# Patient Record
Sex: FEMALE | Race: WHITE | NOT HISPANIC OR LATINO
[De-identification: names, ages, dates, MRNs, and addresses within clinical notes are randomized per-mention and may not be internally consistent; named-entity substitution may affect disease eponyms.]

---

## 2018-12-01 ENCOUNTER — TRANSCRIPTION ENCOUNTER (OUTPATIENT)
Age: 63
End: 2018-12-01

## 2018-12-04 ENCOUNTER — APPOINTMENT (OUTPATIENT)
Dept: INTERNAL MEDICINE | Facility: CLINIC | Age: 63
End: 2018-12-04
Payer: COMMERCIAL

## 2018-12-04 VITALS
RESPIRATION RATE: 12 BRPM | WEIGHT: 148 LBS | DIASTOLIC BLOOD PRESSURE: 85 MMHG | OXYGEN SATURATION: 59 % | SYSTOLIC BLOOD PRESSURE: 133 MMHG | TEMPERATURE: 98.5 F | HEART RATE: 96 BPM | BODY MASS INDEX: 26.22 KG/M2 | HEIGHT: 63 IN

## 2018-12-04 DIAGNOSIS — J32.9 CHRONIC SINUSITIS, UNSPECIFIED: ICD-10-CM

## 2018-12-04 PROCEDURE — 99214 OFFICE O/P EST MOD 30 MIN: CPT

## 2019-01-02 NOTE — HISTORY OF PRESENT ILLNESS
[FreeTextEntry8] : 64 yo female, presents stating that she started having a head cold on Thanksgiving and was taking Dayquil. She stopped the Dayquil on Friday. Saturday the sinus pain was treally bad and went to urgent care. Was diagnosed with sinusitis and was prescribed augmentin 875mg po BID with food. Pt says she has been taking then for the past 2 days but she has not seen any improvement\par Says she still has sinus pressure and pain\par When she wakes up in the morning she feels fine. Later on in the day her sinus start hurting again

## 2019-01-02 NOTE — ASSESSMENT
[FreeTextEntry1] : sinusitis\par d/c augmentin\par start Levaquin 500mg po daily\par nasacort AQ\par medrol dose pack

## 2019-01-02 NOTE — PHYSICAL EXAM
[No Acute Distress] : no acute distress [Well Nourished] : well nourished [Well Developed] : well developed [Normal Sclera/Conjunctiva] : normal sclera/conjunctiva [Normal Outer Ear/Nose] : the outer ears and nose were normal in appearance [Normal Oropharynx] : the oropharynx was normal [Normal TMs] : both tympanic membranes were normal [No JVD] : no jugular venous distention [No Respiratory Distress] : no respiratory distress  [Clear to Auscultation] : lungs were clear to auscultation bilaterally [No Accessory Muscle Use] : no accessory muscle use [Normal Rate] : normal rate  [Regular Rhythm] : with a regular rhythm [Normal S1, S2] : normal S1 and S2 [No Murmur] : no murmur heard [No Edema] : there was no peripheral edema [Soft] : abdomen soft [Non Tender] : non-tender [Non-distended] : non-distended [No CVA Tenderness] : no CVA  tenderness [No Joint Swelling] : no joint swelling [No Rash] : no rash [Normal Gait] : normal gait [Speech Grossly Normal] : speech grossly normal [Normal Insight/Judgement] : insight and judgment were intact [de-identified] : + left maxillary sinus and left ethmoid/pressure pain

## 2019-08-29 ENCOUNTER — APPOINTMENT (OUTPATIENT)
Dept: INTERNAL MEDICINE | Facility: CLINIC | Age: 64
End: 2019-08-29
Payer: COMMERCIAL

## 2019-08-29 VITALS
WEIGHT: 143 LBS | OXYGEN SATURATION: 96 % | RESPIRATION RATE: 12 BRPM | TEMPERATURE: 99 F | DIASTOLIC BLOOD PRESSURE: 82 MMHG | HEIGHT: 63 IN | HEART RATE: 71 BPM | SYSTOLIC BLOOD PRESSURE: 144 MMHG | BODY MASS INDEX: 25.34 KG/M2

## 2019-08-29 DIAGNOSIS — A08.4 VIRAL INTESTINAL INFECTION, UNSPECIFIED: ICD-10-CM

## 2019-08-29 DIAGNOSIS — B34.9 VIRAL INFECTION, UNSPECIFIED: ICD-10-CM

## 2019-08-29 LAB
FLUAV SPEC QL CULT: NEGATIVE
FLUBV AG SPEC QL IA: NEGATIVE

## 2019-08-29 PROCEDURE — 87804 INFLUENZA ASSAY W/OPTIC: CPT | Mod: QW

## 2019-08-29 PROCEDURE — 99213 OFFICE O/P EST LOW 20 MIN: CPT

## 2019-08-29 RX ORDER — LEVOFLOXACIN 500 MG/1
500 TABLET, FILM COATED ORAL DAILY
Qty: 7 | Refills: 0 | Status: DISCONTINUED | COMMUNITY
Start: 2018-12-04 | End: 2019-08-29

## 2019-08-29 RX ORDER — METHYLPREDNISOLONE 4 MG/1
4 TABLET ORAL
Qty: 1 | Refills: 0 | Status: DISCONTINUED | COMMUNITY
Start: 2018-12-04 | End: 2019-08-29

## 2019-08-29 RX ORDER — TRIAMCINOLONE ACETONIDE 55 UG/1
55 SOLUTION/ DROPS OPHTHALMIC DAILY
Qty: 1 | Refills: 3 | Status: DISCONTINUED | COMMUNITY
Start: 2018-12-04 | End: 2019-08-29

## 2019-08-29 NOTE — REVIEW OF SYSTEMS
[Chills] : chills [Fatigue] : fatigue [Night Sweats] : night sweats [Sore Throat] : sore throat [Nausea] : nausea [Vomiting] : vomiting [Negative] : Heme/Lymph

## 2019-08-29 NOTE — HISTORY OF PRESENT ILLNESS
[FreeTextEntry8] : PT PRESENTS WITH 2 DAYS OF BODY ACHE S,CHILLS ,NAUSEA,=VOMITED X1 YESTERDAY NO APPETITE WEAK ,MILD SORE THROAT 2 DAYS AGO ,JUST RETURN FROM [place] 3 DAYS AGO

## 2019-08-29 NOTE — PHYSICAL EXAM
[No Acute Distress] : no acute distress [Well Nourished] : well nourished [Well Developed] : well developed [Well-Appearing] : well-appearing [Normal Sclera/Conjunctiva] : normal sclera/conjunctiva [PERRL] : pupils equal round and reactive to light [EOMI] : extraocular movements intact [Normal Outer Ear/Nose] : the outer ears and nose were normal in appearance [Normal TMs] : both tympanic membranes were normal [Normal Oropharynx] : the oropharynx was normal [Normal Nasal Mucosa] : the nasal mucosa was normal [No JVD] : no jugular venous distention [No Lymphadenopathy] : no lymphadenopathy [Supple] : supple [Thyroid Normal, No Nodules] : the thyroid was normal and there were no nodules present [No Respiratory Distress] : no respiratory distress  [No Accessory Muscle Use] : no accessory muscle use [Clear to Auscultation] : lungs were clear to auscultation bilaterally [Normal Rate] : normal rate  [Regular Rhythm] : with a regular rhythm [Normal S1, S2] : normal S1 and S2 [No Murmur] : no murmur heard [No Carotid Bruits] : no carotid bruits [No Varicosities] : no varicosities [No Abdominal Bruit] : a ~M bruit was not heard ~T in the abdomen [Pedal Pulses Present] : the pedal pulses are present [No Edema] : there was no peripheral edema [No Palpable Aorta] : no palpable aorta [No Extremity Clubbing/Cyanosis] : no extremity clubbing/cyanosis [Soft] : abdomen soft [Non-distended] : non-distended [No Masses] : no abdominal mass palpated [Normal Bowel Sounds] : normal bowel sounds [Normal] : normal [No Mass] : no masses were palpated [Epigastric] : in the epigastric area [No HSM] : no hepatosplenomegaly noted [Normal Posterior Cervical Nodes] : no posterior cervical lymphadenopathy [Normal Anterior Cervical Nodes] : no anterior cervical lymphadenopathy [No CVA Tenderness] : no CVA  tenderness [No Spinal Tenderness] : no spinal tenderness [No Joint Swelling] : no joint swelling [Grossly Normal Strength/Tone] : grossly normal strength/tone [No Rash] : no rash [Coordination Grossly Intact] : coordination grossly intact [No Focal Deficits] : no focal deficits [Normal Gait] : normal gait [Deep Tendon Reflexes (DTR)] : deep tendon reflexes were 2+ and symmetric [Normal Affect] : the affect was normal [Normal Insight/Judgement] : insight and judgment were intact

## 2019-08-29 NOTE — ASSESSMENT
[FreeTextEntry1] : 63 Y OLD FEM PRESENTS WITH VIRAL GASTROENTERITIS SX AND PROFOUND MALAISE WITH CHILLS= POCT INFLUENZA (-)= ZOFRAN 4 MG TIID PRN ,TYLENOL  MG \par INCREASE FLUID INTAKE AND PLAIN STARCHES TILL BETTER

## 2019-09-02 ENCOUNTER — EMERGENCY (EMERGENCY)
Facility: HOSPITAL | Age: 64
LOS: 1 days | Discharge: HOME | End: 2019-09-02
Attending: EMERGENCY MEDICINE
Payer: COMMERCIAL

## 2019-09-02 VITALS
HEART RATE: 50 BPM | WEIGHT: 139.99 LBS | SYSTOLIC BLOOD PRESSURE: 152 MMHG | OXYGEN SATURATION: 98 % | HEIGHT: 63 IN | TEMPERATURE: 98 F | RESPIRATION RATE: 18 BRPM | DIASTOLIC BLOOD PRESSURE: 88 MMHG

## 2019-09-02 VITALS
SYSTOLIC BLOOD PRESSURE: 114 MMHG | TEMPERATURE: 98 F | HEART RATE: 49 BPM | OXYGEN SATURATION: 100 % | RESPIRATION RATE: 16 BRPM | DIASTOLIC BLOOD PRESSURE: 72 MMHG

## 2019-09-02 LAB
ALBUMIN SERPL ELPH-MCNC: 4 G/DL — SIGNIFICANT CHANGE UP (ref 3.3–5)
ALP SERPL-CCNC: 62 U/L — SIGNIFICANT CHANGE UP (ref 40–120)
ALT FLD-CCNC: 25 U/L — SIGNIFICANT CHANGE UP (ref 10–45)
ANION GAP SERPL CALC-SCNC: 12 MMOL/L — SIGNIFICANT CHANGE UP (ref 5–17)
APPEARANCE UR: CLEAR — SIGNIFICANT CHANGE UP
AST SERPL-CCNC: 19 U/L — SIGNIFICANT CHANGE UP (ref 10–40)
BACTERIA # UR AUTO: NEGATIVE — SIGNIFICANT CHANGE UP
BASOPHILS # BLD AUTO: 0.1 K/UL — SIGNIFICANT CHANGE UP (ref 0–0.2)
BASOPHILS NFR BLD AUTO: 1 % — SIGNIFICANT CHANGE UP (ref 0–2)
BILIRUB SERPL-MCNC: 0.1 MG/DL — LOW (ref 0.2–1.2)
BILIRUB UR-MCNC: NEGATIVE — SIGNIFICANT CHANGE UP
BUN SERPL-MCNC: 14 MG/DL — SIGNIFICANT CHANGE UP (ref 7–23)
CALCIUM SERPL-MCNC: 8.9 MG/DL — SIGNIFICANT CHANGE UP (ref 8.4–10.5)
CHLORIDE SERPL-SCNC: 105 MMOL/L — SIGNIFICANT CHANGE UP (ref 96–108)
CO2 SERPL-SCNC: 25 MMOL/L — SIGNIFICANT CHANGE UP (ref 22–31)
COLOR SPEC: YELLOW — SIGNIFICANT CHANGE UP
CREAT SERPL-MCNC: 0.86 MG/DL — SIGNIFICANT CHANGE UP (ref 0.5–1.3)
DIFF PNL FLD: NEGATIVE — SIGNIFICANT CHANGE UP
EOSINOPHIL # BLD AUTO: 0.3 K/UL — SIGNIFICANT CHANGE UP (ref 0–0.5)
EOSINOPHIL NFR BLD AUTO: 2.5 % — SIGNIFICANT CHANGE UP (ref 0–6)
EPI CELLS # UR: 1 /HPF — SIGNIFICANT CHANGE UP
GLUCOSE SERPL-MCNC: 96 MG/DL — SIGNIFICANT CHANGE UP (ref 70–99)
GLUCOSE UR QL: NEGATIVE — SIGNIFICANT CHANGE UP
HCT VFR BLD CALC: 40.5 % — SIGNIFICANT CHANGE UP (ref 34.5–45)
HGB BLD-MCNC: 12.6 G/DL — SIGNIFICANT CHANGE UP (ref 11.5–15.5)
HYALINE CASTS # UR AUTO: 0 /LPF — SIGNIFICANT CHANGE UP (ref 0–2)
KETONES UR-MCNC: NEGATIVE — SIGNIFICANT CHANGE UP
LEUKOCYTE ESTERASE UR-ACNC: NEGATIVE — SIGNIFICANT CHANGE UP
LYMPHOCYTES # BLD AUTO: 2.3 K/UL — SIGNIFICANT CHANGE UP (ref 1–3.3)
LYMPHOCYTES # BLD AUTO: 22.6 % — SIGNIFICANT CHANGE UP (ref 13–44)
MCHC RBC-ENTMCNC: 29.1 PG — SIGNIFICANT CHANGE UP (ref 27–34)
MCHC RBC-ENTMCNC: 31.2 GM/DL — LOW (ref 32–36)
MCV RBC AUTO: 93.3 FL — SIGNIFICANT CHANGE UP (ref 80–100)
MONOCYTES # BLD AUTO: 1 K/UL — HIGH (ref 0–0.9)
MONOCYTES NFR BLD AUTO: 9.6 % — SIGNIFICANT CHANGE UP (ref 2–14)
NEUTROPHILS # BLD AUTO: 6.6 K/UL — SIGNIFICANT CHANGE UP (ref 1.8–7.4)
NEUTROPHILS NFR BLD AUTO: 64.3 % — SIGNIFICANT CHANGE UP (ref 43–77)
NITRITE UR-MCNC: NEGATIVE — SIGNIFICANT CHANGE UP
PH UR: 6 — SIGNIFICANT CHANGE UP (ref 5–8)
PLATELET # BLD AUTO: 546 K/UL — HIGH (ref 150–400)
POTASSIUM SERPL-MCNC: 4.4 MMOL/L — SIGNIFICANT CHANGE UP (ref 3.5–5.3)
POTASSIUM SERPL-SCNC: 4.4 MMOL/L — SIGNIFICANT CHANGE UP (ref 3.5–5.3)
PROT SERPL-MCNC: 6.5 G/DL — SIGNIFICANT CHANGE UP (ref 6–8.3)
PROT UR-MCNC: NEGATIVE — SIGNIFICANT CHANGE UP
RBC # BLD: 4.34 M/UL — SIGNIFICANT CHANGE UP (ref 3.8–5.2)
RBC # FLD: 11.7 % — SIGNIFICANT CHANGE UP (ref 10.3–14.5)
RBC CASTS # UR COMP ASSIST: 2 /HPF — SIGNIFICANT CHANGE UP (ref 0–4)
SODIUM SERPL-SCNC: 142 MMOL/L — SIGNIFICANT CHANGE UP (ref 135–145)
SP GR SPEC: 1.02 — SIGNIFICANT CHANGE UP (ref 1.01–1.02)
UROBILINOGEN FLD QL: NEGATIVE — SIGNIFICANT CHANGE UP
WBC # BLD: 10.2 K/UL — SIGNIFICANT CHANGE UP (ref 3.8–10.5)
WBC # FLD AUTO: 10.2 K/UL — SIGNIFICANT CHANGE UP (ref 3.8–10.5)
WBC UR QL: 1 /HPF — SIGNIFICANT CHANGE UP (ref 0–5)

## 2019-09-02 PROCEDURE — 81001 URINALYSIS AUTO W/SCOPE: CPT

## 2019-09-02 PROCEDURE — 80053 COMPREHEN METABOLIC PANEL: CPT

## 2019-09-02 PROCEDURE — 96374 THER/PROPH/DIAG INJ IV PUSH: CPT

## 2019-09-02 PROCEDURE — 96375 TX/PRO/DX INJ NEW DRUG ADDON: CPT

## 2019-09-02 PROCEDURE — 70450 CT HEAD/BRAIN W/O DYE: CPT

## 2019-09-02 PROCEDURE — 99284 EMERGENCY DEPT VISIT MOD MDM: CPT

## 2019-09-02 PROCEDURE — 85027 COMPLETE CBC AUTOMATED: CPT

## 2019-09-02 PROCEDURE — 70450 CT HEAD/BRAIN W/O DYE: CPT | Mod: 26

## 2019-09-02 PROCEDURE — 99284 EMERGENCY DEPT VISIT MOD MDM: CPT | Mod: 25

## 2019-09-02 RX ORDER — KETOROLAC TROMETHAMINE 30 MG/ML
15 SYRINGE (ML) INJECTION ONCE
Refills: 0 | Status: DISCONTINUED | OUTPATIENT
Start: 2019-09-02 | End: 2019-09-02

## 2019-09-02 RX ORDER — METOCLOPRAMIDE HCL 10 MG
10 TABLET ORAL ONCE
Refills: 0 | Status: COMPLETED | OUTPATIENT
Start: 2019-09-02 | End: 2019-09-02

## 2019-09-02 RX ORDER — SODIUM CHLORIDE 9 MG/ML
1000 INJECTION INTRAMUSCULAR; INTRAVENOUS; SUBCUTANEOUS ONCE
Refills: 0 | Status: COMPLETED | OUTPATIENT
Start: 2019-09-02 | End: 2019-09-02

## 2019-09-02 RX ADMIN — Medication 10 MILLIGRAM(S): at 14:52

## 2019-09-02 RX ADMIN — SODIUM CHLORIDE 1000 MILLILITER(S): 9 INJECTION INTRAMUSCULAR; INTRAVENOUS; SUBCUTANEOUS at 14:52

## 2019-09-02 RX ADMIN — Medication 15 MILLIGRAM(S): at 14:52

## 2019-09-02 NOTE — ED PROVIDER NOTE - PATIENT PORTAL LINK FT
You can access the FollowMyHealth Patient Portal offered by [facility] by registering at the following website: [email or web address]. By joining [employer]’s FollowMyHealth portal, you will also be able to view your health information using other applications (apps) compatible with our system.

## 2019-09-02 NOTE — ED PROVIDER NOTE - NSFOLLOWUPINSTRUCTIONS_ED_ALL_ED_FT
Return to Emergency Department if new or worsening symptoms present.  Follow up with your primary care physician within 3 days.    WHAT YOU NEED TO KNOW:    An acute headache is pain or discomfort that starts suddenly and gets worse quickly. You may have an acute headache only when you feel stress or eat certain foods. Other acute headache pain can happen every day, and sometimes several times a day.     DISCHARGE INSTRUCTIONS:    Seek care immediately if:     You have severe pain.      You have numbness or weakness on one side of your face or body.      You have a headache that occurs after a blow to the head, a fall, or other trauma.       You have a headache, are forgetful or confused, or have trouble speaking.      You have a headache, stiff neck, and a fever.    Contact your healthcare provider if:     You have a constant headache and are vomiting.      You have a headache each day that does not get better, even after treatment.      You have changes in your headaches, or new symptoms that occur when you have a headache.      You have questions or concerns about your condition or care.    Medicines: You may need any of the following:     Prescription pain medicine may be given. The medicine your healthcare provider recommends will depend on the kind of headaches you have. You will need to take prescription headache medicines as directed to prevent a problem called rebound headache. These headaches happen with regular use of pain relievers for headache disorders.      NSAIDs, such as ibuprofen, help decrease swelling, pain, and fever. This medicine is available with or without a doctor's order. NSAIDs can cause stomach bleeding or kidney problems in certain people. If you take blood thinner medicine, always ask your healthcare provider if NSAIDs are safe for you. Always read the medicine label and follow directions.      Acetaminophen decreases pain and fever. It is available without a doctor's order. Ask how much to take and how often to take it. Follow directions. Read the labels of all other medicines you are using to see if they also contain acetaminophen, or ask your doctor or pharmacist. Acetaminophen can cause liver damage if not taken correctly. Do not use more than 3 grams (3,000 milligrams) total of acetaminophen in one day.       Antidepressants may be given for some kinds of headaches.       Take your medicine as directed. Contact your healthcare provider if you think your medicine is not helping or if you have side effects. Tell him or her if you are allergic to any medicine. Keep a list of the medicines, vitamins, and herbs you take. Include the amounts, and when and why you take them. Bring the list or the pill bottles to follow-up visits. Carry your medicine list with you in case of an emergency.    Manage your symptoms:     Apply heat or ice on the headache area. Use a heat or ice pack. For an ice pack, you can also put crushed ice in a plastic bag. Cover the pack or bag with a towel before you apply it to your skin. Ice and heat both help decrease pain, and heat also helps decrease muscle spasms. Apply heat for 20 to 30 minutes every 2 hours. Apply ice for 15 to 20 minutes every hour. Apply heat or ice for as long and for as many days as directed. You may alternate heat and ice.      Relax your muscles. Lie down in a comfortable position and close your eyes. Relax your muscles slowly. Start at your toes and work your way up your body.      Keep a record of your headaches. Write down when your headaches start and stop. Include your symptoms and what you were doing when the headache began. Record what you ate or drank for 24 hours before the headache started. Describe the pain and where it hurts. Keep track of what you did to treat your headache and if it worked.     Prevent an acute headache:     Avoid anything that triggers an acute headache. Examples include exposure to chemicals, going to high altitude, or not getting enough sleep. Create a regular sleep routine. Go to sleep at the same time and wake up at the same time each day. Do not use electronic devices before bedtime. These may trigger a headache or prevent you from sleeping well.      Do not smoke. Nicotine and other chemicals in cigarettes and cigars can trigger an acute headache or make it worse. Ask your healthcare provider for information if you currently smoke and need help to quit. E-cigarettes or smokeless tobacco still contain nicotine. Talk to your healthcare provider before you use these products.       Limit alcohol as directed. Alcohol can trigger an acute headache or make it worse. If you have cluster headaches, do not drink alcohol during an episode. For other types of headaches, ask your healthcare provider if it is safe for you to drink alcohol. Ask how much is safe for you to drink, and how often.      Exercise as directed. Exercise can reduce tension and help with headache pain. Aim for 30 minutes of physical activity on most days of the week. Your healthcare provider can help you create an exercise plan.      Eat a variety of healthy foods. Healthy foods include fruits, vegetables, low-fat dairy products, lean meats, fish, whole grains, and cooked beans. Your healthcare provider or dietitian can help you create meals plans if you need to avoid foods that trigger headaches.    Follow up with your healthcare provider as directed: Bring your headache record with you when you see your healthcare provider. Write down your questions so you remember to ask them during your visits.

## 2019-09-02 NOTE — ED ADULT NURSE REASSESSMENT NOTE - NS ED NURSE REASSESS COMMENT FT1
Pt states she feels better after fluids and meds. NAD noted. Pending CT read. Plan of care discussed. Will continue to monitor.

## 2019-09-02 NOTE — ED ADULT NURSE NOTE - OBJECTIVE STATEMENT
63F pt AxOx3 ambulatory to ED accompanied by spouse c/o intermittent HAs x1 wk. Pt states recent travel to [place], returned on 8/27 and states she had stomach virus first 2 days upon return, which has now improved. Pt denies fever/chills. Pt denies vision changes/disturbances. Pt states she is tolerating PO intake well, but becomes nauseous when ROSADO returns. Pt states relief w/ Extra Strength Tylenol. Pt states pain is 2/10 at this time and took 1gm Tylenol @ 1100 this am. Pt denies dizziness/photophobia. Pt denies slurred speech/CP/SOB/diarrhea. On assessment, resp even, unlabored. Gross Neuro intact. Sensory intact. MAEx4. No facial droop/tongue deviation noted. PERRLA. VSS. Afebrile. Pt undressed and changed into gown. NAD noted. Bed locked in lowest position. Spouse at bedside. Will continue to monitor.

## 2019-09-02 NOTE — ED PROVIDER NOTE - ATTENDING CONTRIBUTION TO CARE
****ATTENDING**** 62yo f presents with HA x 4 d. states she recently returned from vacation and was at that time, now w mild congestion, vomiting and bodyaches. Pt seen by PCP and told she has a stomach virus. Now pt co persistent HA 10/10. No change in vision. No neck pain, photophobia, f/c.   On exam, Patient is awake,alert,oriented x 3. Patient is well appearing and in no acute distress. Non tender sinus. Patient's chest is clear to ausculation, +s1s2. Abdomen is soft nd/nt +BS. Extremity with no swelling or calf tenderness.   Patient non toxic.   Check labs, CT head, pain control.

## 2019-09-02 NOTE — ED PROVIDER NOTE - OBJECTIVE STATEMENT
64yo F p/w headache x 4days. Pt returned from [facility] 8/27 had bodyache and vomiting. Went to PCP, neg for flu, told she has stomach virus. Started on 4mg zofran. Since that time she has had daily headaches 10/10 at worst, 5/10 with tylenol. Headache is worse with movement denies vision changes, focal neuro changes,  photophobia, phonophobia. 62yo F p/w headache x 4days. Pt returned from [facility] 8/27 had bodyache and vomiting. Went to PCP, neg for flu, told she has stomach virus. Started on 4mg zofran. Since that time she has had daily headaches 10/10 at worst, 5/10 with tylenol. Headache is worse with movement, fahad bending forward. Describes it as "rocks in my head"  denies vision changes, focal neuro changes,  photophobia, phonophobia.

## 2019-09-02 NOTE — ED ADULT NURSE NOTE - CHIEF COMPLAINT QUOTE
weakness and headache  told by PMD has stomach virus  return from [facility] Tuesday

## 2019-09-23 ENCOUNTER — APPOINTMENT (OUTPATIENT)
Dept: NEUROSURGERY | Facility: CLINIC | Age: 64
End: 2019-09-23
Payer: COMMERCIAL

## 2019-09-23 PROCEDURE — 99203 OFFICE O/P NEW LOW 30 MIN: CPT

## 2019-09-26 ENCOUNTER — APPOINTMENT (OUTPATIENT)
Dept: NEUROSURGERY | Facility: CLINIC | Age: 64
End: 2019-09-26
Payer: COMMERCIAL

## 2019-09-26 VITALS
HEART RATE: 43 BPM | WEIGHT: 145 LBS | HEIGHT: 63 IN | BODY MASS INDEX: 25.69 KG/M2 | SYSTOLIC BLOOD PRESSURE: 156 MMHG | OXYGEN SATURATION: 97 % | DIASTOLIC BLOOD PRESSURE: 74 MMHG | RESPIRATION RATE: 14 BRPM

## 2019-09-26 DIAGNOSIS — Z87.09 PERSONAL HISTORY OF OTHER DISEASES OF THE RESPIRATORY SYSTEM: ICD-10-CM

## 2019-09-26 DIAGNOSIS — Z85.9 PERSONAL HISTORY OF MALIGNANT NEOPLASM, UNSPECIFIED: ICD-10-CM

## 2019-09-26 DIAGNOSIS — Z82.49 FAMILY HISTORY OF ISCHEMIC HEART DISEASE AND OTHER DISEASES OF THE CIRCULATORY SYSTEM: ICD-10-CM

## 2019-09-26 PROCEDURE — 99244 OFF/OP CNSLTJ NEW/EST MOD 40: CPT

## 2019-09-30 NOTE — REVIEW OF SYSTEMS
[Feeling Poorly] : feeling poorly [Tension Headache] : tension-type headaches [Migraine Headache] : migraine headaches [Negative] : Heme/Lymph [Confused or Disoriented] : no confusion [Difficulty with Language] : no ~M difficulty with language [Facial Weakness] : no facial weakness [Hand Weakness] : no hand weakness [Arm Weakness] : no arm weakness [Seizures] : no convulsions [Leg Weakness] : no leg weakness [Lightheadedness] : no lightheadedness [Dizziness] : no dizziness

## 2019-09-30 NOTE — RESULTS/DATA
[FreeTextEntry1] : EXAM: CT BRAIN \par \par PROCEDURE DATE: 09/02/2019 \par \par \par INTERPRETATION: EXAM: CT HEAD WITHOUT INTRAVENOUS CONTRAST \par \par CLINICAL INFORMATION: Headache for 6 days. \par \par TECHNIQUE: Noncontrast axial CT images were acquired of the head. \par Two-dimensional sagittal and coronal reformats were generated. \par \par COMPARISON STUDY: No similar prior comparisons available. \par \par FINDINGS: \par There is no evidence of an acute intracranial hemorrhage, extra-axial \par collection, mass effect, or midline shift. The basal cisterns are patent. No \par evidence of downward herniation. No hydrocephalus. \par \par The visualized paranasal sinuses and mastoid air cells are clear. \par \par The globes are symmetric in size and contour. \par \par No displaced calvarial fracture. \par \par IMPRESSION: \par No evidence of acute intracranial hemorrhage, midline shift, or \par hydrocephalus. \par \par \par \par \par \par EDY KIMBROUGH M.D., RADIOLOGY RESIDENT \par This document has been electronically signed. \par TOPHER LONDON M.D., ATTENDING RADIOLOGIST \par This document has been electronically signed. Sep 2 2019 4:55PM \par \par \par \par \par

## 2019-09-30 NOTE — PHYSICAL EXAM
[General Appearance - Alert] : alert [General Appearance - In No Acute Distress] : in no acute distress [Oriented To Time, Place, And Person] : oriented to person, place, and time [Impaired Insight] : insight and judgment were intact [Affect] : the affect was normal [I: Normal Smell] : smell intact bilaterally [Cranial Nerves Optic (II)] : visual acuity intact bilaterally,  pupils equal round and reactive to light [Cranial Nerves Oculomotor (III)] : extraocular motion intact [Cranial Nerves Trigeminal (V)] : facial sensation intact symmetrically [Cranial Nerves Facial (VII)] : face symmetrical [Cranial Nerves Vestibulocochlear (VIII)] : hearing was intact bilaterally [Cranial Nerves Glossopharyngeal (IX)] : tongue and palate midline [Cranial Nerves Accessory (XI - Cranial And Spinal)] : head turning and shoulder shrug symmetric [Cranial Nerves Hypoglossal (XII)] : there was no tongue deviation with protrusion [Motor Tone] : muscle tone was normal in all four extremities [Motor Strength] : muscle strength was normal in all four extremities [Involuntary Movements] : no involuntary movements were seen [Sensation Tactile Decrease] : light touch was intact [2+] : Patella left 2+ [Sclera] : the sclera and conjunctiva were normal [Outer Ear] : the ears and nose were normal in appearance [Neck Appearance] : the appearance of the neck was normal [Exaggerated Use Of Accessory Muscles For Inspiration] : no accessory muscle use [] : no respiratory distress [Abdomen Soft] : soft [Heart Rate And Rhythm] : heart rate was normal and rhythm regular [Abnormal Walk] : normal gait [Skin Color & Pigmentation] : normal skin color and pigmentation [FreeTextEntry8] : Mild Abnormality with Heel to toe walking

## 2019-09-30 NOTE — ASSESSMENT
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 Oro For Gamma Knife Evaluation\par Education provided regarding plan of care.\par

## 2019-09-30 NOTE — DATA REVIEWED
[de-identified] : MRI BRAIN 9/11/19:  SHARPLY CIRCUMSCRIBED SOFT TISSUE MASS FILLING AND EXPANDING LEFT CAVERNOUS SINUS WITH MEDIAL ANSION INTO THE SELLA POSTERIOR INTO THE LEFT LATERAL PREPONTINE CISTERN AND LATERAL EXTENSION IN THE PARASELLAR CONSISTENT WITH MENINGIOMA.

## 2019-09-30 NOTE — ADDENDUM
[FreeTextEntry1] : September 26, 2019\par \par Josh Luevano MD\par [facility]\par [address] [place] [address]\[place] [address]\par \par Re:	Jessica Michelle \par \par Dear Josh:\par \par I saw Ms. Michelle in the office today.  As you know, she is a healthy 63-year-old female on Synthroid who presents with significant headache since August that is right sided and is nearly disabling without anti-inflammatories.  She reports this came on suddenly after returning from [facility], where she is from, and this has been a problem for her.  She ultimately underwent an MRI as well as MRI with contrast, which showed evidence of a sizable intracavernous apparent meningioma, and she now comes for neurosurgical evaluation.\par \par Ms. Michelle does have a history of thyroid cancer removed in 2016 along with posttreatment radiation.  She additionally has a history of asthma.  Her current medications include Synthroid and Motrin.  She does not drink but does continue to smoke approximately half a pack per day.  She is  with 3 adult children and 2 grandchildren who live with her.  She works as a .  Other than her thyroid surgery, she has had no other major surgery.  Neurological review of systems is notable for the absence of any left-sided numbness or tingling or facial pain.  She additionally denies any double vision or retro-orbital headache on the left.\par \par Neurologically, Ms. Michelle is grossly intact.  Her cranial nerve testing is normal.  There is no evidence of facial numbness.\par \par I did have the opportunity to review her MRI and MR with contrast, which does show a sizable intracavernous meningioma extending into the mesial temporal area as well as into Meckel’s cave and to the posterior fossa.  The tumor appears to narrow the left intracavernous carotid and abut the left temporal lobe.  The 5th nerve appears to be involved with tumor as well.\par \par I had a long discussion with Jessica.  I certainly cannot correlate her tumor with her headaches given the left-sided tumor with the right-sided headache, but it is possible that it is somehow contributing to her headache symptoms.  I encouraged her to continue to take anti-inflammatories for treating her current symptoms.  As far as the tumor goes, she really has no gross symptoms.  She obviously has no evidence of cranial neuropathy or facial pain, and I do not think the tumor in its current state is causing any gross neurological symptoms.  I do, however, think we should follow this closely.  Certainly, if this begins to grow, this likely ought to be treated with stereotactic radiation because I do not think this is optimal for surgery given its location.  I told her so.  For the time being, I would like her to obtain a NOVA test to check her MRA blood vessel flows and ensure that the carotid narrowing is not causing any decrease in left middle cerebral flow.  Additionally, I would like to recheck her MRI in 3 months to assess the tumor to make sure there is not any interval enlargement that would necessitate more early treatment.  Lastly, I am going to have her meet with our Gamma Knife specialist here at [facility], Harjinder Oro, who will make a recommendation as to whether we ought to recommend radiosurgical treatment for this tumor.\par \par Additionally, we will be presenting her in our tumor board here next week, and once we get the tumor board recommendation, I will be in touch with Jessica regarding our plan of care.\par \par Thank you so much for allowing me to contribute. \par \par Sincerely,\par \par \par \par Daquan Tejeda MD\par \par DL/ag [facility] #[id number]\par \par CC:	Anastacio Oleary MD\par 	[address]\par 	[address]\[place] 	[place] [address]\par

## 2019-09-30 NOTE — HISTORY OF PRESENT ILLNESS
[FreeTextEntry1] : Right sided Frontal Headaches with Right Sided Radiation [de-identified] : This is a 63 year old woman here for Neurosurgical evaluation of  LEFT CAVERNOUS SINUS MENINGIOMA. She reports that she started to experience RIGHT FRONTAL HEADACHE PAIN with facial radiation. She denies any double vision or visual disturbances. In addition to any Motor weakness. She reports that she is over 40 Years smoking history and continues to smoke 1/2 PPD. She desires to quit. She denies any gross neurological deficits.

## 2019-10-01 ENCOUNTER — APPOINTMENT (OUTPATIENT)
Dept: NEUROSURGERY | Facility: CLINIC | Age: 64
End: 2019-10-01
Payer: COMMERCIAL

## 2019-10-01 ENCOUNTER — APPOINTMENT (OUTPATIENT)
Dept: NEUROLOGY | Facility: CLINIC | Age: 64
End: 2019-10-01
Payer: COMMERCIAL

## 2019-10-01 ENCOUNTER — LABORATORY RESULT (OUTPATIENT)
Age: 64
End: 2019-10-01

## 2019-10-01 VITALS
HEART RATE: 45 BPM | OXYGEN SATURATION: 98 % | SYSTOLIC BLOOD PRESSURE: 139 MMHG | BODY MASS INDEX: 25.34 KG/M2 | WEIGHT: 143 LBS | DIASTOLIC BLOOD PRESSURE: 80 MMHG | TEMPERATURE: 98.4 F | HEIGHT: 63 IN

## 2019-10-01 VITALS
HEART RATE: 46 BPM | HEIGHT: 63 IN | OXYGEN SATURATION: 96 % | RESPIRATION RATE: 14 BRPM | BODY MASS INDEX: 25.69 KG/M2 | DIASTOLIC BLOOD PRESSURE: 79 MMHG | WEIGHT: 145 LBS | SYSTOLIC BLOOD PRESSURE: 148 MMHG

## 2019-10-01 DIAGNOSIS — R51 HEADACHE: ICD-10-CM

## 2019-10-01 PROCEDURE — 99214 OFFICE O/P EST MOD 30 MIN: CPT

## 2019-10-01 PROCEDURE — 99245 OFF/OP CONSLTJ NEW/EST HI 55: CPT

## 2019-10-01 NOTE — PHYSICAL EXAM
[Outer Ear] : the ears and nose were normal in appearance [Neck Appearance] : the appearance of the neck was normal [Neck Cervical Mass (___cm)] : no neck mass was observed [] : no respiratory distress [Respiration, Rhythm And Depth] : normal respiratory rhythm and effort [Auscultation Breath Sounds / Voice Sounds] : lungs were clear to auscultation bilaterally [Heart Rate And Rhythm] : heart rate was normal and rhythm regular [Heart Sounds] : normal S1 and S2 [Arterial Pulses Carotid] : carotid pulses were normal with no bruits [No CVA Tenderness] : no ~M costovertebral angle tenderness [No Spinal Tenderness] : no spinal tenderness [Skin Color & Pigmentation] : normal skin color and pigmentation [Skin Turgor] : normal skin turgor [Skin Lesions] : no skin lesions [FreeTextEntry1] : Constitutional: alert, in no acute distress, well nourished and well developed.\par Psychiatric:  insight and judgment were intact.\par \par Neurologic: \par Mental Status: The patient is alert, attentive, and oriented to person, place, and time. Speech is clear and fluent with good repetition, comprehension, and naming. \par Cranial nerves:\par CN II: Visual fields are full to confrontation. Fundoscopic exam is normal with sharp discs and no vascular changes.Visual acuity is intact. \par CN III, IV, VI: EOMI, PERRLA\par CN V: Facial sensation is intact to pinprick in all 3 divisions bilaterally. Corneal responses are intact.\par CN VII: Face is symmetric with normal eye closure and smile.\par CN VIII: Hearing is normal to rubbing fingers\par CN IX, X: Palate elevates symmetrically. Phonation is normal.\par CN XI: Head turning and shoulder shrug are intact and symmetric.\par CN XII: Tongue is midline with normal movements and no atrophy and no deviation with protrusion.\par Motor: There is no pronator drift of out-stretched arms. Muscle bulk and tone is normal. Strength is full bilaterally 5/5 on both UE and both LE. \par Sensation: light touch is intact; pinprick intact; vibration b/l intact.\par Reflexes:Reflexes are 2+ and symmetric at the biceps, triceps, knees, and ankles.\par Coordination: Rapid alternating movements and fine finger movements are intact. There is no dysmetria on finger-to-nose and heel-knee-shin. There are no abnormal or extraneous movements. Negative Romberg. \par Gait/Stance: Posture is normal. Gait is steady with normal steps, base, arm swing, and turning. Heel and toe walking are normal. \par \par \par \par

## 2019-10-01 NOTE — ASSESSMENT
[FreeTextEntry1] : 64 y/o left handed F patient w/pmh of thyroidectomy ( 2006) and finger surgeries (difficulty closing them) about 5 years ago presents for consultation for headaches and large acute sagittal sinus thrombosis as referred by Dr. Tejeda. MRI of brain reviewed in visit today. \par \par Plan\par -F/u with Dr. Tejeda with MRV brain today\par -Lovenox 1.5 mg/kg daily starting today after MRV and hypercoag labs done\par -hypercoag labs ordered\par -referral to see Dr. Martin

## 2019-10-01 NOTE — PHYSICAL EXAM
[General Appearance - Alert] : alert [General Appearance - In No Acute Distress] : in no acute distress [Oriented To Time, Place, And Person] : oriented to person, place, and time [Affect] : the affect was normal [I: Normal Smell] : smell intact bilaterally [Cranial Nerves Optic (II)] : visual acuity intact bilaterally,  pupils equal round and reactive to light [Cranial Nerves Oculomotor (III)] : extraocular motion intact [Cranial Nerves Trigeminal (V)] : facial sensation intact symmetrically [Cranial Nerves Facial (VII)] : face symmetrical [Cranial Nerves Vestibulocochlear (VIII)] : hearing was intact bilaterally [Cranial Nerves Glossopharyngeal (IX)] : tongue and palate midline [Cranial Nerves Accessory (XI - Cranial And Spinal)] : head turning and shoulder shrug symmetric [Cranial Nerves Hypoglossal (XII)] : there was no tongue deviation with protrusion [Motor Tone] : muscle tone was normal in all four extremities [Motor Strength] : muscle strength was normal in all four extremities [Sensation Tactile Decrease] : light touch was intact [Balance] : balance was intact [Sclera] : the sclera and conjunctiva were normal [Outer Ear] : the ears and nose were normal in appearance [Neck Appearance] : the appearance of the neck was normal [] : no respiratory distress [Heart Rate And Rhythm] : heart rate was normal and rhythm regular [Abdomen Soft] : soft [Abnormal Walk] : normal gait [Skin Color & Pigmentation] : normal skin color and pigmentation

## 2019-10-01 NOTE — CONSULT LETTER
[Consult Letter:] : I had the pleasure of evaluating your patient, [unfilled]. [Dear  ___] : Dear  [unfilled], [Please see my note below.] : Please see my note below. [Consult Closing:] : Thank you very much for allowing me to participate in the care of this patient.  If you have any questions, please do not hesitate to contact me. [FreeTextEntry3] : Shira Felder MD\par Board Certified Neurology and Vascular Neurology\par Professor of Neurology\par [facility]\par [facility]\par [facility]\par \par [email or web address]\par [phone]\par  [Sincerely,] : Sincerely,

## 2019-10-01 NOTE — HISTORY OF PRESENT ILLNESS
[FreeTextEntry1] : 64 y/o left handed F patient w/pmh of thyroidectomy ( 2006) and finger surgeries (difficulty closing them) about 5 years ago presents for consultation for headaches and large acute sagittal sinus thrombosis as referred by Dr. Tejeda. \par \par Patient had gone to [facility] and came back August 27th and had headaches severe- 10/10 pain. Had gone to the doctor and was told she had the stomach flu due vomiting August 29th. Patient even had gone to the ER too 9/2- went to [facility]; CT was done- normal. Patient states this is the first week where she doesn't have the headaches- in the beginning went to see an ENT who gave her flonase but didn't have sinusitis. She was taking Tylenol and didn't work. She tried Motrin which helped but had to take it every 4 hours. Patient was given gabapentin 300mg which was given by her neurologist. She said it helps her sleep but doesn't take it all the time and would only take one. \par \par When patient would have the headaches, she would have right eye tearing. Patient also thought the dentist may have thought the headaches were related to teeth- but nothing was shown on the CT scan. Dentist gave her clindamycin - he says she may have sinusitis. \par \par Patient saw Dr. Tejeda on the 26th of September- did MRI in [place] prior. She knew from her past of neurologist she had a meningoma but wanted a consultation. \par \par No family history of migraines and no personal history of migraines- however 10 years ago, she had severe headaches. She realized though she was drinking a lot of water with aspartame on a daily excessively- she did her own research and stopped and the headaches immediately resolved. \par \par Patient had miscarriage- first trimester in the past. Patient never had clots in legs.

## 2019-10-02 NOTE — ADDENDUM
[FreeTextEntry1] : September 26, 2019\par \par Josh Luevano MD\par [facility]\par [address] [place] [address]\[place] [address]\par \par Re:	Jessica Michelle \par \par Dear Josh:\par \par I saw Ms. Michelle in the office today.  As you know, she is a healthy 63-year-old female on Synthroid who presents with significant headache since August that is right sided and is nearly disabling without anti-inflammatories.  She reports this came on suddenly after returning from [facility], where she is from, and this has been a problem for her.  She ultimately underwent an MRI as well as MRI with contrast, which showed evidence of a sizable intracavernous apparent meningioma, and she now comes for neurosurgical evaluation.\par \par Ms. Michelle does have a history of thyroid cancer removed in 2016 along with posttreatment radiation.  She additionally has a history of asthma.  Her current medications include Synthroid and Motrin.  She does not drink but does continue to smoke approximately half a pack per day.  She is  with 3 adult children and 2 grandchildren who live with her.  She works as a .  Other than her thyroid surgery, she has had no other major surgery.  Neurological review of systems is notable for the absence of any left-sided numbness or tingling or facial pain.  She additionally denies any double vision or retro-orbital headache on the left.\par \par Neurologically, Ms. Michelle is grossly intact.  Her cranial nerve testing is normal.  There is no evidence of facial numbness.\par \par I did have the opportunity to review her MRI and MR with contrast, which does show a sizable intracavernous meningioma extending into the mesial temporal area as well as into Meckel’s cave and to the posterior fossa.  The tumor appears to narrow the left intracavernous carotid and abut the left temporal lobe.  The 5th nerve appears to be involved with tumor as well.\par \par I had a long discussion with Jessica.  I certainly cannot correlate her tumor with her headaches given the left-sided tumor with the right-sided headache, but it is possible that it is somehow contributing to her headache symptoms.  I encouraged her to continue to take anti-inflammatories for treating her current symptoms.  As far as the tumor goes, she really has no gross symptoms.  She obviously has no evidence of cranial neuropathy or facial pain, and I do not think the tumor in its current state is causing any gross neurological symptoms.  I do, however, think we should follow this closely.  Certainly, if this begins to grow, this likely ought to be treated with stereotactic radiation because I do not think this is optimal for surgery given its location.  I told her so.  For the time being, I would like her to obtain a NOVA test to check her MRA blood vessel flows and ensure that the carotid narrowing is not causing any decrease in left middle cerebral flow.  Additionally, I would like to recheck her MRI in 3 months to assess the tumor to make sure there is not any interval enlargement that would necessitate more early treatment.  Lastly, I am going to have her meet with our Gamma Knife specialist here at [facility], Harjinder Oro, who will make a recommendation as to whether we ought to recommend radiosurgical treatment for this tumor.\par \par Additionally, we will be presenting her in our tumor board here next week, and once we get the tumor board recommendation, I will be in touch with Jessica regarding our plan of care.\par \par Thank you so much for allowing me to contribute. \par \par Sincerely,\par \par \par \par Daquan Tejeda MD\par \par DL/ag DocuMed #[id number]\par \par CC:	Anastacio Oleary MD\par 	[address] [facility] [facility]\par 	[address]\[place] 	[place] [address]\par

## 2019-10-02 NOTE — ASSESSMENT
[FreeTextEntry1] : See Dr. Felder, Stroke Neurologist for evaluation\par NEED MRA/MRV NOVA protocol\par Education provided regarding plan of care.\par \par

## 2019-10-02 NOTE — REVIEW OF SYSTEMS
[Migraine Headache] : migraine headaches [Negative] : Heme/Lymph [Repeating Questions] : no repeated questioning about recent events

## 2019-10-02 NOTE — REASON FOR VISIT
[Follow-Up: _____] : a [unfilled] follow-up visit [Family Member] : family member [FreeTextEntry1] : Here for follow up evaluation of LARGE ACUTE SAGITTAL SINUS THROMBOSIS\par She denies any neurological symptoms. She reports that she went to her dentist and he recommend antibiotics. After taking the antibiotics, she now reports no headaches. She continues to smoke. Smoking cessation strongly recommended.\par

## 2019-10-07 ENCOUNTER — RX RENEWAL (OUTPATIENT)
Age: 64
End: 2019-10-07

## 2019-10-07 ENCOUNTER — APPOINTMENT (OUTPATIENT)
Dept: NEUROSURGERY | Facility: CLINIC | Age: 64
End: 2019-10-07
Payer: COMMERCIAL

## 2019-10-07 VITALS
HEART RATE: 47 BPM | RESPIRATION RATE: 14 BRPM | OXYGEN SATURATION: 97 % | HEIGHT: 63 IN | SYSTOLIC BLOOD PRESSURE: 156 MMHG | WEIGHT: 143 LBS | BODY MASS INDEX: 25.34 KG/M2 | DIASTOLIC BLOOD PRESSURE: 72 MMHG

## 2019-10-07 LAB
AT III PPP CHRO-ACNC: 111 %
B2 GLYCOPROT1 AB SER QL: NEGATIVE
B2 GLYCOPROT1 IGA SERPL IA-ACNC: <5 SAU
B2 GLYCOPROT1 IGG SER-ACNC: <5 SGU
B2 GLYCOPROT1 IGM SER-ACNC: <5 SMU
CARDIOLIPIN IGM SER-MCNC: 9.8 MPL
CARDIOLIPIN IGM SER-MCNC: <5 GPL
CONFIRM: 27.1 SEC
DNA PLOIDY SPEC FC-IMP: NORMAL
DRVVT IMM 1:2 NP PPP: NORMAL
DRVVT SCREEN TO CONFIRM RATIO: 0.94 RATIO
HCYS SERPL-MCNC: 11.2 UMOL/L
METHYLMALONATE SERPL-SCNC: 228 NMOL/L
PROT C PPP CHRO-ACNC: 111 %
PROT S AG ACT/NOR PPP IA: 90 %
PROT S FREE PPP-ACNC: 99 % NORMAL
SCREEN DRVVT: 30.8 SEC
VIT B12 SERPL-MCNC: 850 PG/ML

## 2019-10-07 PROCEDURE — 99215 OFFICE O/P EST HI 40 MIN: CPT

## 2019-10-07 NOTE — REVIEW OF SYSTEMS
[As Noted in HPI] : as noted in HPI [Negative] : Heme/Lymph [Migraine Headache] : migraine headaches

## 2019-10-07 NOTE — PHYSICAL EXAM
[General Appearance - Alert] : alert [General Appearance - In No Acute Distress] : in no acute distress [Oriented To Time, Place, And Person] : oriented to person, place, and time [Cranial Nerves Optic (II)] : visual acuity intact bilaterally,  pupils equal round and reactive to light [Cranial Nerves Oculomotor (III)] : extraocular motion intact [Cranial Nerves Trigeminal (V)] : facial sensation intact symmetrically [Cranial Nerves Facial (VII)] : face symmetrical [Cranial Nerves Vestibulocochlear (VIII)] : hearing was intact bilaterally [Cranial Nerves Glossopharyngeal (IX)] : tongue and palate midline [Cranial Nerves Accessory (XI - Cranial And Spinal)] : head turning and shoulder shrug symmetric [Cranial Nerves Hypoglossal (XII)] : there was no tongue deviation with protrusion [Motor Tone] : muscle tone was normal in all four extremities [Motor Strength] : muscle strength was normal in all four extremities [Sclera] : the sclera and conjunctiva were normal [Outer Ear] : the ears and nose were normal in appearance [Neck Appearance] : the appearance of the neck was normal [] : no respiratory distress [Respiration, Rhythm And Depth] : normal respiratory rhythm and effort [Heart Rate And Rhythm] : heart rate was normal and rhythm regular [Abdomen Soft] : soft [Abnormal Walk] : normal gait [Skin Color & Pigmentation] : normal skin color and pigmentation

## 2019-10-08 VITALS
TEMPERATURE: 98 F | WEIGHT: 143.08 LBS | HEIGHT: 63 IN | RESPIRATION RATE: 18 BRPM | SYSTOLIC BLOOD PRESSURE: 133 MMHG | OXYGEN SATURATION: 97 % | DIASTOLIC BLOOD PRESSURE: 74 MMHG | HEART RATE: 52 BPM

## 2019-10-08 LAB
APTT BLD: 39.4 SEC — HIGH (ref 27.5–36.3)
BASOPHILS # BLD AUTO: 0.08 K/UL — SIGNIFICANT CHANGE UP (ref 0–0.2)
BASOPHILS NFR BLD AUTO: 0.7 % — SIGNIFICANT CHANGE UP (ref 0–2)
EOSINOPHIL # BLD AUTO: 0.19 K/UL — SIGNIFICANT CHANGE UP (ref 0–0.5)
EOSINOPHIL NFR BLD AUTO: 1.7 % — SIGNIFICANT CHANGE UP (ref 0–6)
HCT VFR BLD CALC: 44.7 % — SIGNIFICANT CHANGE UP (ref 34.5–45)
HGB BLD-MCNC: 13.6 G/DL — SIGNIFICANT CHANGE UP (ref 11.5–15.5)
IMM GRANULOCYTES NFR BLD AUTO: 0.4 % — SIGNIFICANT CHANGE UP (ref 0–1.5)
INR BLD: 1.01 — SIGNIFICANT CHANGE UP (ref 0.88–1.16)
LYMPHOCYTES # BLD AUTO: 2.58 K/UL — SIGNIFICANT CHANGE UP (ref 1–3.3)
LYMPHOCYTES # BLD AUTO: 23.2 % — SIGNIFICANT CHANGE UP (ref 13–44)
MCHC RBC-ENTMCNC: 28.3 PG — SIGNIFICANT CHANGE UP (ref 27–34)
MCHC RBC-ENTMCNC: 30.4 GM/DL — LOW (ref 32–36)
MCV RBC AUTO: 93.1 FL — SIGNIFICANT CHANGE UP (ref 80–100)
MONOCYTES # BLD AUTO: 1.16 K/UL — HIGH (ref 0–0.9)
MONOCYTES NFR BLD AUTO: 10.4 % — SIGNIFICANT CHANGE UP (ref 2–14)
NEUTROPHILS # BLD AUTO: 7.07 K/UL — SIGNIFICANT CHANGE UP (ref 1.8–7.4)
NEUTROPHILS NFR BLD AUTO: 63.6 % — SIGNIFICANT CHANGE UP (ref 43–77)
NRBC # BLD: 0 /100 WBCS — SIGNIFICANT CHANGE UP (ref 0–0)
PLATELET # BLD AUTO: 756 K/UL — HIGH (ref 150–400)
PROTHROM AB SERPL-ACNC: 11.4 SEC — SIGNIFICANT CHANGE UP (ref 10–12.9)
RBC # BLD: 4.8 M/UL — SIGNIFICANT CHANGE UP (ref 3.8–5.2)
RBC # FLD: 13.1 % — SIGNIFICANT CHANGE UP (ref 10.3–14.5)
WBC # BLD: 11.13 K/UL — HIGH (ref 3.8–10.5)
WBC # FLD AUTO: 11.13 K/UL — HIGH (ref 3.8–10.5)

## 2019-10-08 NOTE — ED ADULT NURSE NOTE - OBJECTIVE STATEMENT
Patient t the ED with complaint of severe headache, was diag in aug 2019 with brain tumor and blood clot in the brain, placed on Tylenol because on blood thinners for the clot, usually is able to control the pain but pain was severe today, was unable to get up off couch, reports palpitation.

## 2019-10-08 NOTE — PLAN
[FreeTextEntry1] : MRI brain from 9/11/19 reviewed in detail by Dr. Oro with patient, which demonstrates LEFT cavernous meningioma.\par Headaches likely caused by sinus thrombosis.\par Follow up with Dr. Felder and consult with Dr. Neva Martin for comprehensive hematology workup.\par Education provided regarding Gamma Knife Radiosurgery. Risks, alternatives and benefits to Gamma Knife Radiosurgery discussed. Risks include but are not limited to cerebral edema, radionecrosis, seizures, continued tumor growth.\par Education packet regarding Gamma Knife Radiosurgery provided.\par Multiple questions answered to patient's satisfaction.\par \par -Due to acute sinus thrombosis, recommend MRI brain with and without contrast in 3 months to assess stability. If tumor demonstrates growth, will recommend GKRS.\par \par Patient verbalizes agreement and understanding with plan of care.

## 2019-10-08 NOTE — HISTORY OF PRESENT ILLNESS
[de-identified] : 63 year old woman with past medical history thyroid cancer s/p thyroidectomy in 2006 followed by radiation and asthma referred by Dr. Daquan Tejeda for consideration of Gamma Knife Radiosurgery for LEFT cavernous meningioma.\par \par She began to have RIGHT sided headaches at the end of August 2019 followed by nausea/vomiting. She saw her PCP and was treated for viral gastroenteritis. However, she continued to have persistent headaches and had MRI/MRA done in September 2019 which demonstrated a LEFT cavernous meningioma. \par \par She was seen by Dr. Tejeda on 9/26/19 and recommended evaluation for consideration of Gamma Knife Radiosurgery to meningioma. Additionally, the MRI did show possible narrowing of the LEFT intracavernous carotid narrowing. Due to this finding, Dr. Tejeda also recommended NOVA to evaluate for decreased blood flow due to possible LEFT carotid narrowing.\par \par Reviewed in tumor board on 9/30/19 and MRI demonstrated a large acute sagittal thrombosis. She was seen by Dr. Felder and is now currently on lovenox bid. She is also following up with Dr. Martin.\par \par She reports RIGHT sided headaches. Denies focal weakness, changes in vision/speech, numbness/tingling, seizures, dizziness.\par \par

## 2019-10-08 NOTE — DATA REVIEWED
[de-identified] : MRI brain with contrast 9/11/19:\par \par Impression: Sharply circumscribed 3 x 2.5 x 2 cm soft tissue mass filling and expanding the left cavernous sinus, consistent with a meningioma. As similarly noted in the routine MRI brain dated 9/10/19. Intense homogenous enhancement is demonstrated in the current postcontrast sequences. There is medial extension into the sella, posteromedial extension into the left lateral prepontine cistern and lateral extension into the parasellar region. There is mild 4 mm left medial temporal love and slight ovary of the left anterior tabitha. The component in the cavernous sinus is causing encasement and marked luminal narrowing of the proximal cavernous sinus is causing encasement and marked luminal narrowing of the proximal cavernous left ICA, as demonstrated on previous imaging.

## 2019-10-08 NOTE — ED ADULT NURSE NOTE - NSIMPLEMENTINTERV_GEN_ALL_ED
Implemented All Universal Safety Interventions:  [place] to call system. Call bell, personal items and telephone within reach. Instruct patient to call for assistance. Room bathroom lighting operational. Non-slip footwear when patient is off stretcher. Physically safe environment: no spills, clutter or unnecessary equipment. Stretcher in lowest position, wheels locked, appropriate side rails in place.

## 2019-10-08 NOTE — REASON FOR VISIT
[Referred By: _________] : Patient was referred by STEVEN [Consultation] : a consultation visit [FreeTextEntry1] : LEFT cavernous sinus tumor

## 2019-10-08 NOTE — ED ADULT TRIAGE NOTE - CHIEF COMPLAINT QUOTE
Pt states "I am having severe headaches which I have been having for a while but is worse today. I had an MRI before and I am being followed by MD Tejeda. I also feel like my heart is beating fast from the pain but my head is the main complaint".

## 2019-10-08 NOTE — ADDENDUM
[FreeTextEntry1] : 2019 \par  \par OFFICE CONSULTATION NOTE \par  \par  \par Re:	Jessica Michelle  \par :	53 \par MRN:  [id number] \par  \par Ms. Michelle is a 63-year-old woman who presents for consultation regarding a left cavernous sinus region enhancing mass. This was discovered incidentally in workup for headaches which started in August of this year. The headaches have no particular inciting or exacerbating factors associated with them. She does note that sometimes the headaches are more prominent on the right side. She was discovered to have both sinus thrombosis, for which she has seen Stroke Neurology, and anticoagulation was started, as well as what seems to be a left cavernous sinus meningioma. \par  \par On review of her MRI an approximately 2.5 x 1.7 x 2.8 cm homogenously enhancing mass which encompasses the cavernous sinus and medial aspect of the middle cranial fossa with extension into the left cerebellopontine angle mass is noted. There is some mild mass effect on the medial temporal lobe, and there appears to be some extension along the tentorial edge. \par  \par I discussed with Ms. Michelle that the imaging characteristics are certainly compatible with meningioma, and given its large size and encroachment within eloquent regions of the brain, I anticipate that treatment at this stage would be beneficial rather than awaiting the lesion to grow and insinuate any further. Certainly given that the lesion appears to be asymptomatic at this point, there is no urgency to treat. However, I think there are some distinct advantages to treating earlier rather than later. We discussed treatment options, including Gamma Knife radiosurgery which would be indicated in an attempt to slow or completely stop tumor growth. Given the large diffuse nature of this tumor, which encompasses the left cavernous sinus, I do not think that microsurgical resection is ideal.  \par  \par  \par Given her new diagnosis of sinus thrombosis, I would like her to undergo at least 3 months of treatment prior to us initiating Gamma Knife radiosurgical treatment. In the meantime I would like for her to have a followup MRI in 3 months so that we can better understand the tumor growth rate. I anticipate that the Gamma Knife treatment would be fractionated over 3 sessions, and I would utilize a mask. \par  \par We will plan to see Ms. Michelle in 3 months after the MRI has been obtained, and we will likely proceed with Gamma Knife radiosurgery after that followup. \par  \par  \par  \par  \par  \par Harjinder Oro M.D. \par  of Neurosurgery \par [facility] \[facility] Department of Neurosurgery \par [facility] \par [address] \par [facility], Third [facility] \par [place] [address] \par Tel: [phone] \par Email:  [email or web address] \par  \par  \par CHRISTINE/rlp DocuMed #[id number] \par  \par  \par

## 2019-10-09 ENCOUNTER — INPATIENT (INPATIENT)
Facility: HOSPITAL | Age: 64
LOS: 10 days | Discharge: HOME | DRG: 91 | End: 2019-10-20
Attending: PSYCHIATRY & NEUROLOGY | Admitting: PSYCHIATRY & NEUROLOGY
Payer: COMMERCIAL

## 2019-10-09 DIAGNOSIS — G08 INTRACRANIAL AND INTRASPINAL PHLEBITIS AND THROMBOPHLEBITIS: ICD-10-CM

## 2019-10-09 DIAGNOSIS — C73 MALIGNANT NEOPLASM OF THYROID GLAND: ICD-10-CM

## 2019-10-09 DIAGNOSIS — D49.6 NEOPLASM OF UNSPECIFIED BEHAVIOR OF BRAIN: ICD-10-CM

## 2019-10-09 DIAGNOSIS — Z90.09 ACQUIRED ABSENCE OF OTHER PART OF HEAD AND NECK: Chronic | ICD-10-CM

## 2019-10-09 LAB
ALBUMIN SERPL ELPH-MCNC: 4.2 G/DL — SIGNIFICANT CHANGE UP (ref 3.3–5)
ALP SERPL-CCNC: 68 U/L — SIGNIFICANT CHANGE UP (ref 40–120)
ALT FLD-CCNC: 144 U/L — HIGH (ref 10–45)
ANION GAP SERPL CALC-SCNC: 10 MMOL/L — SIGNIFICANT CHANGE UP (ref 5–17)
APPEARANCE UR: ABNORMAL
AST SERPL-CCNC: 76 U/L — HIGH (ref 10–40)
BILIRUB SERPL-MCNC: 0.4 MG/DL — SIGNIFICANT CHANGE UP (ref 0.2–1.2)
BILIRUB UR-MCNC: NEGATIVE — SIGNIFICANT CHANGE UP
BUN SERPL-MCNC: 16 MG/DL — SIGNIFICANT CHANGE UP (ref 7–23)
CALCIUM SERPL-MCNC: 9.2 MG/DL — SIGNIFICANT CHANGE UP (ref 8.4–10.5)
CHLORIDE SERPL-SCNC: 106 MMOL/L — SIGNIFICANT CHANGE UP (ref 96–108)
CK MB CFR SERPL CALC: 2 NG/ML — SIGNIFICANT CHANGE UP (ref 0–6.7)
CK SERPL-CCNC: 56 U/L — SIGNIFICANT CHANGE UP (ref 25–170)
CO2 SERPL-SCNC: 25 MMOL/L — SIGNIFICANT CHANGE UP (ref 22–31)
COLOR SPEC: YELLOW — SIGNIFICANT CHANGE UP
CREAT SERPL-MCNC: 0.83 MG/DL — SIGNIFICANT CHANGE UP (ref 0.5–1.3)
DIFF PNL FLD: ABNORMAL
GLUCOSE SERPL-MCNC: 101 MG/DL — HIGH (ref 70–99)
GLUCOSE UR QL: NEGATIVE — SIGNIFICANT CHANGE UP
KETONES UR-MCNC: NEGATIVE — SIGNIFICANT CHANGE UP
LEUKOCYTE ESTERASE UR-ACNC: ABNORMAL
NITRITE UR-MCNC: NEGATIVE — SIGNIFICANT CHANGE UP
PH UR: 6 — SIGNIFICANT CHANGE UP (ref 5–8)
POTASSIUM SERPL-MCNC: 4.1 MMOL/L — SIGNIFICANT CHANGE UP (ref 3.5–5.3)
POTASSIUM SERPL-SCNC: 4.1 MMOL/L — SIGNIFICANT CHANGE UP (ref 3.5–5.3)
PROT SERPL-MCNC: 7.1 G/DL — SIGNIFICANT CHANGE UP (ref 6–8.3)
PROT UR-MCNC: NEGATIVE MG/DL — SIGNIFICANT CHANGE UP
SODIUM SERPL-SCNC: 141 MMOL/L — SIGNIFICANT CHANGE UP (ref 135–145)
SP GR SPEC: 1.01 — SIGNIFICANT CHANGE UP (ref 1–1.03)
TROPONIN T SERPL-MCNC: <0.01 NG/ML — SIGNIFICANT CHANGE UP (ref 0–0.01)
UROBILINOGEN FLD QL: 0.2 E.U./DL — SIGNIFICANT CHANGE UP

## 2019-10-09 PROCEDURE — 70496 CT ANGIOGRAPHY HEAD: CPT | Mod: 26

## 2019-10-09 PROCEDURE — 70498 CT ANGIOGRAPHY NECK: CPT | Mod: 26

## 2019-10-09 PROCEDURE — 99223 1ST HOSP IP/OBS HIGH 75: CPT

## 2019-10-09 PROCEDURE — 99285 EMERGENCY DEPT VISIT HI MDM: CPT

## 2019-10-09 PROCEDURE — 70450 CT HEAD/BRAIN W/O DYE: CPT | Mod: 26,59

## 2019-10-09 RX ORDER — METOCLOPRAMIDE HCL 10 MG
10 TABLET ORAL ONCE
Refills: 0 | Status: COMPLETED | OUTPATIENT
Start: 2019-10-09 | End: 2019-10-09

## 2019-10-09 RX ORDER — ONDANSETRON 8 MG/1
4 TABLET, FILM COATED ORAL EVERY 6 HOURS
Refills: 0 | Status: DISCONTINUED | OUTPATIENT
Start: 2019-10-09 | End: 2019-10-20

## 2019-10-09 RX ORDER — ENOXAPARIN SODIUM 100 MG/ML
100 INJECTION SUBCUTANEOUS EVERY 24 HOURS
Refills: 0 | Status: DISCONTINUED | OUTPATIENT
Start: 2019-10-09 | End: 2019-10-09

## 2019-10-09 RX ORDER — LEVOTHYROXINE SODIUM 125 MCG
200 TABLET ORAL DAILY
Refills: 0 | Status: DISCONTINUED | OUTPATIENT
Start: 2019-10-09 | End: 2019-10-20

## 2019-10-09 RX ORDER — SODIUM CHLORIDE 9 MG/ML
1000 INJECTION INTRAMUSCULAR; INTRAVENOUS; SUBCUTANEOUS ONCE
Refills: 0 | Status: COMPLETED | OUTPATIENT
Start: 2019-10-09 | End: 2019-10-09

## 2019-10-09 RX ORDER — ACETAMINOPHEN 500 MG
1000 TABLET ORAL ONCE
Refills: 0 | Status: COMPLETED | OUTPATIENT
Start: 2019-10-09 | End: 2019-10-09

## 2019-10-09 RX ORDER — ENOXAPARIN SODIUM 100 MG/ML
65 INJECTION SUBCUTANEOUS EVERY 12 HOURS
Refills: 0 | Status: DISCONTINUED | OUTPATIENT
Start: 2019-10-10 | End: 2019-10-11

## 2019-10-09 RX ORDER — TRAMADOL HYDROCHLORIDE 50 MG/1
25 TABLET ORAL EVERY 4 HOURS
Refills: 0 | Status: DISCONTINUED | OUTPATIENT
Start: 2019-10-09 | End: 2019-10-13

## 2019-10-09 RX ORDER — DEXAMETHASONE 0.5 MG/5ML
ELIXIR ORAL
Refills: 0 | Status: DISCONTINUED | OUTPATIENT
Start: 2019-10-09 | End: 2019-10-10

## 2019-10-09 RX ORDER — ACETAZOLAMIDE 250 MG/1
250 TABLET ORAL EVERY 12 HOURS
Refills: 0 | Status: DISCONTINUED | OUTPATIENT
Start: 2019-10-09 | End: 2019-10-20

## 2019-10-09 RX ORDER — DEXAMETHASONE 0.5 MG/5ML
2 ELIXIR ORAL EVERY 6 HOURS
Refills: 0 | Status: DISCONTINUED | OUTPATIENT
Start: 2019-10-10 | End: 2019-10-10

## 2019-10-09 RX ORDER — ACETAMINOPHEN 500 MG
325 TABLET ORAL EVERY 4 HOURS
Refills: 0 | Status: DISCONTINUED | OUTPATIENT
Start: 2019-10-09 | End: 2019-10-15

## 2019-10-09 RX ORDER — DOCUSATE SODIUM 100 MG
100 CAPSULE ORAL THREE TIMES A DAY
Refills: 0 | Status: DISCONTINUED | OUTPATIENT
Start: 2019-10-09 | End: 2019-10-20

## 2019-10-09 RX ORDER — DEXAMETHASONE 0.5 MG/5ML
4 ELIXIR ORAL EVERY 6 HOURS
Refills: 0 | Status: COMPLETED | OUTPATIENT
Start: 2019-10-09 | End: 2019-10-10

## 2019-10-09 RX ORDER — ACETAMINOPHEN 500 MG
650 TABLET ORAL EVERY 6 HOURS
Refills: 0 | Status: DISCONTINUED | OUTPATIENT
Start: 2019-10-09 | End: 2019-10-09

## 2019-10-09 RX ORDER — INFLUENZA VIRUS VACCINE 15; 15; 15; 15 UG/.5ML; UG/.5ML; UG/.5ML; UG/.5ML
0.5 SUSPENSION INTRAMUSCULAR ONCE
Refills: 0 | Status: DISCONTINUED | OUTPATIENT
Start: 2019-10-09 | End: 2019-10-20

## 2019-10-09 RX ORDER — DEXAMETHASONE 0.5 MG/5ML
10 ELIXIR ORAL ONCE
Refills: 0 | Status: COMPLETED | OUTPATIENT
Start: 2019-10-09 | End: 2019-10-09

## 2019-10-09 RX ADMIN — Medication 325 MILLIGRAM(S): at 21:19

## 2019-10-09 RX ADMIN — SODIUM CHLORIDE 1000 MILLILITER(S): 9 INJECTION INTRAMUSCULAR; INTRAVENOUS; SUBCUTANEOUS at 00:25

## 2019-10-09 RX ADMIN — TRAMADOL HYDROCHLORIDE 25 MILLIGRAM(S): 50 TABLET ORAL at 17:46

## 2019-10-09 RX ADMIN — Medication 1000 MILLIGRAM(S): at 03:54

## 2019-10-09 RX ADMIN — Medication 400 MILLIGRAM(S): at 00:36

## 2019-10-09 RX ADMIN — Medication 1 CAPSULE(S): at 13:49

## 2019-10-09 RX ADMIN — Medication 1 CAPSULE(S): at 21:19

## 2019-10-09 RX ADMIN — Medication 1000 MILLIGRAM(S): at 01:30

## 2019-10-09 RX ADMIN — Medication 100 MILLIGRAM(S): at 06:16

## 2019-10-09 RX ADMIN — ACETAZOLAMIDE 250 MILLIGRAM(S): 250 TABLET ORAL at 19:47

## 2019-10-09 RX ADMIN — Medication 650 MILLIGRAM(S): at 07:15

## 2019-10-09 RX ADMIN — ENOXAPARIN SODIUM 100 MILLIGRAM(S): 100 INJECTION SUBCUTANEOUS at 06:16

## 2019-10-09 RX ADMIN — Medication 1 CAPSULE(S): at 14:39

## 2019-10-09 RX ADMIN — TRAMADOL HYDROCHLORIDE 25 MILLIGRAM(S): 50 TABLET ORAL at 22:10

## 2019-10-09 RX ADMIN — TRAMADOL HYDROCHLORIDE 25 MILLIGRAM(S): 50 TABLET ORAL at 16:46

## 2019-10-09 RX ADMIN — Medication 102 MILLIGRAM(S): at 19:47

## 2019-10-09 RX ADMIN — Medication 100 MILLIGRAM(S): at 22:10

## 2019-10-09 RX ADMIN — Medication 650 MILLIGRAM(S): at 06:15

## 2019-10-09 RX ADMIN — SODIUM CHLORIDE 1000 MILLILITER(S): 9 INJECTION INTRAMUSCULAR; INTRAVENOUS; SUBCUTANEOUS at 03:54

## 2019-10-09 RX ADMIN — Medication 325 MILLIGRAM(S): at 20:19

## 2019-10-09 RX ADMIN — Medication 1 CAPSULE(S): at 20:18

## 2019-10-09 RX ADMIN — Medication 200 MICROGRAM(S): at 06:16

## 2019-10-09 RX ADMIN — Medication 10 MILLIGRAM(S): at 00:36

## 2019-10-09 RX ADMIN — Medication 325 MILLIGRAM(S): at 10:45

## 2019-10-09 RX ADMIN — Medication 4 MILLIGRAM(S): at 22:10

## 2019-10-09 RX ADMIN — Medication 325 MILLIGRAM(S): at 09:59

## 2019-10-09 NOTE — H&P ADULT - HISTORY OF PRESENT ILLNESS
63F with a h/o asthma, thyroid cancer in the past, intracavernous tumor/meningioma, and recently dx large acute sinus sagittal thrombosis on Lovenox x 1 week (followed by Dr. Tejeda) who p/w worsening headache tonight. She has been experiencing HAs x 3 months, left supraorbital region, previous they were controlled y NSAIDs, but since she has been dx with thrombosis on lovenox she cannot take NSAIDs. She has been taking Tylenol and Fioricet but these are not really helping. Pt woke up two hours PTA with severe exacerbation of her headache on the left side. No vision or speech change, no n/v, no f/c, no neck pain, no trauma or fall.  Patient was placed on Lovenox by Dr. Felder.  she is being admitted to get a MRV of brain.  Patient states that she quit smoking x 1 week due to severe headache. She is not using nicotine patch.

## 2019-10-09 NOTE — H&P ADULT - ATTENDING COMMENTS
Patient seen and examined by me on 10/9/19. She has a known left cavernous sinus meningioma and dural sinus thrombosis which is being managed by Dr. Felder with lovenox anticoagulation. She now presents with significantly worsened headaches She is intact on examination. CTA showing persistent sinus thrombosis. Plan for MRV head, stroke neurology consultation, steroid taper for intractable headaches, continue anticoagulation per Stroke service, continue to monitor neurologically.    Harjinder Oro M.D.

## 2019-10-09 NOTE — H&P ADULT - NSHPLABSRESULTS_GEN_ALL_CORE
13.6   11.13 )-----------( 756      ( 08 Oct 2019 23:23 )             44.7   10-08    141  |  106  |  16  ----------------------------<  101<H>  4.1   |  25  |  0.83    Ca    9.2      08 Oct 2019 23:23    TPro  7.1  /  Alb  4.2  /  TBili  0.4  /  DBili  x   /  AST  76<H>  /  ALT  144<H>  /  AlkPhos  68  10-08  PT/INR - ( 08 Oct 2019 23:24 )   PT: 11.4 sec;   INR: 1.01          PTT - ( 08 Oct 2019 23:24 )  PTT:39.4 sec  head CT: FINDINGS:    VENTRICLES AND SULCI: Parenchymal volume is commensurate with patient   age. No hydrocephalus.  INTRA-AXIAL: No intracranial mass, acute hemorrhage, midline shift or   acute transcortical infarct is seen.. Few patchy areas of hypodensity in   the subcortical white matter consistent with microangiopathic disease.  EXTRA-AXIAL: No extra-axial fluid collection is present. Subtle   ill-defined area of hyperdensity in the left middle cranial fossa likely   represents known cavernous sinus meningioma. No significant edema seen   the adjacent left temporal lobe.   VISUALIZED SINUSES: No air-fluidlevels are identified. Mild mucosal   thickening in the ethmoid sinuses.  VISUALIZED MASTOIDS: Clear.  CALVARIUM: Normal.      IMPRESSION:  No acute intracranial hemorrhage, transcortical infarct, or mass effect.    Ill-defined of hyperdensity in theleft middle cranial fossa likely   represents known cavernous meningioma.

## 2019-10-09 NOTE — H&P ADULT - NSHPPHYSICALEXAM_GEN_ALL_CORE
Gen: well developed female, calm and cooperative, in no apparent distress.  A&O x 3, PERRL, pupils are bilaterally constricted at 2mm. EOMI  No gaze preference. No fascial palsy.  No pronator drift  Lung: good air entry, clear lung sound.  Heart: S1S2 regular.  Abd: soft nn tender, +BS  Ext: No edema  No focal motor deficit. 5/5 x 4  no sensory deficit to touch.

## 2019-10-09 NOTE — ED PROVIDER NOTE - PROGRESS NOTE DETAILS
headache improved, CTs with no emergency pathology. Pt seen by NS and will be admitted to their service for further mgmt. Pt with no complaints at this time.

## 2019-10-09 NOTE — H&P ADULT - NSICDXPASTSURGICALHX_GEN_ALL_CORE_FT
.Palliative Care Progress Note    Patient: Trino Brown Date: 2018   : 1960 Attending: Brian Guerrero DO   57 year old male      Code Status: Full Resuscitation    Power of  information:    Neena, wife - not activated        Emergency contact information:  Primary Emergency Contact: NEENA BROWN, Home Phone: [phone]    Subjective:  Just completed worked with PT.  States he's quite tired and was short of breath during therapy, but has since recovered.  Feels that he has turned a corner, although progress is slow.  States he is eager to transfer to the floor, which may happen today.  He has back pain 8/10.  Heat is helping.  He continues to have poor sleep at night despite melatonin, Zyprexa and Clonazepam.      Vital Last Value 24 Hour Range   Temperature 97.5 °F (36.4 °C) (18 0700) Temp  Min: 97.5 °F (36.4 °C)  Max: 98.6 °F (37 °C)    Pulse 96 (18 0856) Pulse  Min: 82  Max: 107   Respiratory 26 (18 0856) Resp  Min: 16  Max: 34   Non-Invasive  Blood Pressure (!) 81/58 (04/15/18 1215) No Data Recorded   Pulse Oximetry 92 % (18 0856) SpO2  Min: 92 %  Max: 100 %     Vital First Value Last Value   Weight Weight: 59.9 kg (18 0614) 64.9 kg (18 0700)   Height N/A 5' 7\" (170.2 cm) (18 0845)   BMI N/A 22.46 (18 0700)     Last Bowel Movement: 1 (moderate, loose) (18 1100)    I/O last 3 completed shifts:  In: 1902.7 [P.O.:330; I.V.:459.7; NG/GT:1113]  Out: 5090 [Urine:5050; Drains:40]    Physical Exam:   Sitting in chair.  Good eye contact.  Alert.  In NAD.  Unlabored breathing even with conversation.  BLE 1+ edema.    Laboratory Results:    Lab Results   Component Value Date    SODIUM 132 (L) 2018    POTASSIUM 4.2 2018    BUN 26 (H) 2018    CREATININE 0.65 (L) 2018    ALBUMIN 2.4 (L) 2018    INR 2.1 2018    WBC 7.0 2018    HGB 7.5 (L) 2018     2018       Medications:  Scheduled:    • warfarin  3 mg Oral Once   • losartan  50 mg Oral Daily   • triamcinolone   Topical BID   • sertraline  50 mg PEG Tube Daily   • mupirocin   Topical Daily   • levothyroxine  125 mcg PEG Tube QAM AC   • nystatin  500,000 Units Swish & Swallow TID AC   • melatonin  8 mg Per G Tube Nightly   • OLANZapine  5 mg Oral Nightly   • clonazePAM  0.5 mg PEG Tube 2 times per day   • acyclovir  400 mg PEG Tube 2 times per day   • clopidogrel  75 mg PEG Tube Daily   • eplerenone  25 mg PEG Tube Daily   • lactobacillus acidophilus  2 tablet Per G Tube BID   • pantoprazole  40 mg PEG Tube 2 times per day   • polyethylene glycol  17 g PEG Tube Daily   • tamsulosin  0.4 mg PEG Tube Q Evening   • vitamin - therapeutic multivitamins w/minerals  1 tablet PEG Tube Daily   • budesonide  0.25 mg Nebulization BID Resp   • albuterol-ipratropium 2.5 mg/0.5 mg  3 mL Nebulization 4x Daily Resp   • clotrimazole  10 mg Oral TID   • sodium chloride (PF)  10 mL Injection 3 times per day   • ceFAZolin  2,000 mg Intravenous 3 times per day   • WARFARIN - PHYSICIAN MONITORED 1 each  1 each Does not apply Q Evening   • lidocaine  2 patch Transdermal Daily   • lidocaine  1 patch Transdermal Nightly   • testosterone  5 g Transdermal Daily           Assessment:  LVAD as BTT (status 7)  Acute on chronic hypercapnic respiratory failure  Anxiety  Protein calorie malnutrition  Deconditioning      Plans/Recommendations:  1.  Palliative Care to continue to follow for emotional support.        Time greater than 15 minutes.  With greater than 50% of the time in counseling and coordination.    Natali Malik, ASH  [phone]   PAST SURGICAL HISTORY:  H/O thyroidectomy

## 2019-10-09 NOTE — ED PROVIDER NOTE - OBJECTIVE STATEMENT
63F with a h/o asthma, thyroid cancer in the past, sizable intracavernous apparent meningioma, and recently dx large acute sinus saggital thrombosis on lovenox x 1 week (followed by Dr. Tejeda) who p/w worsenign headache 63F with a h/o asthma, thyroid cancer in the past, intracavernous tumor/meningioma, and recently dx large acute sinus sagittal thrombosis on Lovenox x 1 week (followed by Dr. Tejeda) who p/w worsening headache tonight. She has been experiencing HAs x 3 months, left supraorbital region, previous they were controlled y NSAIDs, but since she has been dx with thrombosis on lovenox she cannot take NSAIDs. She has been taking Tylenol and Fioricet but these are not really helping. Pt woke up two hours PTA with severe exacerbation of her headache on the left side. No vision or speech change, no n/v, no f/c, no neck pain, no trauma or fall.  Pt is being followed by Dr. Tejeda, scheduled for a MRA nova this thursday as an outpt.

## 2019-10-09 NOTE — H&P ADULT - NSICDXPASTMEDICALHX_GEN_ALL_CORE_FT
PAST MEDICAL HISTORY:  Asthma     Brain tumor meningioma    Thrombosis, superior sagittal sinus     Thyroid carcinoma

## 2019-10-09 NOTE — ED PROVIDER NOTE - PHYSICAL EXAMINATION
GEN: Well appearing, well nourished, awake, alert, oriented to person, place, time/situation and in distress due to pain.  ENT: Airway patent, Nasal mucosa clear. Mouth with normal mucosa.  EYES: Clear bilaterally. perrl, eomi  RESPIRATORY: Breathing comfortably with normal RR.  CARDIAC: Regular rate and rhythm  ABDOMEN: Soft, nontender, +bowel sounds, no rebound, rigidity, or guarding.  MSK: Range of motion is not limited, no deformities noted.  NEURO: Alert and oriented x 3. Cn 2-12 intact. Strength 5/5 and sensation intact in all 4 extremities. no pronator drift. FTN normal. Neg Rhomberg. Gait normal.   SKIN: Skin normal color for race, warm, dry and intact. No evidence of rash.  PSYCH: Alert and oriented to person, place, time/situation. normal mood and affect. no apparent risk to self or others.

## 2019-10-09 NOTE — ED PROVIDER NOTE - CLINICAL SUMMARY MEDICAL DECISION MAKING FREE TEXT BOX
Pt with above PMHx with acute exacerbation of headache, VSS, no focal neuro deficits, will get labs and CT head. CTA to rule out acute pathology. Neurosurgery consult, Pt give IVFs, reglan and tylenol for headache. Dispo pending workup.

## 2019-10-09 NOTE — CHART NOTE - NSCHARTNOTEFT_GEN_A_CORE
Neurosurgical Indications for Screening Dopplers on Admission:       1) Known hypercoagulation disorder (h/o VTE, thrombophilia, HIT, etc.)   2) Admitted from prolonged stay from another institution (straight forward ER transfers not included)  3) Presenting with significant leg immobility   4) Presenting with signs and symptoms of VTE?    5) With significant critical illness, Including "found down" for unknown period of time in HPI  6) With significant neurotrauma (TBI, SCI / TLS spine fractures)   7) Who are comatose   8) With known malignancy (e.g. glioblastoma multiforme, meningioma, etc.). Excludes IA chemo 23hr observation stays  9) On hemodialysis   10) Who have received platelet transfusion or antithrombotic reversal agents recently   11) Who have had recent major orthopedic surgery          Screening dopplers indicated?   Y _   N _x    DVT Prophylaxis:  _x SCD's   _x chemoprophylaxis

## 2019-10-09 NOTE — PROGRESS NOTE ADULT - SUBJECTIVE AND OBJECTIVE BOX
Dr. Felder's recommendations via the phone are appreciated: Start tonight SQL 65 BID, Decadron , Diamox 250 BID.

## 2019-10-10 ENCOUNTER — APPOINTMENT (OUTPATIENT)
Dept: MRI IMAGING | Facility: HOSPITAL | Age: 64
End: 2019-10-10

## 2019-10-10 DIAGNOSIS — D47.3 ESSENTIAL (HEMORRHAGIC) THROMBOCYTHEMIA: ICD-10-CM

## 2019-10-10 LAB
ANION GAP SERPL CALC-SCNC: 12 MMOL/L — SIGNIFICANT CHANGE UP (ref 5–17)
BUN SERPL-MCNC: 12 MG/DL — SIGNIFICANT CHANGE UP (ref 7–23)
CALCIUM SERPL-MCNC: 9.1 MG/DL — SIGNIFICANT CHANGE UP (ref 8.4–10.5)
CHLORIDE SERPL-SCNC: 107 MMOL/L — SIGNIFICANT CHANGE UP (ref 96–108)
CO2 SERPL-SCNC: 21 MMOL/L — LOW (ref 22–31)
CREAT SERPL-MCNC: 0.94 MG/DL — SIGNIFICANT CHANGE UP (ref 0.5–1.3)
GLUCOSE SERPL-MCNC: 140 MG/DL — HIGH (ref 70–99)
HCT VFR BLD CALC: 45.5 % — HIGH (ref 34.5–45)
HCV AB S/CO SERPL IA: 0.21 S/CO — SIGNIFICANT CHANGE UP
HCV AB SERPL-IMP: SIGNIFICANT CHANGE UP
HGB BLD-MCNC: 13.9 G/DL — SIGNIFICANT CHANGE UP (ref 11.5–15.5)
LMWH PPP CHRO-ACNC: 0.66 IU/ML — SIGNIFICANT CHANGE UP (ref 0.5–1.1)
MCHC RBC-ENTMCNC: 28.4 PG — SIGNIFICANT CHANGE UP (ref 27–34)
MCHC RBC-ENTMCNC: 30.5 GM/DL — LOW (ref 32–36)
MCV RBC AUTO: 93 FL — SIGNIFICANT CHANGE UP (ref 80–100)
NRBC # BLD: 0 /100 WBCS — SIGNIFICANT CHANGE UP (ref 0–0)
PLATELET # BLD AUTO: 763 K/UL — HIGH (ref 150–400)
POTASSIUM SERPL-MCNC: 4.3 MMOL/L — SIGNIFICANT CHANGE UP (ref 3.5–5.3)
POTASSIUM SERPL-SCNC: 4.3 MMOL/L — SIGNIFICANT CHANGE UP (ref 3.5–5.3)
RBC # BLD: 4.89 M/UL — SIGNIFICANT CHANGE UP (ref 3.8–5.2)
RBC # FLD: 13.2 % — SIGNIFICANT CHANGE UP (ref 10.3–14.5)
SODIUM SERPL-SCNC: 140 MMOL/L — SIGNIFICANT CHANGE UP (ref 135–145)
WBC # BLD: 11.65 K/UL — HIGH (ref 3.8–10.5)
WBC # FLD AUTO: 11.65 K/UL — HIGH (ref 3.8–10.5)

## 2019-10-10 PROCEDURE — 99232 SBSQ HOSP IP/OBS MODERATE 35: CPT

## 2019-10-10 PROCEDURE — 70544 MR ANGIOGRAPHY HEAD W/O DYE: CPT | Mod: 26,76

## 2019-10-10 PROCEDURE — 93970 EXTREMITY STUDY: CPT | Mod: 26

## 2019-10-10 PROCEDURE — 99223 1ST HOSP IP/OBS HIGH 75: CPT

## 2019-10-10 PROCEDURE — 99222 1ST HOSP IP/OBS MODERATE 55: CPT

## 2019-10-10 RX ORDER — HYDROMORPHONE HYDROCHLORIDE 2 MG/ML
30 INJECTION INTRAMUSCULAR; INTRAVENOUS; SUBCUTANEOUS
Refills: 0 | Status: DISCONTINUED | OUTPATIENT
Start: 2019-10-10 | End: 2019-10-13

## 2019-10-10 RX ORDER — SODIUM CHLORIDE 9 MG/ML
1000 INJECTION INTRAMUSCULAR; INTRAVENOUS; SUBCUTANEOUS
Refills: 0 | Status: DISCONTINUED | OUTPATIENT
Start: 2019-10-10 | End: 2019-10-11

## 2019-10-10 RX ORDER — HYDROMORPHONE HYDROCHLORIDE 2 MG/ML
0.5 INJECTION INTRAMUSCULAR; INTRAVENOUS; SUBCUTANEOUS ONCE
Refills: 0 | Status: DISCONTINUED | OUTPATIENT
Start: 2019-10-10 | End: 2019-10-10

## 2019-10-10 RX ORDER — ONDANSETRON 8 MG/1
4 TABLET, FILM COATED ORAL EVERY 8 HOURS
Refills: 0 | Status: DISCONTINUED | OUTPATIENT
Start: 2019-10-10 | End: 2019-10-10

## 2019-10-10 RX ORDER — DEXAMETHASONE 0.5 MG/5ML
4 ELIXIR ORAL ONCE
Refills: 0 | Status: COMPLETED | OUTPATIENT
Start: 2019-10-10 | End: 2019-10-10

## 2019-10-10 RX ORDER — DEXAMETHASONE 0.5 MG/5ML
6 ELIXIR ORAL EVERY 6 HOURS
Refills: 0 | Status: DISCONTINUED | OUTPATIENT
Start: 2019-10-10 | End: 2019-10-12

## 2019-10-10 RX ORDER — TRAMADOL HYDROCHLORIDE 50 MG/1
25 TABLET ORAL ONCE
Refills: 0 | Status: DISCONTINUED | OUTPATIENT
Start: 2019-10-10 | End: 2019-10-10

## 2019-10-10 RX ADMIN — Medication 1 CAPSULE(S): at 09:14

## 2019-10-10 RX ADMIN — ENOXAPARIN SODIUM 65 MILLIGRAM(S): 100 INJECTION SUBCUTANEOUS at 05:05

## 2019-10-10 RX ADMIN — Medication 100 MILLIGRAM(S): at 22:45

## 2019-10-10 RX ADMIN — Medication 4 MILLIGRAM(S): at 15:31

## 2019-10-10 RX ADMIN — Medication 4 MILLIGRAM(S): at 09:14

## 2019-10-10 RX ADMIN — TRAMADOL HYDROCHLORIDE 25 MILLIGRAM(S): 50 TABLET ORAL at 11:26

## 2019-10-10 RX ADMIN — Medication 4 MILLIGRAM(S): at 16:42

## 2019-10-10 RX ADMIN — HYDROMORPHONE HYDROCHLORIDE 30 MILLILITER(S): 2 INJECTION INTRAMUSCULAR; INTRAVENOUS; SUBCUTANEOUS at 22:35

## 2019-10-10 RX ADMIN — Medication 4 MILLIGRAM(S): at 05:02

## 2019-10-10 RX ADMIN — Medication 1 CAPSULE(S): at 09:29

## 2019-10-10 RX ADMIN — TRAMADOL HYDROCHLORIDE 25 MILLIGRAM(S): 50 TABLET ORAL at 10:21

## 2019-10-10 RX ADMIN — Medication 1 CAPSULE(S): at 13:30

## 2019-10-10 RX ADMIN — Medication 200 MICROGRAM(S): at 05:06

## 2019-10-10 RX ADMIN — HYDROMORPHONE HYDROCHLORIDE 0.5 MILLIGRAM(S): 2 INJECTION INTRAMUSCULAR; INTRAVENOUS; SUBCUTANEOUS at 18:39

## 2019-10-10 RX ADMIN — ONDANSETRON 4 MILLIGRAM(S): 8 TABLET, FILM COATED ORAL at 22:00

## 2019-10-10 RX ADMIN — SODIUM CHLORIDE 75 MILLILITER(S): 9 INJECTION INTRAMUSCULAR; INTRAVENOUS; SUBCUTANEOUS at 22:32

## 2019-10-10 RX ADMIN — TRAMADOL HYDROCHLORIDE 25 MILLIGRAM(S): 50 TABLET ORAL at 16:42

## 2019-10-10 RX ADMIN — ACETAZOLAMIDE 250 MILLIGRAM(S): 250 TABLET ORAL at 05:02

## 2019-10-10 RX ADMIN — TRAMADOL HYDROCHLORIDE 25 MILLIGRAM(S): 50 TABLET ORAL at 15:31

## 2019-10-10 RX ADMIN — HYDROMORPHONE HYDROCHLORIDE 0.5 MILLIGRAM(S): 2 INJECTION INTRAMUSCULAR; INTRAVENOUS; SUBCUTANEOUS at 19:02

## 2019-10-10 RX ADMIN — Medication 1 CAPSULE(S): at 13:25

## 2019-10-10 RX ADMIN — ACETAZOLAMIDE 250 MILLIGRAM(S): 250 TABLET ORAL at 17:07

## 2019-10-10 RX ADMIN — TRAMADOL HYDROCHLORIDE 25 MILLIGRAM(S): 50 TABLET ORAL at 16:46

## 2019-10-10 RX ADMIN — Medication 1 CAPSULE(S): at 05:02

## 2019-10-10 RX ADMIN — ENOXAPARIN SODIUM 65 MILLIGRAM(S): 100 INJECTION SUBCUTANEOUS at 17:07

## 2019-10-10 RX ADMIN — Medication 100 MILLIGRAM(S): at 13:30

## 2019-10-10 NOTE — CONSULT NOTE ADULT - SUBJECTIVE AND OBJECTIVE BOX
63F with a h/o asthma, thyroid cancer in the past, intracavernous tumor/meningioma, and recently dx large acute sinus sagittal thrombosis, who p/w worsening intractable headache x 3 months. Ophthalmology consulted to r/o papilledema.    Pt denies any visual complaints, incl floaters, flashes, curtains, shadows, transient vision loss or diplopia. Reports having a complete eye exam 1 week ago as an outpatient which was normal.     Va(cc) near: 20/30- OD, 20/30- OS  P RR no APD    PLE  LA wnl OU  CS wq ou  K clear OU  AC formed OU  I rr OU  Tpen 18 OD, 17 OS    DFE  L NS OU  V clear OU  D spf OU  M flat OU  V wnl OU  P flat 360 OU    MRV pending

## 2019-10-10 NOTE — CONSULT NOTE ADULT - SUBJECTIVE AND OBJECTIVE BOX
Stroke Consult Note     HPI:  63year - old female with a PMHx of Asthma, Thyroid ca, Intracavernous tumor/meningioma, recently diagnosed with large acute sinus sagittal thrombosis on Lovenox x 1 week (followed by Dr. Tejeda) presented with worsening headache on 10/9/2019. Neurology consulted as Dr. Felder has been following this patient as an outpatient and placed her on Lovenox. She stated she has been experiencing headaches for the past 3 months, localized to the right supraorbital region. Previously, they were controlled with NSAIDs, but now she is unable to take NSAIDs due to her dx with thrombosis on Lovenox. Patient noted she quit smoking due to the headache and is not using nicotine patches.      Today 10/10/2019, NIHSS 0, she reports a 3/10 constant right-sided headache (10/10 prior) and nausea, minimally relieved by hot packs. She reports no relief from Fioricet and Tramadol. She currently denies dizziness, blurry vision, diplopia, and other visual changes.     PAST MEDICAL & SURGICAL HISTORY:  Thyroid carcinoma  Asthma  Thrombosis, superior sagittal sinus  Brain tumor: meningioma  H/O thyroidectomy    FAMILY HISTORY:    SOCIAL HISTORY:  Quit smoking x 1 week due to headache     ROS:  Constitutional: No fever, weight loss or fatigue  Eyes: No blurry vision, visual disturbances, or discharge  ENMT:  No difficulty hearing, tinnitus, vertigo; No sinus or throat pain  Neck: No pain or stiffness  Respiratory: No cough, wheezing, chills or hemoptysis  Cardiovascular: No chest pain, palpitations, shortness of breath, dizziness or leg swelling  Gastrointestinal: No abdominal pain. No nausea, vomiting or hematemesis; No diarrhea or constipation. No hematochezia.  Genitourinary: No dysuria, frequency, hematuria or incontinence  Neurological: As per HPI    MEDICATIONS  (STANDING):  acetazolamide    Tablet 250 milliGRAM(s) Oral every 12 hours  dexamethasone     Tablet 4 milliGRAM(s) Oral every 6 hours  dexamethasone     Tablet 2 milliGRAM(s) Oral every 6 hours  dexamethasone     Tablet   Oral   docusate sodium 100 milliGRAM(s) Oral three times a day  enoxaparin Injectable 65 milliGRAM(s) SubCutaneous every 12 hours  influenza   Vaccine 0.5 milliLiter(s) IntraMuscular once  levothyroxine 200 MICROGram(s) Oral daily    MEDICATIONS  (PRN):  acetaminophen   Tablet .. 325 milliGRAM(s) Oral every 4 hours PRN Moderate Pain (4 - 6)  acetaminophen 300 mG/butalbital 50 mG/ caffeine 40 mG 1 Capsule(s) Oral every 4 hours PRN headache  ondansetron Injectable 4 milliGRAM(s) IV Push every 6 hours PRN Nausea and/or Vomiting  traMADol 25 milliGRAM(s) Oral every 4 hours PRN Severe Pain (7 - 10)      Allergies  penicillin (Hives)    Vital Signs Last 24 Hrs  T(C): 36.7 (10 Oct 2019 12:25), Max: 36.8 (09 Oct 2019 22:08)  T(F): 98.1 (10 Oct 2019 12:25), Max: 98.3 (09 Oct 2019 22:08)  HR: 48 (10 Oct 2019 12:25) (48 - 55)  BP: 110/65 (10 Oct 2019 12:25) (100/61 - 127/65)  BP(mean): --  RR: 17 (10 Oct 2019 12:25) (16 - 18)  SpO2: 96% (10 Oct 2019 12:25) (94% - 96%)    PHYSICAL EXAM:  Constitutional: Alert, lying comfortably in bed.     Neurologic:  -Mental status: Awake, oriented to person, age, and month. Speech fluent, speaking in full sentences. Intact naming, able to describe picture in full. No dysarthria, no aphasia. Attention/concentration intact.    -Cranial nerves:  II: Visual fields full to finger coutining. Pupils PERRL bilaterally.   III, IV, VI: EOMS intact without nystagmus.  V: Facial sensation V1 through V3 grossly intact bilaterally.  VII: Face is symmetric with normal eye closure. Able to raise eyebrows. No facial droop.  XI: Head turning and shoulder shrug intact.  -Motor:  Normal bulk and tone. Strength 5/5 in bilateral upper extremities,  strength 5/5. No pronator drift. Strength 5/5 in bilateral lower extremities, dorsiflexion and plantar flexion of toes fully intact.   -Sensation: Upper and lower extremity sensation intact to light touch.  No neglect.   -Coordination: No dysmetria on finger-to-nose.   -Reflexes: Left and right toes downgoing bilaterally    NIHSS: 0     Fingerstick Blood Glucose: CAPILLARY BLOOD GLUCOSE       LABS:                        13.9   11.65 )-----------( 763      ( 10 Oct 2019 05:50 )             45.5     10-10    140  |  107  |  12  ----------------------------<  140<H>  4.3   |  21<L>  |  0.94    Ca    9.1      10 Oct 2019 05:50    TPro  7.1  /  Alb  4.2  /  TBili  0.4  /  DBili  x   /  AST  76<H>  /  ALT  144<H>  /  AlkPhos  68  10-08    PT/INR - ( 08 Oct 2019 23:24 )   PT: 11.4 sec;   INR: 1.01          PTT - ( 08 Oct 2019 23:24 )  PTT:39.4 sec  CARDIAC MARKERS ( 08 Oct 2019 23:23 )  x     / <0.01 ng/mL / 56 U/L / x     / 2.0 ng/mL      Urinalysis Basic - ( 09 Oct 2019 04:46 )    Color: Yellow / Appearance: Hazy / S.010 / pH: x  Gluc: x / Ketone: NEGATIVE  / Bili: Negative / Urobili: 0.2 E.U./dL   Blood: x / Protein: NEGATIVE mg/dL / Nitrite: NEGATIVE   Leuk Esterase: Moderate / RBC: < 5 /HPF / WBC Many /HPF   Sq Epi: x / Non Sq Epi: 0-5 /HPF / Bacteria: Present /HPF    RADIOLOGY & ADDITIONAL STUDIES:    - CT head 10/9   < from: CT Head No Cont (10.09.19 @ 02:03) >  IMPRESSION:  No acute intracranial hemorrhage, transcortical infarct, or mass effect.    Ill-defined of hyperdensity in theleft middle cranial fossa likely   represents known cavernous meningioma.   Microangiopathic disease.    - CTA head/ neck 10/9    < from: CT Angio Head w/ IV Cont (10.09.19 @ 02:04) >  IMPRESSION:   There is significant narrowing and nonvisualization of the left ICA in   the region of the cavernous segment consistent with high-grade   stenosis/occlusion which is associated with enhancing extra-axial lesion   consistent with history of cavernous meningioma. Left ICA reconstitutes   more distally in the clinoid region.    There is non opacification of the sagittal sinus consistent with history   of venous sagittal sinus thrombosis.       ASSESSMENT/PLAN:  63year - old female with a PMHx of Asthma, Thyroid ca, Intracavernous tumor/meningioma, Acute sinus sagittal thrombosis (on Lovenox BID x 1 week) presented with right- sided supraorbital headache x 3 months and new onset of nausea. Neurology consulted as Dr. Felder has been following this patient as an outpatient. CTA head/neck 10/9 revealed non-opacification of the sagittal sinus consistent with a history of venous sagittal sinus thrombosis. Neuro exam is unremarkable, NIHSS 0.  Patient has been recently admitted for MRV of the brain and as pr Dr. Felder, started on Diamox/Lovenox BID/Decadron.     1)Stroke Consult   -Continue Lovenox 65mg BID SQ   - Continue Diamox 250mg BID PO     3) Other tests:  - Awaiting MRV of brain     4)DVT ppx   - Lovenox BID (as above for anticoagulation)  -SCDs Stroke Consult Note     HPI:  63year - old female with a PMHx of Asthma, Thyroid ca, Intracavernous tumor/meningioma, large sinus sagittal thrombosis (dx 2019), started on Lovenox 10/1 as per Dr. Felder  (followed by Dr. Tejeda) presented with worsening headache on 10/9/2019. Neurology consulted as Dr. Felder has been following this patient as an outpatient. She stated she has been experiencing headaches for the past 3 months, localized to the right supraorbital region. Previously, they were controlled with NSAIDs, but now she is unable to take NSAIDs due to her dx with thrombosis. Patient noted she quit smoking due to the headache and is not using nicotine patches.     Today 10/10/2019, NIHSS 0, she reports a 3/10 constant right-sided headache (10/10 prior) and nausea, minimally relieved by hot packs. Patient states she had no relief from Fioricet and Tramadol, however, pain decreased in severity from 10/10 to 3/10. She currently denies dizziness, blurry vision, diplopia, visual changes, numbness, weakness, tingling, and slurred speech.     PAST MEDICAL & SURGICAL HISTORY:  Thyroid carcinoma  Asthma  Thrombosis, superior sagittal sinus  Brain tumor: meningioma  H/O thyroidectomy    FAMILY HISTORY:    SOCIAL HISTORY:  Quit smoking x 1 week due to headache     ROS:  Constitutional: No fever, weight loss or fatigue  Eyes: No blurry vision, visual disturbances, or discharge  ENMT:  No difficulty hearing, tinnitus, vertigo; No sinus or throat pain  Neck: No pain or stiffness  Respiratory: No cough, wheezing, chills or hemoptysis  Cardiovascular: No chest pain, palpitations, shortness of breath, dizziness or leg swelling  Gastrointestinal: No abdominal pain. No nausea, vomiting or hematemesis; No diarrhea or constipation. No hematochezia.  Genitourinary: No dysuria, frequency, hematuria or incontinence  Neurological: As per HPI    MEDICATIONS  (STANDING):  acetazolamide    Tablet 250 milliGRAM(s) Oral every 12 hours  dexamethasone     Tablet 4 milliGRAM(s) Oral every 6 hours  dexamethasone     Tablet 2 milliGRAM(s) Oral every 6 hours  dexamethasone     Tablet   Oral   docusate sodium 100 milliGRAM(s) Oral three times a day  enoxaparin Injectable 65 milliGRAM(s) SubCutaneous every 12 hours  influenza   Vaccine 0.5 milliLiter(s) IntraMuscular once  levothyroxine 200 MICROGram(s) Oral daily    MEDICATIONS  (PRN):  acetaminophen   Tablet .. 325 milliGRAM(s) Oral every 4 hours PRN Moderate Pain (4 - 6)  acetaminophen 300 mG/butalbital 50 mG/ caffeine 40 mG 1 Capsule(s) Oral every 4 hours PRN headache  ondansetron Injectable 4 milliGRAM(s) IV Push every 6 hours PRN Nausea and/or Vomiting  traMADol 25 milliGRAM(s) Oral every 4 hours PRN Severe Pain (7 - 10)      Allergies  penicillin (Hives)    Vital Signs Last 24 Hrs  T(C): 36.7 (10 Oct 2019 12:25), Max: 36.8 (09 Oct 2019 22:08)  T(F): 98.1 (10 Oct 2019 12:25), Max: 98.3 (09 Oct 2019 22:08)  HR: 48 (10 Oct 2019 12:25) (48 - 55)  BP: 110/65 (10 Oct 2019 12:25) (100/61 - 127/65)  BP(mean): --  RR: 17 (10 Oct 2019 12:25) (16 - 18)  SpO2: 96% (10 Oct 2019 12:25) (94% - 96%)    PHYSICAL EXAM:  Constitutional: Alert, lying comfortably in bed.     Neurologic:  -Mental status: Awake, oriented to person, age, and month. Speech fluent, speaking in full sentences. Intact naming, able to describe picture in full. No dysarthria, no aphasia. Attention/concentration intact.    -Cranial nerves:  II: Visual fields full to finger counting Pupils PERRL bilaterally.   III, IV, VI: EOMS intact without nystagmus.  V: Facial sensation V1 through V3 grossly intact bilaterally.  VII: Face is symmetric with normal eye closure. Able to raise eyebrows. No facial droop.  XI: Head turning and shoulder shrug intact.  -Motor:  Normal bulk and tone. Strength 5/5 in bilateral upper extremities,  strength 5/5. No pronator drift. Strength 5/5 in bilateral lower extremities, dorsiflexion and plantar flexion of toes fully intact.   -Sensation: Upper and lower extremity sensation intact to light touch.  No neglect.   -Coordination: No dysmetria on finger-to-nose.   -Reflexes: Left and right toes downgoing bilaterally.    NIHSS: 0     Fingerstick Blood Glucose: CAPILLARY BLOOD GLUCOSE       LABS:                        13.9   11.65 )-----------( 763      ( 10 Oct 2019 05:50 )             45.5     10-10    140  |  107  |  12  ----------------------------<  140<H>  4.3   |  21<L>  |  0.94    Ca    9.1      10 Oct 2019 05:50    TPro  7.1  /  Alb  4.2  /  TBili  0.4  /  DBili  x   /  AST  76<H>  /  ALT  144<H>  /  AlkPhos  68  10    PT/INR - ( 08 Oct 2019 23:24 )   PT: 11.4 sec;   INR: 1.01          PTT - ( 08 Oct 2019 23:24 )  PTT:39.4 sec  CARDIAC MARKERS ( 08 Oct 2019 23:23 )  x     / <0.01 ng/mL / 56 U/L / x     / 2.0 ng/mL      Urinalysis Basic - ( 09 Oct 2019 04:46 )    Color: Yellow / Appearance: Hazy / S.010 / pH: x  Gluc: x / Ketone: NEGATIVE  / Bili: Negative / Urobili: 0.2 E.U./dL   Blood: x / Protein: NEGATIVE mg/dL / Nitrite: NEGATIVE   Leuk Esterase: Moderate / RBC: < 5 /HPF / WBC Many /HPF   Sq Epi: x / Non Sq Epi: 0-5 /HPF / Bacteria: Present /HPF    RADIOLOGY & ADDITIONAL STUDIES:    - CT head 10/9   < from: CT Head No Cont (10.09.19 @ 02:03) >  IMPRESSION:  No acute intracranial hemorrhage, transcortical infarct, or mass effect.    Ill-defined of hyperdensity in theleft middle cranial fossa likely   represents known cavernous meningioma.   Microangiopathic disease.    - CTA head/ neck 10/9    < from: CT Angio Head w/ IV Cont (10.09.19 @ 02:04) >  IMPRESSION:   There is significant narrowing and nonvisualization of the left ICA in   the region of the cavernous segment consistent with high-grade   stenosis/occlusion which is associated with enhancing extra-axial lesion   consistent with history of cavernous meningioma. Left ICA reconstitutes   more distally in the clinoid region.    There is non opacification of the sagittal sinus consistent with history   of venous sagittal sinus thrombosis.       ASSESSMENT/PLAN:  63year - old female with a PMHx of Asthma, Thyroid ca, Intracavernous tumor/meningioma, large sinus sagittal thrombosis (dx 2019), started on Lovenox 10/1 as per Dr. Felder  (followed by Dr. Tejeda) presented with worsening headache on 10/9/2019.  Neurology consulted as Dr. Felder has been following this patient as an outpatient. CTA head/neck 10/9 revealed non-opacification of the sagittal sinus consistent with a history of venous sagittal sinus thrombosis. Neuro exam is unremarkable, NIHSS 0.  Patient has been recently admitted for MRV of the brain and further workup..     -Continue Lovenox 65mg BID SQ   - Continue Diamox 250mg BID PO   - Awaiting MRV of brain   DVT ppx   - Lovenox BID (as above for anticoagulation)  -SCDs ~

## 2019-10-10 NOTE — PROGRESS NOTE ADULT - SUBJECTIVE AND OBJECTIVE BOX
HPI:  63F with a h/o asthma, thyroid cancer in the past, intracavernous tumor/meningioma, and recently dx large acute sinus sagittal thrombosis on Lovenox x 1 week (followed by Dr. Tejeda) who p/w worsening headache tonight. She has been experiencing HAs x 3 months, left supraorbital region, previous they were controlled y NSAIDs, but since she has been dx with thrombosis on lovenox she cannot take NSAIDs. She has been taking Tylenol and Fioricet but these are not really helping. Pt woke up two hours PTA with severe exacerbation of her headache on the left side. No vision or speech change, no n/v, no f/c, no neck pain, no trauma or fall.  Patient was placed on Lovenox by Dr. Felder.  she is being admitted to get a MRV of brain.  Patient states that she quit smoking x 1 week due to severe headache. She is not using nicotine patch. (09 Oct 2019 04:30)    10/10 HD#2 RAHUL overnight, persistent headache, started Diamox/SQL/Decadr as per [facility], MRV/A Nova head - penidng, Exam unchanged since yesterday    ICU Vital Signs Last 24 Hrs  T(C): 36.4 (09 Oct 2019 16:52), Max: 36.9 (08 Oct 2019 22:41)  T(F): 97.6 (09 Oct 2019 16:52), Max: 98.4 (08 Oct 2019 22:41)  HR: 53 (09 Oct 2019 16:52) (44 - 53)  BP: 127/65 (09 Oct 2019 16:52) (105/62 - 141/67)  BP(mean): --  ABP: --  ABP(mean): --  RR: 18 (09 Oct 2019 16:52) (17 - 18)  SpO2: 94% (09 Oct 2019 16:52) (94% - 98%)    Exam:  A&O x 3, PERRL, pupils are bilaterally constricted at 2mm. EOMI  No gaze preference. No fascial palsy.  No pronator drift  Lung: good air entry, clear lung sound.  Heart: S1S2 regular.  Abd: soft nn tender, +BS  Ext: No edema  No focal motor deficit. 5/5 x 4  no sensory deficit to touch.      A/P  63 female with known cavernous meningioma being treated for sagittal sinus trombosis with severe headache.  - Dr. Felder- Neuro/Stroke consult,   - MRV/A Nova head pending   - Cont SQl 65BID for sag sinus thromb as per Dr. Felder  - Cont Diamox as per Dr. Felder  - decadron taper over 3 days to off  - Cont Pain meds PRN,   - D/w Dr. Oro

## 2019-10-10 NOTE — CONSULT NOTE ADULT - SUBJECTIVE AND OBJECTIVE BOX
HPI:  63F with a h/o asthma, thyroid cancer in the past, intracavernous tumor/meningioma, and recently dx large acute sinus sagittal thrombosis on Lovenox x 1 week (followed by Dr. Tejeda) who p/w worsening headache tonight. She has been experiencing HAs x 3 months, left supraorbital region, previous they were controlled y NSAIDs, but since she has been dx with thrombosis on lovenox she cannot take NSAIDs. She has been taking Tylenol and Fioricet but these are not really helping. Pt woke up two hours PTA with severe exacerbation of her headache on the left side. No vision or speech change, no n/v, no f/c, no neck pain, no trauma or fall.  Patient was placed on Lovenox by Dr. Felder.  she is being admitted to get a MRV of brain.  Patient states that she quit smoking x 1 week due to severe headache. She is not using nicotine patch. (09 Oct 2019 04:30)      PAST MEDICAL & SURGICAL HISTORY:  Thyroid carcinoma  Asthma  Thrombosis, superior sagittal sinus  Brain tumor: meningioma  H/O thyroidectomy       Review of Systems:  · General	negative  · Skin/Breast	negative  · Ophthalmologic	negative  · ENMT	negative  · Respiratory and Thorax	negative  · Cardiovascular	negative  · Gastrointestinal	negative  · Genitourinary	negative  · Musculoskeletal Comments	negative  · Neurological	negative      MEDICATIONS  (STANDING):  acetazolamide    Tablet 250 milliGRAM(s) Oral every 12 hours  dexamethasone     Tablet 4 milliGRAM(s) Oral every 6 hours  dexamethasone     Tablet 2 milliGRAM(s) Oral every 6 hours  dexamethasone     Tablet   Oral   docusate sodium 100 milliGRAM(s) Oral three times a day  enoxaparin Injectable 65 milliGRAM(s) SubCutaneous every 12 hours  influenza   Vaccine 0.5 milliLiter(s) IntraMuscular once  levothyroxine 200 MICROGram(s) Oral daily    MEDICATIONS  (PRN):  acetaminophen   Tablet .. 325 milliGRAM(s) Oral every 4 hours PRN Moderate Pain (4 - 6)  acetaminophen 300 mG/butalbital 50 mG/ caffeine 40 mG 1 Capsule(s) Oral every 4 hours PRN headache  ondansetron Injectable 4 milliGRAM(s) IV Push every 6 hours PRN Nausea and/or Vomiting  traMADol 25 milliGRAM(s) Oral every 4 hours PRN Severe Pain (7 - 10)      Allergies    penicillin (Hives)    Intolerances        SOCIAL HISTORY:    FAMILY HISTORY:      Vital Signs Last 24 Hrs  T(C): 36.7 (10 Oct 2019 12:25), Max: 36.8 (09 Oct 2019 22:08)  T(F): 98.1 (10 Oct 2019 12:25), Max: 98.3 (09 Oct 2019 22:08)  HR: 48 (10 Oct 2019 12:25) (48 - 55)  BP: 110/65 (10 Oct 2019 12:25) (100/61 - 127/65)  BP(mean): --  RR: 17 (10 Oct 2019 12:25) (16 - 18)  SpO2: 96% (10 Oct 2019 12:25) (94% - 96%)     Physical Exam:  · Constitutional	detailed exam  · Constitutional Details	well-developed; well-groomed  · Eyes	EOMI; PERRL; no drainage or redness  · ENMT Comments	dry mucous membranes  · Respiratory	detailed exam  · Respiratory Comments	normal breath sounds at the lung bases bilaterally  · Cardiovascular	Regular rate & rhythm, normal S1, S2; no murmurs, gallops or rubs; no S3, S4  · Abd-Soft non tender  ·Ext-no edema, clubbing or cyanosis      LABS:                        13.9   11.65 )-----------( 763      ( 10 Oct 2019 05:50 )             45.5     10-10    140  |  107  |  12  ----------------------------<  140<H>  4.3   |  21<L>  |  0.94    Ca    9.1      10 Oct 2019 05:50    TPro  7.1  /  Alb  4.2  /  TBili  0.4  /  DBili  x   /  AST  76<H>  /  ALT  144<H>  /  AlkPhos  68  1008    PT/INR - ( 08 Oct 2019 23:24 )   PT: 11.4 sec;   INR: 1.01          PTT - ( 08 Oct 2019 23:24 )  PTT:39.4 sec  Urinalysis Basic - ( 09 Oct 2019 04:46 )    Color: Yellow / Appearance: Hazy / S.010 / pH: x  Gluc: x / Ketone: NEGATIVE  / Bili: Negative / Urobili: 0.2 E.U./dL   Blood: x / Protein: NEGATIVE mg/dL / Nitrite: NEGATIVE   Leuk Esterase: Moderate / RBC: < 5 /HPF / WBC Many /HPF   Sq Epi: x / Non Sq Epi: 0-5 /HPF / Bacteria: Present /HPF        RADIOLOGY & ADDITIONAL STUDIES:

## 2019-10-11 LAB
ANION GAP SERPL CALC-SCNC: 11 MMOL/L — SIGNIFICANT CHANGE UP (ref 5–17)
APTT BLD: 33.3 SEC — SIGNIFICANT CHANGE UP (ref 27.5–36.3)
APTT BLD: 42.7 SEC — HIGH (ref 27.5–36.3)
APTT BLD: 47.3 SEC — HIGH (ref 27.5–36.3)
APTT BLD: 75.9 SEC — HIGH (ref 27.5–36.3)
BLD GP AB SCN SERPL QL: NEGATIVE — SIGNIFICANT CHANGE UP
BUN SERPL-MCNC: 19 MG/DL — SIGNIFICANT CHANGE UP (ref 7–23)
CALCIUM SERPL-MCNC: 9.3 MG/DL — SIGNIFICANT CHANGE UP (ref 8.4–10.5)
CHLORIDE SERPL-SCNC: 107 MMOL/L — SIGNIFICANT CHANGE UP (ref 96–108)
CO2 SERPL-SCNC: 22 MMOL/L — SIGNIFICANT CHANGE UP (ref 22–31)
CREAT SERPL-MCNC: 0.92 MG/DL — SIGNIFICANT CHANGE UP (ref 0.5–1.3)
GLUCOSE SERPL-MCNC: 138 MG/DL — HIGH (ref 70–99)
HCT VFR BLD CALC: 41.7 % — SIGNIFICANT CHANGE UP (ref 34.5–45)
HCT VFR BLD CALC: 41.8 % — SIGNIFICANT CHANGE UP (ref 34.5–45)
HGB BLD-MCNC: 13.1 G/DL — SIGNIFICANT CHANGE UP (ref 11.5–15.5)
HGB BLD-MCNC: 13.3 G/DL — SIGNIFICANT CHANGE UP (ref 11.5–15.5)
INR BLD: 1 — SIGNIFICANT CHANGE UP (ref 0.88–1.16)
MAGNESIUM SERPL-MCNC: 1.9 MG/DL — SIGNIFICANT CHANGE UP (ref 1.6–2.6)
MCHC RBC-ENTMCNC: 28.6 PG — SIGNIFICANT CHANGE UP (ref 27–34)
MCHC RBC-ENTMCNC: 28.8 PG — SIGNIFICANT CHANGE UP (ref 27–34)
MCHC RBC-ENTMCNC: 31.4 GM/DL — LOW (ref 32–36)
MCHC RBC-ENTMCNC: 31.8 GM/DL — LOW (ref 32–36)
MCV RBC AUTO: 90.5 FL — SIGNIFICANT CHANGE UP (ref 80–100)
MCV RBC AUTO: 91 FL — SIGNIFICANT CHANGE UP (ref 80–100)
NRBC # BLD: 0 /100 WBCS — SIGNIFICANT CHANGE UP (ref 0–0)
NRBC # BLD: 0 /100 WBCS — SIGNIFICANT CHANGE UP (ref 0–0)
PHOSPHATE SERPL-MCNC: 3.7 MG/DL — SIGNIFICANT CHANGE UP (ref 2.5–4.5)
PLATELET # BLD AUTO: 721 K/UL — HIGH (ref 150–400)
PLATELET # BLD AUTO: 761 K/UL — HIGH (ref 150–400)
POTASSIUM SERPL-MCNC: 4.2 MMOL/L — SIGNIFICANT CHANGE UP (ref 3.5–5.3)
POTASSIUM SERPL-SCNC: 4.2 MMOL/L — SIGNIFICANT CHANGE UP (ref 3.5–5.3)
PROTHROM AB SERPL-ACNC: 11.3 SEC — SIGNIFICANT CHANGE UP (ref 10–12.9)
RBC # BLD: 4.58 M/UL — SIGNIFICANT CHANGE UP (ref 3.8–5.2)
RBC # BLD: 4.62 M/UL — SIGNIFICANT CHANGE UP (ref 3.8–5.2)
RBC # FLD: 13.2 % — SIGNIFICANT CHANGE UP (ref 10.3–14.5)
RBC # FLD: 13.2 % — SIGNIFICANT CHANGE UP (ref 10.3–14.5)
RH IG SCN BLD-IMP: POSITIVE — SIGNIFICANT CHANGE UP
SODIUM SERPL-SCNC: 140 MMOL/L — SIGNIFICANT CHANGE UP (ref 135–145)
WBC # BLD: 17.23 K/UL — HIGH (ref 3.8–10.5)
WBC # BLD: 17.77 K/UL — HIGH (ref 3.8–10.5)
WBC # FLD AUTO: 17.23 K/UL — HIGH (ref 3.8–10.5)
WBC # FLD AUTO: 17.77 K/UL — HIGH (ref 3.8–10.5)

## 2019-10-11 PROCEDURE — 99232 SBSQ HOSP IP/OBS MODERATE 35: CPT

## 2019-10-11 RX ORDER — HEPARIN SODIUM 5000 [USP'U]/ML
2000 INJECTION INTRAVENOUS; SUBCUTANEOUS ONCE
Refills: 0 | Status: COMPLETED | OUTPATIENT
Start: 2019-10-11 | End: 2019-10-11

## 2019-10-11 RX ORDER — HEPARIN SODIUM 5000 [USP'U]/ML
950 INJECTION INTRAVENOUS; SUBCUTANEOUS
Qty: 25000 | Refills: 0 | Status: DISCONTINUED | OUTPATIENT
Start: 2019-10-11 | End: 2019-10-12

## 2019-10-11 RX ORDER — METOCLOPRAMIDE HCL 10 MG
10 TABLET ORAL ONCE
Refills: 0 | Status: COMPLETED | OUTPATIENT
Start: 2019-10-11 | End: 2019-10-11

## 2019-10-11 RX ORDER — HEPARIN SODIUM 5000 [USP'U]/ML
2500 INJECTION INTRAVENOUS; SUBCUTANEOUS ONCE
Refills: 0 | Status: COMPLETED | OUTPATIENT
Start: 2019-10-11 | End: 2019-10-11

## 2019-10-11 RX ORDER — HEPARIN SODIUM 5000 [USP'U]/ML
850 INJECTION INTRAVENOUS; SUBCUTANEOUS
Qty: 25000 | Refills: 0 | Status: DISCONTINUED | OUTPATIENT
Start: 2019-10-11 | End: 2019-10-11

## 2019-10-11 RX ORDER — SODIUM CHLORIDE 9 MG/ML
1000 INJECTION INTRAMUSCULAR; INTRAVENOUS; SUBCUTANEOUS
Refills: 0 | Status: DISCONTINUED | OUTPATIENT
Start: 2019-10-11 | End: 2019-10-13

## 2019-10-11 RX ORDER — HEPARIN SODIUM 5000 [USP'U]/ML
750 INJECTION INTRAVENOUS; SUBCUTANEOUS
Qty: 25000 | Refills: 0 | Status: DISCONTINUED | OUTPATIENT
Start: 2019-10-11 | End: 2019-10-11

## 2019-10-11 RX ORDER — ASPIRIN/CALCIUM CARB/MAGNESIUM 324 MG
81 TABLET ORAL DAILY
Refills: 0 | Status: DISCONTINUED | OUTPATIENT
Start: 2019-10-11 | End: 2019-10-20

## 2019-10-11 RX ADMIN — Medication 10 MILLIGRAM(S): at 18:14

## 2019-10-11 RX ADMIN — HEPARIN SODIUM 2000 UNIT(S): 5000 INJECTION INTRAVENOUS; SUBCUTANEOUS at 04:53

## 2019-10-11 RX ADMIN — Medication 200 MICROGRAM(S): at 05:14

## 2019-10-11 RX ADMIN — ACETAZOLAMIDE 250 MILLIGRAM(S): 250 TABLET ORAL at 07:46

## 2019-10-11 RX ADMIN — Medication 6 MILLIGRAM(S): at 19:08

## 2019-10-11 RX ADMIN — Medication 100 MILLIGRAM(S): at 15:21

## 2019-10-11 RX ADMIN — ONDANSETRON 4 MILLIGRAM(S): 8 TABLET, FILM COATED ORAL at 12:27

## 2019-10-11 RX ADMIN — ACETAZOLAMIDE 250 MILLIGRAM(S): 250 TABLET ORAL at 19:08

## 2019-10-11 RX ADMIN — Medication 6 MILLIGRAM(S): at 01:22

## 2019-10-11 RX ADMIN — Medication 100 MILLIGRAM(S): at 05:14

## 2019-10-11 RX ADMIN — HEPARIN SODIUM 2500 UNIT(S): 5000 INJECTION INTRAVENOUS; SUBCUTANEOUS at 21:05

## 2019-10-11 RX ADMIN — HEPARIN SODIUM 9.5 UNIT(S)/HR: 5000 INJECTION INTRAVENOUS; SUBCUTANEOUS at 21:05

## 2019-10-11 RX ADMIN — SODIUM CHLORIDE 75 MILLILITER(S): 9 INJECTION INTRAMUSCULAR; INTRAVENOUS; SUBCUTANEOUS at 11:16

## 2019-10-11 RX ADMIN — Medication 100 MILLIGRAM(S): at 22:09

## 2019-10-11 RX ADMIN — HEPARIN SODIUM 7.5 UNIT(S)/HR: 5000 INJECTION INTRAVENOUS; SUBCUTANEOUS at 04:53

## 2019-10-11 RX ADMIN — Medication 6 MILLIGRAM(S): at 12:15

## 2019-10-11 RX ADMIN — Medication 81 MILLIGRAM(S): at 15:21

## 2019-10-11 RX ADMIN — HYDROMORPHONE HYDROCHLORIDE 30 MILLILITER(S): 2 INJECTION INTRAMUSCULAR; INTRAVENOUS; SUBCUTANEOUS at 19:09

## 2019-10-11 RX ADMIN — Medication 6 MILLIGRAM(S): at 07:51

## 2019-10-11 NOTE — PROGRESS NOTE ADULT - ASSESSMENT
63/F with intracavernous tumopr/meningioma and recent diagnosis of acute sinus thrombosis on lovenox for 1 week, now admitted with worse headaches.  HAd headaches for 3 months.  Quit smoking 1 week ago. started on diamox this hospitalization.      Neuro)   Neurochecks q1h  -defer repeat imaging until likely angiogram monday, d/w dr silver  -start ASA 81  -heparin drip full anticoagulation goal (ptt 58-99)  -pain control: dilaudid PCA, trial reglan 10mg x1    Heme) 1) c/f essential thrombocytosis  -pending heme workup for ET and other hypergoaguable states, appreiate reccs  -DVT ppx: on full dose AC    GI: PO as tolerated  -bowel regimen    Renal: IVF's  -NS at 150/hr for good hydration    Access: PIV's 63/F with hx of intracavernous tumor/meningioma in the past and recent diagnosis of 3 months of headaches found to have acute sinus thrombosis, now worsening on lovenox and tansitioned to heparin gtt.  Neurologically intact only with headaches, but brought to ICU for closer neurologic monitoring.   Endorses significant nausea and vomiting after starting PCA, however it did help her HA significantly.    Plan  Neuro  Neurochecks q1h  -defer repeat imaging until likely angiogram monday, d/w dr silver  -start ASA 81  -heparin drip full anticoagulation goal (ptt 58-99)  -pain control: dilaudid PCA, trial reglan 10mg x1    Heme) 1) c/f essential thrombocytosis  -pending heme laboratory workup for ET and other hypergoaguable states, appreiate reccs  -DVT ppx: on full dose AC  -q6h PTT    GI: PO as tolerated  -bowel regimen    Renal: IVF's  -NS at 150/hr for good hydration    Access: PIV's    Sylvester Car MD  Attending Neurointensivist

## 2019-10-11 NOTE — DIETITIAN INITIAL EVALUATION ADULT. - ENERGY NEEDS
Height 63"; #; #; 124%IBW  BMI 25.4  Ideal body weight used for calculations as pt >120% of IBW. Needs estimated for maintenance in adults

## 2019-10-11 NOTE — PROGRESS NOTE ADULT - SUBJECTIVE AND OBJECTIVE BOX
*********INCOMPLETE NOTE ************  Neurology Stroke Progress Note    INTERVAL HPI/OVERNIGHT EVENTS:  Patient seen and examined this morning in the ICU. Patient was found to have worsening venous sinus thrombosis and worsening headache, transferred to the ICU. Patient now with PCA for headache control and on heparin drip. States that she is pushing PCA button every 30 minutes and her headache is well controlled at a 2/10. Patient denies numbness, weakness, tingling, change in vision or speech, dizziness.     MEDICATIONS  (STANDING):  acetazolamide    Tablet 250 milliGRAM(s) Oral every 12 hours  aspirin enteric coated 81 milliGRAM(s) Oral daily  dexamethasone  Injectable 6 milliGRAM(s) IV Push every 6 hours  docusate sodium 100 milliGRAM(s) Oral three times a day  heparin  Infusion 850 Unit(s)/Hr (8.5 mL/Hr) IV Continuous <Continuous>  HYDROmorphone PCA (1 mG/mL) 30 milliLiter(s) PCA Continuous PCA Continuous  influenza   Vaccine 0.5 milliLiter(s) IntraMuscular once  levothyroxine 200 MICROGram(s) Oral daily  metoclopramide Injectable 10 milliGRAM(s) IV Push once  sodium chloride 0.9%. 1000 milliLiter(s) (150 mL/Hr) IV Continuous <Continuous>    MEDICATIONS  (PRN):  acetaminophen   Tablet .. 325 milliGRAM(s) Oral every 4 hours PRN Moderate Pain (4 - 6)  acetaminophen 300 mG/butalbital 50 mG/ caffeine 40 mG 1 Capsule(s) Oral every 4 hours PRN headache  ondansetron Injectable 4 milliGRAM(s) IV Push every 6 hours PRN Nausea and/or Vomiting  traMADol 25 milliGRAM(s) Oral every 4 hours PRN Severe Pain (7 - 10)      Allergies    penicillin (Hives)    Intolerances          Vital Signs Last 24 Hrs  T(C): 36.4 (11 Oct 2019 13:54), Max: 37 (11 Oct 2019 04:03)  T(F): 97.5 (11 Oct 2019 13:54), Max: 98.6 (11 Oct 2019 04:03)  HR: 46 (11 Oct 2019 13:00) (42 - 62)  BP: 114/55 (11 Oct 2019 13:00) (96/56 - 137/79)  BP(mean): 76 (11 Oct 2019 13:00) (61 - 102)  RR: 24 (11 Oct 2019 13:00) (0 - 30)  SpO2: 94% (11 Oct 2019 13:00) (92% - 98%)    Physical exam:  General: No acute distress, awake and alert  Neurologic:  -Mental status: Awake, alert, oriented to person, place, and time. Speech is fluent with intact naming, repetition, and comprehension, no dysarthria. Recent and remote memory intact. Follows commands. Attention/concentration intact. Fund of knowledge appropriate.  -Cranial nerves:   II: Visual fields are full to finger counting.  III, IV, VI: Extraocular movements are intact without nystagmus. Pupils equally round and reactive to light  V:  Facial sensation V1-V3 equal and intact   VII: Face is symmetric with normal eye closure and smile  XI: Head turning and shoulder shrug are intact.  XII: Tongue protrudes midline  Motor: Normal bulk and tone. No pronator drift. Strength bilateral upper extremity 5/5, bilateral lower extremities 5/5.  Sensation: Intact to light touch bilaterally.   Coordination: No dysmetria on finger-to-nose   Reflexes: Downgoing toes bilaterally       LABS:                        13.1   17.23 )-----------( 761      ( 11 Oct 2019 05:44 )             41.7     10-11    140  |  107  |  19  ----------------------------<  138<H>  4.2   |  22  |  0.92    Ca    9.3      11 Oct 2019 05:44  Phos  3.7     10-11  Mg     1.9     10-11      PT/INR - ( 11 Oct 2019 05:44 )   PT: 11.3 sec;   INR: 1.00          PTT - ( 11 Oct 2019 11:57 )  PTT:47.3 sec      RADIOLOGY & ADDITIONAL TESTS:  < from: MR Venogram Head No Cont (10.10.19 @ 22:53) >  Severe narrowing and irregularity of the right sigmoid sinus and   transverse sinus. Severe narrowing and irregularity of the sagittal   sinus. Findings are concerning for venous thrombosis    < end of copied text >  < from: MR Venogram Head No Cont (10.10.19 @ 22:53) >  No acute intracranial abnormality.  Mild small vessel ischemic changes.  Meningioma seen in the cavernous sinus on the previous study is not seen   on this noncontrast study. Sinus disease.    < from: MR Venogram Head No Cont (10.10.19 @ 22:53) >    IMPRESSION: Severe narrowing of the visualized intracranial cervical   internal carotid artery with area of occlusion in the mid segment. Severe   narrowing of the proximal and mid petrous segment with occluded distal   petrous and cavernous segment with reconstitution of the supraclinoid   segment.  Persistent fetal origin of the right posterior cerebral artery.  Hypoplastic left A1 segment. Mild narrowing of the left A2 segment. Left   M1 segment is unremarkable. Mild narrowing of the left M2 and M3 segments   compared to the right. No evidence of restricted diffusion to suggest an   acute infarct. Previously seen cavernous sinus meningioma is difficult to   evaluate on this noncontrast study. No midline shift. No hemorrhage.      < from: MR Venogram Head No Cont (10.10.19 @ 22:53) >    Limited evaluation of the brain demonstrates the ventricles, cisternal   spaces and cortical sulci to be appropriate for the patient's stated   age.The FLAIR images demonstrate a few scatteredpunctate foci of   increased signal within the periventricular and subcortical white matter   which is nonspecific and most likely represents the sequela of small   vessel ischemic disease in this patient. The diffusion-weighted images   demonstrate no acute ischemia.    There is a isointense soft tissue mass involving the left cavernous   sinuses with extension along the left middle cranial fossa with narrowing   of the left internal carotid artery flow void. There is hyperintense   signal within the left internal carotid flow-void. These findings are   compatible with the patient's underlying cavernous sinuses meningioma.    Limited evaluation of the orbits demonstrates asymmetric prominence of   the right superior ophthalmic vein.    Assessment and Plan  63 year old female with a PMHx of Asthma, Thyroid ca, Intracavernous tumor/meningioma, large sinus sagittal thrombosis (dx 9/11/2019), started on Lovenox 10/1 and presented with worsening headache on 10/9/2019.  MRV with worsening of sagittal sinus thrombosis and right sigmoid sinus and transverse sinus. Patient was converted to heparin gtt. Hematology involved with recommendation to start asa 81mg in addition to heparin drip.     - start asa 81mg daily  - Continue heparin gtt, goal PPT  - Continue Diamox 250mg BID PO   - SCDs for dvt prophylaxis, chemoprophylaxis as above with hep gtt  - inpatient managment per [facility]    Guera Krishnan  Neurology Stroke NP  [phone] Neurology Stroke Progress Note    INTERVAL HPI/OVERNIGHT EVENTS:  Patient seen and examined this morning in the ICU. Patient was found to have worsening venous sinus thrombosis and worsening headache, transferred to the ICU. Patient now with PCA for headache control and on heparin drip. States that she is pushing PCA button every 30 minutes and her headache is well controlled at a 2/10. Patient denies numbness, weakness, tingling, change in vision or speech, dizziness.     MEDICATIONS  (STANDING):  acetazolamide    Tablet 250 milliGRAM(s) Oral every 12 hours  aspirin enteric coated 81 milliGRAM(s) Oral daily  dexamethasone  Injectable 6 milliGRAM(s) IV Push every 6 hours  docusate sodium 100 milliGRAM(s) Oral three times a day  heparin  Infusion 850 Unit(s)/Hr (8.5 mL/Hr) IV Continuous <Continuous>  HYDROmorphone PCA (1 mG/mL) 30 milliLiter(s) PCA Continuous PCA Continuous  influenza   Vaccine 0.5 milliLiter(s) IntraMuscular once  levothyroxine 200 MICROGram(s) Oral daily  metoclopramide Injectable 10 milliGRAM(s) IV Push once  sodium chloride 0.9%. 1000 milliLiter(s) (150 mL/Hr) IV Continuous <Continuous>    MEDICATIONS  (PRN):  acetaminophen   Tablet .. 325 milliGRAM(s) Oral every 4 hours PRN Moderate Pain (4 - 6)  acetaminophen 300 mG/butalbital 50 mG/ caffeine 40 mG 1 Capsule(s) Oral every 4 hours PRN headache  ondansetron Injectable 4 milliGRAM(s) IV Push every 6 hours PRN Nausea and/or Vomiting  traMADol 25 milliGRAM(s) Oral every 4 hours PRN Severe Pain (7 - 10)      Allergies    penicillin (Hives)    Intolerances          Vital Signs Last 24 Hrs  T(C): 36.4 (11 Oct 2019 13:54), Max: 37 (11 Oct 2019 04:03)  T(F): 97.5 (11 Oct 2019 13:54), Max: 98.6 (11 Oct 2019 04:03)  HR: 46 (11 Oct 2019 13:00) (42 - 62)  BP: 114/55 (11 Oct 2019 13:00) (96/56 - 137/79)  BP(mean): 76 (11 Oct 2019 13:00) (61 - 102)  RR: 24 (11 Oct 2019 13:00) (0 - 30)  SpO2: 94% (11 Oct 2019 13:00) (92% - 98%)    Physical exam:  General: No acute distress, awake and alert  Neurologic:  -Mental status: Awake, alert, oriented to person, place, and time. Speech is fluent with intact naming, repetition, and comprehension, no dysarthria. Recent and remote memory intact. Follows commands. Attention/concentration intact. Fund of knowledge appropriate.  -Cranial nerves:   II: Visual fields are full to finger counting.  III, IV, VI: Extraocular movements are intact without nystagmus. Pupils equally round and reactive to light  V:  Facial sensation V1-V3 equal and intact   VII: Face is symmetric with normal eye closure and smile  XI: Head turning and shoulder shrug are intact.  XII: Tongue protrudes midline  Motor: Normal bulk and tone. No pronator drift. Strength bilateral upper extremity 5/5, bilateral lower extremities 5/5.  Sensation: Intact to light touch bilaterally.   Coordination: No dysmetria on finger-to-nose   Reflexes: Downgoing toes bilaterally       LABS:                        13.1   17.23 )-----------( 761      ( 11 Oct 2019 05:44 )             41.7     10-11    140  |  107  |  19  ----------------------------<  138<H>  4.2   |  22  |  0.92    Ca    9.3      11 Oct 2019 05:44  Phos  3.7     10-11  Mg     1.9     10-11      PT/INR - ( 11 Oct 2019 05:44 )   PT: 11.3 sec;   INR: 1.00          PTT - ( 11 Oct 2019 11:57 )  PTT:47.3 sec      RADIOLOGY & ADDITIONAL TESTS:  < from: MR Venogram Head No Cont (10.10.19 @ 22:53) >  Severe narrowing and irregularity of the right sigmoid sinus and   transverse sinus. Severe narrowing and irregularity of the sagittal   sinus. Findings are concerning for venous thrombosis    < end of copied text >  < from: MR Venogram Head No Cont (10.10.19 @ 22:53) >  No acute intracranial abnormality.  Mild small vessel ischemic changes.  Meningioma seen in the cavernous sinus on the previous study is not seen   on this noncontrast study. Sinus disease.    < from: MR Venogram Head No Cont (10.10.19 @ 22:53) >    IMPRESSION: Severe narrowing of the visualized intracranial cervical   internal carotid artery with area of occlusion in the mid segment. Severe   narrowing of the proximal and mid petrous segment with occluded distal   petrous and cavernous segment with reconstitution of the supraclinoid   segment.  Persistent fetal origin of the right posterior cerebral artery.  Hypoplastic left A1 segment. Mild narrowing of the left A2 segment. Left   M1 segment is unremarkable. Mild narrowing of the left M2 and M3 segments   compared to the right. No evidence of restricted diffusion to suggest an   acute infarct. Previously seen cavernous sinus meningioma is difficult to   evaluate on this noncontrast study. No midline shift. No hemorrhage.      < from: MR Venogram Head No Cont (10.10.19 @ 22:53) >    Limited evaluation of the brain demonstrates the ventricles, cisternal   spaces and cortical sulci to be appropriate for the patient's stated   age.The FLAIR images demonstrate a few scatteredpunctate foci of   increased signal within the periventricular and subcortical white matter   which is nonspecific and most likely represents the sequela of small   vessel ischemic disease in this patient. The diffusion-weighted images   demonstrate no acute ischemia.    There is a isointense soft tissue mass involving the left cavernous   sinuses with extension along the left middle cranial fossa with narrowing   of the left internal carotid artery flow void. There is hyperintense   signal within the left internal carotid flow-void. These findings are   compatible with the patient's underlying cavernous sinuses meningioma.    Limited evaluation of the orbits demonstrates asymmetric prominence of   the right superior ophthalmic vein.    Assessment and Plan  63 year old female with a PMHx of Asthma, Thyroid ca, Intracavernous tumor/meningioma, large sinus sagittal thrombosis (dx 9/11/2019), started on Lovenox 10/1 and presented with worsening headache on 10/9/2019.  MRV with worsening of sagittal sinus thrombosis and right sigmoid sinus and transverse sinus. Patient was converted to heparin gtt. Hematology involved with recommendation to start asa 81mg in addition to heparin drip.     - start asa 81mg daily  - Continue heparin gtt, goal PPT  - Continue Diamox 250mg BID PO   - SCDs for dvt prophylaxis, chemoprophylaxis as above with hep gtt  - possible angiogram Monday  - inpatient managment per [facility]    Guera Krishnan  Neurology Stroke NP  [phone]

## 2019-10-11 NOTE — DIETITIAN INITIAL EVALUATION ADULT. - ADD RECOMMEND
1. Please resume Regular diet as medically feasible 2. Pain and bowel regimens per team 3. Monitor lytes and replete prn.

## 2019-10-11 NOTE — PROGRESS NOTE ADULT - SUBJECTIVE AND OBJECTIVE BOX
***Neurocritical care attending note***    63/F with intracavernous tumopr/meningioma and recent diagnosis of acute sinus thrombosis on lovenox for 1 week, now admitted with worse headaches.  HAd headaches for 3 months.  Quit smoking 1 week ago. started on diamox this hospitalization.        PMH: hx thyroid cancer, asthma    Allergies: penicillin (Hives)          VITALS:  T(C): 36.5 (10-11-19 @ 09:17), Max: 37 (10-11-19 @ 04:03)  HR: 46 (10-11-19 @ 11:00) (42 - 62)  BP: 117/55 (10-11-19 @ 11:00) (96/56 - 137/79)  RR: 20 (10-11-19 @ 11:00) (0 - 30)  SpO2: 93% (10-11-19 @ 11:00) (92% - 98%)    EXAM:        MEDICATIONS:  acetaminophen   Tablet .. 325 milliGRAM(s) Oral every 4 hours PRN  acetaminophen 300 mG/butalbital 50 mG/ caffeine 40 mG 1 Capsule(s) Oral every 4 hours PRN  acetazolamide    Tablet 250 milliGRAM(s) Oral every 12 hours  aspirin enteric coated 81 milliGRAM(s) Oral daily  dexamethasone  Injectable 6 milliGRAM(s) IV Push every 6 hours  docusate sodium 100 milliGRAM(s) Oral three times a day  heparin  Infusion 850 Unit(s)/Hr IV Continuous <Continuous>  HYDROmorphone PCA (1 mG/mL) 30 milliLiter(s) PCA Continuous PCA Continuous  influenza   Vaccine 0.5 milliLiter(s) IntraMuscular once  levothyroxine 200 MICROGram(s) Oral daily  ondansetron Injectable 4 milliGRAM(s) IV Push every 6 hours PRN  sodium chloride 0.9%. 1000 milliLiter(s) IV Continuous <Continuous>  traMADol 25 milliGRAM(s) Oral every 4 hours PRN      I/O's    10-10-19 @ 07:01  -  10-11-19 @ 07:00  --------------------------------------------------------  IN: 1610 mL / OUT: 1150 mL / NET: 460 mL    10-11-19 @ 07:01  -  10-11-19 @ 12:32  --------------------------------------------------------  IN: 412.5 mL / OUT: 0 mL / NET: 412.5 mL      LABS:                          13.1   17.23 )-----------( 761      ( 11 Oct 2019 05:44 )             41.7     10-11    140  |  107  |  19  ----------------------------<  138<H>  4.2   |  22  |  0.92    Ca    9.3      11 Oct 2019 05:44  Phos  3.7     10-11  Mg     1.9     10-11      PT/INR - ( 11 Oct 2019 05:44 )   PT: 11.3 sec;   INR: 1.00          PTT - ( 11 Oct 2019 11:57 )  PTT:47.3 sec        CAPILLARY BLOOD GLUCOSE                MRA/MRV 10/9/19 ***Neurocritical care attending note***    63/F with intracavernous tumopr/meningioma and recent diagnosis of 3 months of headaches found to have acute sinus thrombosis put on lovenox for 1 week as outpatient, now admitted with worse headaches.  Quit smoking 1 week ago. started on diamox this hospitalization.  MRV read yesterday appeared to show worsening clot burden, transitioned to heparin drip as a more established anticoagulant for this type of thrombosis.  Neurologically doing well, but brought to ICU for closer neurologic monitoring.   Endorses signifciant nausea and vomiting after starting PCA, however it did help her HA significantly.      PMH: hx thyroid cancer, asthma    Allergies: penicillin (Hives)          VITALS:  T(C): 36.5 (10-11-19 @ 09:17), Max: 37 (10-11-19 @ 04:03)  HR: 46 (10-11-19 @ 11:00) (42 - 62)  BP: 117/55 (10-11-19 @ 11:00) (96/56 - 137/79)  RR: 20 (10-11-19 @ 11:00) (0 - 30)  SpO2: 93% (10-11-19 @ 11:00) (92% - 98%)    MEDICATIONS:  acetaminophen   Tablet .. 325 milliGRAM(s) Oral every 4 hours PRN  acetaminophen 300 mG/butalbital 50 mG/ caffeine 40 mG 1 Capsule(s) Oral every 4 hours PRN  acetazolamide    Tablet 250 milliGRAM(s) Oral every 12 hours  aspirin enteric coated 81 milliGRAM(s) Oral daily  dexamethasone  Injectable 6 milliGRAM(s) IV Push every 6 hours  docusate sodium 100 milliGRAM(s) Oral three times a day  heparin  Infusion 850 Unit(s)/Hr IV Continuous <Continuous>  HYDROmorphone PCA (1 mG/mL) 30 milliLiter(s) PCA Continuous PCA Continuous  influenza   Vaccine 0.5 milliLiter(s) IntraMuscular once  levothyroxine 200 MICROGram(s) Oral daily  ondansetron Injectable 4 milliGRAM(s) IV Push every 6 hours PRN  sodium chloride 0.9%. 1000 milliLiter(s) IV Continuous <Continuous>  traMADol 25 milliGRAM(s) Oral every 4 hours PRN      I/O's    10-10-19 @ 07:01  -  10-11-19 @ 07:00  --------------------------------------------------------  IN: 1610 mL / OUT: 1150 mL / NET: 460 mL    10-11-19 @ 07:01  -  10-11-19 @ 12:32  --------------------------------------------------------  IN: 412.5 mL / OUT: 0 mL / NET: 412.5 mL      LABS:                          13.1   17.23 )-----------( 761      ( 11 Oct 2019 05:44 )             41.7     10-11    140  |  107  |  19  ----------------------------<  138<H>  4.2   |  22  |  0.92    Ca    9.3      11 Oct 2019 05:44  Phos  3.7     10-11  Mg     1.9     10-11      PT/INR - ( 11 Oct 2019 05:44 )   PT: 11.3 sec;   INR: 1.00          PTT - ( 11 Oct 2019 11:57 )  PTT:47.3 sec        CAPILLARY BLOOD GLUCOSE                MRA/MRV 10/9/19    EXAM:  MR VENOGRAM BRAIN                          EXAM:  MR ANGIO BRAIN                          PROCEDURE DATE:  10/10/2019          INTERPRETATION:  ICaroline MD, have reviewed the images and the   report and agree with the findings, with the following modification:     TECHNIQUE: The MRA was performed utilizing 3 D TOF technique.   Intravascular flow quantification was performed on the intracranial   vessels using gated 2-D phase contrast technique as part of Non-invasive   Optimal Vessel analysis (NOVA). The MRV was performed utilizing 2D TOF   technique. Intravascular flow quantification was performed on the   intracranial vessels using gated 2-D phase contrast technique as part of   Non-invasive Optimal Vessel analysis (NOVA).In addition, FLAIR and   diffusion weighted images of the brain were obtained.    The MRA examination demonstrates a attenuated caliber and signal within   the distal left internal carotid artery extending to the skull base.   There is poor visualization of signal within the cavernous and clinoid   segments which may be secondary to occlusion/severe stenosis. Signal is   identified within the supraclinoid segment extending to the left carotid   terminus. There is a small left A1 segment which may be secondary to   underdevelopment. There is a somewhat fusiform appearance to the distal   left A1 segment and proximal A2 segment (series 203 image 30). The distal   left A2 segment is otherwise intact. The anterior communicating artery is   patent. The left middle cerebral artery appears intact. The right   vertebral artery is underdeveloped. There is a normal bifurcation into   the posterior cerebral arteries bilaterally. The right P1 segment is   nearly aplastic with a prominent right posterior communicating artery   supplying the right PCA territory. The left posterior communicating   artery is patent.    The MRV examination demonstrates lack of visualization of signal within   the proximal portion of the superior sagittal sinus. There is an   attenuated caliber with irregular signal within the rest of the superior   sagittal sinus extending to its proximal one third. Normal signal is   identified within the anterior one third of the of the superior sagittal   sinus. There is poor irregular signal identified within the right   transverse sinus extending to the right jugular bulb bulb. The left   transverse sinus and jugular vein is dominant.    NOVA analysis demonstrates:    MARKUS        291  ml/min                 LICA          19    ml/min  RACA      168                               LACA        -31         RACA2      55                               LACA2       51  RMCA      105                               LMCA        113         RPCOM     54                               LPCOM      35                  BA           160      ml/min  RVA           89                LVA           135              RPCA        56                LPCA         56             RJV               112  ml/min       LJV                 283  ml/min  R sigmoid        99                    L sigmoid       368       R transverse    17                    L transverse   227    VOG                33  Straight         172                                                  SSS                18                                          ISS                  27    Limited evaluation of the brain demonstrates the ventricles, cisternal   spaces and cortical sulci to be appropriate for the patient's stated   age.The FLAIR images demonstrate a few scattered punctate foci of   increased signal within the periventricular and subcortical white matter   which is nonspecific and most likely represents the sequela of small   vessel ischemic disease in this patient. The diffusion-weighted images   demonstrate no acute ischemia.    There is a isointense soft tissue mass involving the left cavernous   sinuses with extension along the left middle cranial fossa with narrowing   of the left internal carotid artery flow void. There is hyperintense   signal within the left internal carotid flow-void. These findings are   compatible with the patient's underlying cavernous sinuses meningioma.    Limited evaluation of the orbits demonstrates asymmetric prominence of   the right superior ophthalmic vein.    ******PRELIMINARY REPORT******     MRA HEAD    IMPRESSION: Severe narrowing of the visualized intracranial cervical   internal carotid artery with area of occlusion in the mid segment. Severe   narrowing of the proximal and mid petrous segment with occluded distal   petrous and cavernous segment with reconstitution of the supraclinoid   segment.  Persistent fetal origin of the right posterior cerebral artery.  Hypoplastic left A1 segment. Mild narrowing of the left A2 segment. Left   M1 segment is unremarkable. Mild narrowing of the left M2 and M3 segments   compared to the right. No evidence of restricted diffusion to suggest an   acute infarct. Previously seen cavernous sinus meningioma is difficult to   evaluate on this noncontrast study. No midline shift. No hemorrhage.    MR HEAD  IMPRESSION:  No acute intracranial abnormality.  Mild small vessel ischemic changes.  Meningioma seen in the cavernous sinus on the previous study is not seen   on this noncontrast study. Sinus disease.      ________________________________    PROCEDURE INFORMATION:  Exam: MR venogram Without and With Contrast    Exam date and time: 10/10/2019 8:01 PM  FINDINGS:  Severe narrowing and irregularity of the right sigmoid sinus and   transverse sinus. Severe narrowing  and irregularity of the sagittal sinus.  Right internal jugular vein is smaller in caliber compared to the left.    IMPRESSION:  Severe narrowing and irregularity of the right sigmoid sinus and   transverse sinus. Severe narrowing and irregularity of the sagittal   sinus. Findings are concerning for venous thrombosis

## 2019-10-11 NOTE — DIETITIAN INITIAL EVALUATION ADULT. - NAME AND PHONE
Samantha Gitlin, RD, CDN, [facility], [id number] or available on [employer]

## 2019-10-11 NOTE — PROGRESS NOTE ADULT - SUBJECTIVE AND OBJECTIVE BOX
HEALTH ISSUES - PROBLEM Dx:  Thrombocytosis: Thrombocytosis  Thyroid carcinoma: Thyroid carcinoma  Brain tumor: Brain tumor  Thrombosis, superior sagittal sinus: Thrombosis, superior sagittal sinus          CHEMOTHERAPY REGIMEN:        Day:                          Diet:  Protocol:                                    IVF:      MEDICATIONS  (STANDING):  acetazolamide    Tablet 250 milliGRAM(s) Oral every 12 hours  aspirin enteric coated 81 milliGRAM(s) Oral daily  dexamethasone  Injectable 6 milliGRAM(s) IV Push every 6 hours  docusate sodium 100 milliGRAM(s) Oral three times a day  heparin  Infusion 850 Unit(s)/Hr (8.5 mL/Hr) IV Continuous <Continuous>  HYDROmorphone PCA (1 mG/mL) 30 milliLiter(s) PCA Continuous PCA Continuous  influenza   Vaccine 0.5 milliLiter(s) IntraMuscular once  levothyroxine 200 MICROGram(s) Oral daily  sodium chloride 0.9%. 1000 milliLiter(s) (75 mL/Hr) IV Continuous <Continuous>    MEDICATIONS  (PRN):  acetaminophen   Tablet .. 325 milliGRAM(s) Oral every 4 hours PRN Moderate Pain (4 - 6)  acetaminophen 300 mG/butalbital 50 mG/ caffeine 40 mG 1 Capsule(s) Oral every 4 hours PRN headache  ondansetron Injectable 4 milliGRAM(s) IV Push every 6 hours PRN Nausea and/or Vomiting  traMADol 25 milliGRAM(s) Oral every 4 hours PRN Severe Pain (7 - 10)      Allergies    penicillin (Hives)    Intolerances        DVT Prophylaxis: [ ] YES [ ] NO      Antibiotics: [ ] YES [ ] NO    Pain Scale (1-10):       Location:    Vital Signs Last 24 Hrs  T(C): 36.5 (11 Oct 2019 09:17), Max: 37 (11 Oct 2019 04:03)  T(F): 97.7 (11 Oct 2019 09:17), Max: 98.6 (11 Oct 2019 04:03)  HR: 46 (11 Oct 2019 11:00) (42 - 62)  BP: 117/55 (11 Oct 2019 11:00) (96/56 - 137/79)  BP(mean): 78 (11 Oct 2019 11:00) (61 - 102)  RR: 20 (11 Oct 2019 11:00) (0 - 30)  SpO2: 93% (11 Oct 2019 11:00) (92% - 98%)    Drug Dosing Weight  Height (cm): 160.02 (08 Oct 2019 22:41)  Weight (kg): 64.9 (08 Oct 2019 22:41)  BMI (kg/m2): 25.3 (08 Oct 2019 22:41)  BSA (m2): 1.68 (08 Oct 2019 22:41)     Physical Exam:  · Constitutional	detailed exam  · Constitutional Details	well-developed; well-groomed  · Eyes	EOMI; PERRL; no drainage or redness  · ENMT Comments	dry mucous membranes  · Respiratory	detailed exam  · Respiratory Comments	normal breath sounds at the lung bases bilaterally  · Cardiovascular	Regular rate & rhythm, normal S1, S2; no murmurs, gallops or rubs; no S3, S4  · Abd-Soft non tender  ·Ext-no edema, clubbing or cyanosis    URINARY CATHETER: [ ] YES [ ] NO     LABS:  CBC Full  -  ( 11 Oct 2019 05:44 )  WBC Count : 17.23 K/uL  RBC Count : 4.58 M/uL  Hemoglobin : 13.1 g/dL  Hematocrit : 41.7 %  Platelet Count - Automated : 761 K/uL  Mean Cell Volume : 91.0 fl  Mean Cell Hemoglobin : 28.6 pg  Mean Cell Hemoglobin Concentration : 31.4 gm/dL  Auto Neutrophil # : x  Auto Lymphocyte # : x  Auto Monocyte # : x  Auto Eosinophil # : x  Auto Basophil # : x  Auto Neutrophil % : x  Auto Lymphocyte % : x  Auto Monocyte % : x  Auto Eosinophil % : x  Auto Basophil % : x    10-11    140  |  107  |  19  ----------------------------<  138<H>  4.2   |  22  |  0.92    Ca    9.3      11 Oct 2019 05:44  Phos  3.7     10-11  Mg     1.9     10-11      PT/INR - ( 11 Oct 2019 05:44 )   PT: 11.3 sec;   INR: 1.00          PTT - ( 11 Oct 2019 11:57 )  PTT:47.3 sec      CULTURES:    RADIOLOGY & ADDITIONAL STUDIES:

## 2019-10-11 NOTE — PROGRESS NOTE ADULT - SUBJECTIVE AND OBJECTIVE BOX
HPI:  63F with a h/o asthma, thyroid cancer in the past, intracavernous tumor/meningioma, and recently dx large acute sinus sagittal thrombosis on Lovenox x 1 week (followed by Dr. Tejeda) who p/w worsening headache tonight. She has been experiencing HAs x 3 months, left supraorbital region, previous they were controlled y NSAIDs, but since she has been dx with thrombosis on lovenox she cannot take NSAIDs. She has been taking Tylenol and Fioricet but these are not really helping. Pt woke up two hours PTA with severe exacerbation of her headache on the left side. No vision or speech change, no n/v, no f/c, no neck pain, no trauma or fall.  Patient was placed on Lovenox by Dr. Felder.  she is being admitted to get a MRV of brain.  Patient states that she quit smoking x 1 week due to severe headache. She is not using nicotine patch. (09 Oct 2019 04:30)    10/10 HD#2 RAHUL overnight, persistent headache, started Diamox/SQL/Decadr as per [facility], MRV/A Nova head - penidng, Exam unchanged since yesterday  10/11 HD#3 Imaging suggestive of progressive thrombosis, headaches continue.  Neuro exam intact.    ICU Vital Signs Last 24 Hrs  T(C): 36.4 (10 Oct 2019 22:10), Max: 36.7 (10 Oct 2019 12:25)  T(F): 97.5 (10 Oct 2019 22:10), Max: 98.1 (10 Oct 2019 12:25)  HR: 52 (10 Oct 2019 22:04) (48 - 55)  BP: 137/79 (10 Oct 2019 22:04) (100/61 - 137/79)  BP(mean): 102 (10 Oct 2019 22:04) (102 - 102)  ABP: --  ABP(mean): --  RR: 18 (10 Oct 2019 22:04) (17 - 18)  SpO2: 97% (10 Oct 2019 22:04) (94% - 98%)    I&O's Summary    09 Oct 2019 07:01  -  10 Oct 2019 07:00  --------------------------------------------------------  IN: 1010 mL / OUT: 2500 mL / NET: -1490 mL    10 Oct 2019 07:01  -  11 Oct 2019 03:17  --------------------------------------------------------  IN: 995 mL / OUT: 1100 mL / NET: -105 mL      Exam:  A&O x 3, PERRL, pupils are bilaterally constricted at 2mm. EOMI  No gaze preference. No facial palsy.    No pronator drift, MERCADO x 4 nonfocal.  No focal motor deficit. 5/5 x 4  no sensory deficit to touch.  Lung: good air entry, clear lung sound.  Heart: S1S2 regular.  Abd: soft nn tender, +BS  Ext: No edema, calves soft, nontender to palpation    MEDICATIONS  (STANDING):  acetazolamide    Tablet 250 milliGRAM(s) Oral every 12 hours  dexamethasone  Injectable 6 milliGRAM(s) IV Push every 6 hours  docusate sodium 100 milliGRAM(s) Oral three times a day  enoxaparin Injectable 65 milliGRAM(s) SubCutaneous every 12 hours  HYDROmorphone PCA (1 mG/mL) 30 milliLiter(s) PCA Continuous PCA Continuous  influenza   Vaccine 0.5 milliLiter(s) IntraMuscular once  levothyroxine 200 MICROGram(s) Oral daily  sodium chloride 0.9%. 1000 milliLiter(s) (75 mL/Hr) IV Continuous <Continuous>    MEDICATIONS  (PRN):  acetaminophen   Tablet .. 325 milliGRAM(s) Oral every 4 hours PRN Moderate Pain (4 - 6)  acetaminophen 300 mG/butalbital 50 mG/ caffeine 40 mG 1 Capsule(s) Oral every 4 hours PRN headache  ondansetron Injectable 4 milliGRAM(s) IV Push every 6 hours PRN Nausea and/or Vomiting  traMADol 25 milliGRAM(s) Oral every 4 hours PRN Severe Pain (7 - 10)      Labs:                      13.9   11.65 )-----------( 763      ( 10 Oct 2019 05:50 )             45.5   10-10    140  |  107  |  12  ----------------------------<  140<H>  4.3   |  21<L>  |  0.94    Ca    9.1      10 Oct 2019 05:50    Radiology: MRV Prelim Read: severe narrowing of right sigmoid sinus and transverse sinus.  Severe narrowing and irregularity of the sagittal sinus.    Await official read of MRA/MRV.    A/P  63 female with known cavernous meningioma being treated for sagittal sinus trombosis with severe headache, based on prelim read  - Dr. Felder of stroke on board recommends 2000 u bolus of heparin now, then goal PTT 60-80  - MRV/A Nova head official read pending   - Cont SQl 65BID for sag sinus thromb as per Dr. Felder  - Cont Diamox as per Dr. Felder  - decadron taper over 3 days to off  - Cont Pain meds PRN  - All above D/w Dr. Oro HPI:  63F with a h/o asthma, thyroid cancer in the past, intracavernous tumor/meningioma, and recently dx large acute sinus sagittal thrombosis on Lovenox x 1 week (followed by Dr. Tejeda) who p/w worsening headache tonight. She has been experiencing HAs x 3 months, left supraorbital region, previous they were controlled y NSAIDs, but since she has been dx with thrombosis on lovenox she cannot take NSAIDs. She has been taking Tylenol and Fioricet but these are not really helping. Pt woke up two hours PTA with severe exacerbation of her headache on the left side. No vision or speech change, no n/v, no f/c, no neck pain, no trauma or fall.  Patient was placed on Lovenox by Dr. Felder.  she is being admitted to get a MRV of brain.  Patient states that she quit smoking x 1 week due to severe headache. She is not using nicotine patch. (09 Oct 2019 04:30)    10/10 HD#2 RAHUL overnight, persistent headache, started Diamox/SQL/Decadr as per [facility], MRV/A Nova head - penidng, Exam unchanged since yesterday  10/11 HD#3 Imaging suggestive of progressive thrombosis, headaches continue.  Neuro exam intact.    ICU Vital Signs Last 24 Hrs  T(C): 36.4 (10 Oct 2019 22:10), Max: 36.7 (10 Oct 2019 12:25)  T(F): 97.5 (10 Oct 2019 22:10), Max: 98.1 (10 Oct 2019 12:25)  HR: 52 (10 Oct 2019 22:04) (48 - 55)  BP: 137/79 (10 Oct 2019 22:04) (100/61 - 137/79)  BP(mean): 102 (10 Oct 2019 22:04) (102 - 102)  ABP: --  ABP(mean): --  RR: 18 (10 Oct 2019 22:04) (17 - 18)  SpO2: 97% (10 Oct 2019 22:04) (94% - 98%)    I&O's Summary    09 Oct 2019 07:01  -  10 Oct 2019 07:00  --------------------------------------------------------  IN: 1010 mL / OUT: 2500 mL / NET: -1490 mL    10 Oct 2019 07:01  -  11 Oct 2019 03:17  --------------------------------------------------------  IN: 995 mL / OUT: 1100 mL / NET: -105 mL      Exam:  A&O x 3, PERRL, pupils are bilaterally constricted at 2mm. EOMI  No gaze preference. No facial palsy.    No pronator drift, MERCADO x 4 nonfocal.  No focal motor deficit. 5/5 x 4  no sensory deficit to touch.  Lung: good air entry, clear lung sound.  Heart: S1S2 regular.  Abd: soft nn tender, +BS  Ext: No edema, calves soft, nontender to palpation    MEDICATIONS  (STANDING):  acetazolamide    Tablet 250 milliGRAM(s) Oral every 12 hours  dexamethasone  Injectable 6 milliGRAM(s) IV Push every 6 hours  docusate sodium 100 milliGRAM(s) Oral three times a day  enoxaparin Injectable 65 milliGRAM(s) SubCutaneous every 12 hours  HYDROmorphone PCA (1 mG/mL) 30 milliLiter(s) PCA Continuous PCA Continuous  influenza   Vaccine 0.5 milliLiter(s) IntraMuscular once  levothyroxine 200 MICROGram(s) Oral daily  sodium chloride 0.9%. 1000 milliLiter(s) (75 mL/Hr) IV Continuous <Continuous>    MEDICATIONS  (PRN):  acetaminophen   Tablet .. 325 milliGRAM(s) Oral every 4 hours PRN Moderate Pain (4 - 6)  acetaminophen 300 mG/butalbital 50 mG/ caffeine 40 mG 1 Capsule(s) Oral every 4 hours PRN headache  ondansetron Injectable 4 milliGRAM(s) IV Push every 6 hours PRN Nausea and/or Vomiting  traMADol 25 milliGRAM(s) Oral every 4 hours PRN Severe Pain (7 - 10)      Labs:                      13.9   11.65 )-----------( 763      ( 10 Oct 2019 05:50 )             45.5   10-10    140  |  107  |  12  ----------------------------<  140<H>  4.3   |  21<L>  |  0.94    Ca    9.1      10 Oct 2019 05:50    Radiology: MRV Prelim Read: severe narrowing of right sigmoid sinus and transverse sinus.  Severe narrowing and irregularity of the sagittal sinus.    Await official read of MRA/MRV.    A/P  63 female with known cavernous meningioma being treated for sagittal sinus trombosis with severe headache, based on prelim read  - Dr. Felder of stroke on board recommends 2000 u bolus of heparin now, then goal PTT 60-80  - MRV/A Nova head official read pending   - On SQl 65BID for sag sinus thromb as per Dr. Felder, stop for hep gtt as per Dr Oro  - Keep NPO for possible angio today  - Cont Diamox as per Dr. Felder  - decadron taper over 3 days to off  - Cont Pain meds PRN  - All above D/w Dr. Oro

## 2019-10-11 NOTE — DIETITIAN INITIAL EVALUATION ADULT. - OTHER INFO
62 yo/female with PMHx asthma, thyroid cancer, intracavernous tumor/meningioma, w/recent diagnosis of large acute sinus sagittal thrombosis, presented with worsening headache. C/f possible progressive thrombosis, started on heparin gtt, and planned for cerebral angiogram today. Pt seen in room, awake, alert, very pleasant, visitors at bedside. She reports usually having a good appetite, no dietary restrictions. Currently NPO for test, reports not being very hungry at this time. She does endorse some nausea, had 1 episode emesis this AM (RN aware). Allergy to blue food dye reported. No difficulty chewing or swallowing. Skin intact pressure-wise. Pain well controlled currently on dilaudid PCA. Discussed diet advancement post-test, general, healthy PO intake. Will continue to follow per RD protocol.

## 2019-10-12 LAB
ANION GAP SERPL CALC-SCNC: 10 MMOL/L — SIGNIFICANT CHANGE UP (ref 5–17)
APTT BLD: 43.1 SEC — HIGH (ref 27.5–36.3)
APTT BLD: 62 SEC — HIGH (ref 27.5–36.3)
APTT BLD: 71.8 SEC — HIGH (ref 27.5–36.3)
APTT BLD: 96.4 SEC — HIGH (ref 27.5–36.3)
BUN SERPL-MCNC: 18 MG/DL — SIGNIFICANT CHANGE UP (ref 7–23)
CALCIUM SERPL-MCNC: 8.4 MG/DL — SIGNIFICANT CHANGE UP (ref 8.4–10.5)
CHLORIDE SERPL-SCNC: 110 MMOL/L — HIGH (ref 96–108)
CO2 SERPL-SCNC: 18 MMOL/L — LOW (ref 22–31)
CREAT SERPL-MCNC: 0.81 MG/DL — SIGNIFICANT CHANGE UP (ref 0.5–1.3)
GLUCOSE SERPL-MCNC: 123 MG/DL — HIGH (ref 70–99)
HCT VFR BLD CALC: 37.2 % — SIGNIFICANT CHANGE UP (ref 34.5–45)
HGB BLD-MCNC: 11.5 G/DL — SIGNIFICANT CHANGE UP (ref 11.5–15.5)
INR BLD: 1 — SIGNIFICANT CHANGE UP (ref 0.88–1.16)
MAGNESIUM SERPL-MCNC: 1.9 MG/DL — SIGNIFICANT CHANGE UP (ref 1.6–2.6)
MCHC RBC-ENTMCNC: 28.8 PG — SIGNIFICANT CHANGE UP (ref 27–34)
MCHC RBC-ENTMCNC: 30.9 GM/DL — LOW (ref 32–36)
MCV RBC AUTO: 93 FL — SIGNIFICANT CHANGE UP (ref 80–100)
NRBC # BLD: 0 /100 WBCS — SIGNIFICANT CHANGE UP (ref 0–0)
PHOSPHATE SERPL-MCNC: 3.3 MG/DL — SIGNIFICANT CHANGE UP (ref 2.5–4.5)
PLATELET # BLD AUTO: 618 K/UL — HIGH (ref 150–400)
POTASSIUM SERPL-MCNC: 4.1 MMOL/L — SIGNIFICANT CHANGE UP (ref 3.5–5.3)
POTASSIUM SERPL-SCNC: 4.1 MMOL/L — SIGNIFICANT CHANGE UP (ref 3.5–5.3)
PROTHROM AB SERPL-ACNC: 11.3 SEC — SIGNIFICANT CHANGE UP (ref 10–12.9)
RBC # BLD: 4 M/UL — SIGNIFICANT CHANGE UP (ref 3.8–5.2)
RBC # FLD: 13.6 % — SIGNIFICANT CHANGE UP (ref 10.3–14.5)
SODIUM SERPL-SCNC: 138 MMOL/L — SIGNIFICANT CHANGE UP (ref 135–145)
WBC # BLD: 14.22 K/UL — HIGH (ref 3.8–10.5)
WBC # FLD AUTO: 14.22 K/UL — HIGH (ref 3.8–10.5)

## 2019-10-12 PROCEDURE — 70450 CT HEAD/BRAIN W/O DYE: CPT | Mod: 26,59

## 2019-10-12 PROCEDURE — 70496 CT ANGIOGRAPHY HEAD: CPT | Mod: 26

## 2019-10-12 PROCEDURE — 99233 SBSQ HOSP IP/OBS HIGH 50: CPT

## 2019-10-12 PROCEDURE — 70551 MRI BRAIN STEM W/O DYE: CPT | Mod: 26

## 2019-10-12 PROCEDURE — 99291 CRITICAL CARE FIRST HOUR: CPT

## 2019-10-12 RX ORDER — POLYETHYLENE GLYCOL 3350 17 G/17G
17 POWDER, FOR SOLUTION ORAL DAILY
Refills: 0 | Status: DISCONTINUED | OUTPATIENT
Start: 2019-10-12 | End: 2019-10-20

## 2019-10-12 RX ORDER — HEPARIN SODIUM 5000 [USP'U]/ML
900 INJECTION INTRAVENOUS; SUBCUTANEOUS
Qty: 25000 | Refills: 0 | Status: DISCONTINUED | OUTPATIENT
Start: 2019-10-12 | End: 2019-10-12

## 2019-10-12 RX ORDER — DEXAMETHASONE 0.5 MG/5ML
1 ELIXIR ORAL EVERY 6 HOURS
Refills: 0 | Status: COMPLETED | OUTPATIENT
Start: 2019-10-14 | End: 2019-10-15

## 2019-10-12 RX ORDER — DEXAMETHASONE 0.5 MG/5ML
2 ELIXIR ORAL EVERY 6 HOURS
Refills: 0 | Status: COMPLETED | OUTPATIENT
Start: 2019-10-13 | End: 2019-10-14

## 2019-10-12 RX ORDER — DEXAMETHASONE 0.5 MG/5ML
4 ELIXIR ORAL EVERY 6 HOURS
Refills: 0 | Status: COMPLETED | OUTPATIENT
Start: 2019-10-12 | End: 2019-10-13

## 2019-10-12 RX ORDER — DEXAMETHASONE 0.5 MG/5ML
ELIXIR ORAL
Refills: 0 | Status: COMPLETED | OUTPATIENT
Start: 2019-10-12 | End: 2019-10-15

## 2019-10-12 RX ORDER — HEPARIN SODIUM 5000 [USP'U]/ML
950 INJECTION INTRAVENOUS; SUBCUTANEOUS
Qty: 25000 | Refills: 0 | Status: DISCONTINUED | OUTPATIENT
Start: 2019-10-12 | End: 2019-10-16

## 2019-10-12 RX ADMIN — Medication 100 MILLIGRAM(S): at 06:27

## 2019-10-12 RX ADMIN — Medication 6 MILLIGRAM(S): at 00:32

## 2019-10-12 RX ADMIN — Medication 200 MICROGRAM(S): at 06:27

## 2019-10-12 RX ADMIN — SODIUM CHLORIDE 150 MILLILITER(S): 9 INJECTION INTRAMUSCULAR; INTRAVENOUS; SUBCUTANEOUS at 00:32

## 2019-10-12 RX ADMIN — Medication 6 MILLIGRAM(S): at 14:32

## 2019-10-12 RX ADMIN — Medication 100 MILLIGRAM(S): at 13:41

## 2019-10-12 RX ADMIN — Medication 6 MILLIGRAM(S): at 06:28

## 2019-10-12 RX ADMIN — ACETAZOLAMIDE 250 MILLIGRAM(S): 250 TABLET ORAL at 18:29

## 2019-10-12 RX ADMIN — Medication 81 MILLIGRAM(S): at 13:41

## 2019-10-12 RX ADMIN — Medication 100 MILLIGRAM(S): at 22:17

## 2019-10-12 RX ADMIN — HEPARIN SODIUM 9 UNIT(S)/HR: 5000 INJECTION INTRAVENOUS; SUBCUTANEOUS at 06:45

## 2019-10-12 RX ADMIN — Medication 4 MILLIGRAM(S): at 18:29

## 2019-10-12 RX ADMIN — HYDROMORPHONE HYDROCHLORIDE 30 MILLILITER(S): 2 INJECTION INTRAMUSCULAR; INTRAVENOUS; SUBCUTANEOUS at 18:28

## 2019-10-12 RX ADMIN — ACETAZOLAMIDE 250 MILLIGRAM(S): 250 TABLET ORAL at 06:28

## 2019-10-12 RX ADMIN — POLYETHYLENE GLYCOL 3350 17 GRAM(S): 17 POWDER, FOR SOLUTION ORAL at 13:41

## 2019-10-12 RX ADMIN — Medication 4 MILLIGRAM(S): at 23:36

## 2019-10-12 NOTE — PROGRESS NOTE ADULT - ASSESSMENT
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  Neurologically intact only with headaches, but brought to ICU for closer neurologic monitoring.   Endorses significant nausea and vomiting after starting PCA, however it did help her HA significantly.    Plan  Neuro  Neurochecks q1h  -defer repeat imaging until likely angiogram monday, d/w dr silver  -start ASA 81  -CT/CTV today to reassess clot due to ? ishan  -brain MRI (just DWI, SWI, FLAIR) - if no evidence of bleed or edema, will increase PTT to full therapeutic goal (58-99) (currently 60-80)  -heparin drip full anticoagulation   -pain control: dilaudid PCA, trial reglan 10mg x1    Heme) 1) c/f essential thrombocytosis however trombocytosis was not present on outpatient labs before 2018,may be reactive  -pending heme laboratory workup for ET and other hypergoaguable states, appreciate reccs  -f/u JAK2 mutation  -DVT ppx: on full dose AC  -q6h PTT    GI: PO as tolerated  -bowel regimen  -start miralax daily, perla BM yet    Renal: IVF's  -NS at 150/hr for good hydration    Access: PIV's    Sylvester Car MD  Attending Neurointensivist

## 2019-10-12 NOTE — PROGRESS NOTE ADULT - SUBJECTIVE AND OBJECTIVE BOX
***Neurocritical care attending note***    63/F with intracavernous tumopr/meningioma and recent diagnosis of 3 months of headaches found to have acute sinus thrombosis put on lovenox for 1 week as outpatient, now admitted with worse headaches.  Quit smoking 1 week ago. started on diamox this hospitalization.  MRV read yesterday appeared to show worsening clot burden, transitioned to heparin drip as a more established anticoagulant for this type of thrombosis.  Neurologically doing well, but brought to ICU for closer neurologic monitoring.   Endorses signifciant nausea and vomiting after starting PCA, however it did help her HA significantly.    PMH: hx thyroid cancer, asthma    24h events: nausea and pain moderately improved this morning.       Exam:   Gen: middle aged woman appears uncomfortable  CV: RRR  Pulm: CTA b/l  Abd: soft NTND    MSE: awake, alert, oriented x3, follows multistep commands, attention normal, language fluent with intact naming  CN: LYDIA, EOMI, VFF, face symmetric, TUP midline, no dysarthria  Motor: ? trace L pronator drift, full strength to confrontation in all extremities  Sensory: intact to LT throughout         Allergies: penicillin (Hives)    VITALS:  T(C): 36.2 (10-12-19 @ 01:44), Max: 37.1 (10-11-19 @ 22:13)  HR: 40 (10-12-19 @ 09:00) (38 - 56)  BP: 106/65 (10-12-19 @ 09:00) (97/57 - 121/57)  RR: 27 (10-12-19 @ 09:00) (12 - 37)  SpO2: 95% (10-12-19 @ 09:00) (93% - 96%)      MEDICATIONS:  acetaminophen   Tablet .. 325 milliGRAM(s) Oral every 4 hours PRN  acetaminophen 300 mG/butalbital 50 mG/ caffeine 40 mG 1 Capsule(s) Oral every 4 hours PRN  acetazolamide    Tablet 250 milliGRAM(s) Oral every 12 hours  aspirin enteric coated 81 milliGRAM(s) Oral daily  dexamethasone  Injectable 6 milliGRAM(s) IV Push every 6 hours  docusate sodium 100 milliGRAM(s) Oral three times a day  heparin  Infusion 900 Unit(s)/Hr IV Continuous <Continuous>  HYDROmorphone PCA (1 mG/mL) 30 milliLiter(s) PCA Continuous PCA Continuous  influenza   Vaccine 0.5 milliLiter(s) IntraMuscular once  levothyroxine 200 MICROGram(s) Oral daily  ondansetron Injectable 4 milliGRAM(s) IV Push every 6 hours PRN  sodium chloride 0.9%. 1000 milliLiter(s) IV Continuous <Continuous>  traMADol 25 milliGRAM(s) Oral every 4 hours PRN      I/O's    10-11-19 @ 07:01  -  10-12-19 @ 07:00  --------------------------------------------------------  IN: 3346.5 mL / OUT: 750 mL / NET: 2596.5 mL    10-12-19 @ 07:01  -  10-12-19 @ 09:37  --------------------------------------------------------  IN: 318 mL / OUT: 0 mL / NET: 318 mL        Ventilator data:        LABS:                          11.5   14.22 )-----------( 618      ( 12 Oct 2019 05:30 )             37.2     10-12    138  |  110<H>  |  18  ----------------------------<  123<H>  4.1   |  18<L>  |  0.81    Ca    8.4      12 Oct 2019 05:30  Phos  3.3     10-12  Mg     1.9     10-12      PT/INR - ( 11 Oct 2019 05:44 )   PT: 11.3 sec;   INR: 1.00          PTT - ( 12 Oct 2019 08:22 )  PTT:43.1 sec      -------------------------          MRA/MRV 10/9/19    EXAM:  MR VENOGRAM BRAIN                          EXAM:  MR ANGIO BRAIN                          PROCEDURE DATE:  10/10/2019          INTERPRETATION:  ICaroline MD, have reviewed the images and the   report and agree with the findings, with the following modification:     TECHNIQUE: The MRA was performed utilizing 3 D TOF technique.   Intravascular flow quantification was performed on the intracranial   vessels using gated 2-D phase contrast technique as part of Non-invasive   Optimal Vessel analysis (NOVA). The MRV was performed utilizing 2D TOF   technique. Intravascular flow quantification was performed on the   intracranial vessels using gated 2-D phase contrast technique as part of   Non-invasive Optimal Vessel analysis (NOVA).In addition, FLAIR and   diffusion weighted images of the brain were obtained.    The MRA examination demonstrates a attenuated caliber and signal within   the distal left internal carotid artery extending to the skull base.   There is poor visualization of signal within the cavernous and clinoid   segments which may be secondary to occlusion/severe stenosis. Signal is   identified within the supraclinoid segment extending to the left carotid   terminus. There is a small left A1 segment which may be secondary to   underdevelopment. There is a somewhat fusiform appearance to the distal   left A1 segment and proximal A2 segment (series 203 image 30). The distal   left A2 segment is otherwise intact. The anterior communicating artery is   patent. The left middle cerebral artery appears intact. The right   vertebral artery is underdeveloped. There is a normal bifurcation into   the posterior cerebral arteries bilaterally. The right P1 segment is   nearly aplastic with a prominent right posterior communicating artery   supplying the right PCA territory. The left posterior communicating   artery is patent.    The MRV examination demonstrates lack of visualization of signal within   the proximal portion of the superior sagittal sinus. There is an   attenuated caliber with irregular signal within the rest of the superior   sagittal sinus extending to its proximal one third. Normal signal is   identified within the anterior one third of the of the superior sagittal   sinus. There is poor irregular signal identified within the right   transverse sinus extending to the right jugular bulb bulb. The left   transverse sinus and jugular vein is dominant.    NOVA analysis demonstrates:    MARKUS        291  ml/min                 LICA          19    ml/min  RACA      168                               LACA        -31         RACA2      55                               LACA2       51  RMCA      105                               LMCA        113         RPCOM     54                               LPCOM      35                  BA           160      ml/min  RVA           89                LVA           135              RPCA        56                LPCA         56             RJV               112  ml/min       LJV                 283  ml/min  R sigmoid        99                    L sigmoid       368       R transverse    17                    L transverse   227    VOG                33  Straight         172                                                  SSS                18                                          ISS                  27    Limited evaluation of the brain demonstrates the ventricles, cisternal   spaces and cortical sulci to be appropriate for the patient's stated   age.The FLAIR images demonstrate a few scattered punctate foci of   increased signal within the periventricular and subcortical white matter   which is nonspecific and most likely represents the sequela of small   vessel ischemic disease in this patient. The diffusion-weighted images   demonstrate no acute ischemia.    There is a isointense soft tissue mass involving the left cavernous   sinuses with extension along the left middle cranial fossa with narrowing   of the left internal carotid artery flow void. There is hyperintense   signal within the left internal carotid flow-void. These findings are   compatible with the patient's underlying cavernous sinuses meningioma.    Limited evaluation of the orbits demonstrates asymmetric prominence of   the right superior ophthalmic vein.    ******PRELIMINARY REPORT******     MRA HEAD    IMPRESSION: Severe narrowing of the visualized intracranial cervical   internal carotid artery with area of occlusion in the mid segment. Severe   narrowing of the proximal and mid petrous segment with occluded distal   petrous and cavernous segment with reconstitution of the supraclinoid   segment.  Persistent fetal origin of the right posterior cerebral artery.  Hypoplastic left A1 segment. Mild narrowing of the left A2 segment. Left   M1 segment is unremarkable. Mild narrowing of the left M2 and M3 segments   compared to the right. No evidence of restricted diffusion to suggest an   acute infarct. Previously seen cavernous sinus meningioma is difficult to   evaluate on this noncontrast study. No midline shift. No hemorrhage.    MR HEAD  IMPRESSION:  No acute intracranial abnormality.  Mild small vessel ischemic changes.  Meningioma seen in the cavernous sinus on the previous study is not seen   on this noncontrast study. Sinus disease.      ________________________________    PROCEDURE INFORMATION:  Exam: MR venogram Without and With Contrast    Exam date and time: 10/10/2019 8:01 PM  FINDINGS:  Severe narrowing and irregularity of the right sigmoid sinus and   transverse sinus. Severe narrowing  and irregularity of the sagittal sinus.  Right internal jugular vein is smaller in caliber compared to the left.    IMPRESSION:  Severe narrowing and irregularity of the right sigmoid sinus and   transverse sinus. Severe narrowing and irregularity of the sagittal   sinus. Findings are concerning for venous thrombosis

## 2019-10-12 NOTE — PROGRESS NOTE ADULT - ATTENDING COMMENTS
ATTENDING ATTESTATION:  I was physically present for the key portions of the evaluation and management (E/M) service provided.  I agree with the above history, physical and plan, which I have reviewed and edited where appropriate.  Patient at high risk for neurological deterioration or death due to:  cerebral edemaor hemorrhage    Critical care time:  I have personally provided  35  minutes of critical care time, excluding time spent on separately-billable procedures.      Plan discussed with RN, PA team.
Patient seen and examined by me. Headaches significantly improved since starting decadron and diamox. Remains neuro intact. Lovenox switched to bid per Stroke service. Plan for ophthalmology consult to rule out papilledema. Awaiting MRV.    Harjinder Oro M.D.
Patient with sinus thrombosis, initially sagittal now extending to include transverse. Patient continues to have intractable headaches. Neurologically intact. Heparin gtt and aspirin started per Stroke service. Patient transferred to ICU. Consider angio/thrombectomy pending clinical status. Heme workup pending.    Harjinder Oro M.D.

## 2019-10-12 NOTE — PROGRESS NOTE ADULT - SUBJECTIVE AND OBJECTIVE BOX
HPI:  63F with a h/o asthma, thyroid cancer in the past, intracavernous tumor/meningioma, and recently dx large acute sinus sagittal thrombosis on Lovenox x 1 week (followed by Dr. Tejeda) who p/w worsening headache tonight. She has been experiencing HAs x 3 months, left supraorbital region, previous they were controlled y NSAIDs, but since she has been dx with thrombosis on lovenox she cannot take NSAIDs. She has been taking Tylenol and Fioricet but these are not really helping. Pt woke up two hours PTA with severe exacerbation of her headache on the left side. No vision or speech change, no n/v, no f/c, no neck pain, no trauma or fall.  Patient was placed on Lovenox by Dr. Felder.  she is being admitted to get a MRV of brain.  Patient states that she quit smoking x 1 week due to severe headache. She is not using nicotine patch. (09 Oct 2019 04:30)        Hospital Course:   10/10: HD#2 RAHUL overnight, persistent headache, started Diamox/SQL/Decadr as per [facility], MRV/A Nova head - penidng, Exam unchanged since yesterday  10/11: HD#3 Imaging suggestive of progressive thrombosis, headaches continue.  Neuro exam intact.  10/12: HD#4 RAHUL overnight. Repeat imaging today, CTH/CTA/CTV and MRI. Remains on heparin drip, ptt goal 59-99. Continue meds for pain and n/v.      Vital Signs Last 24 Hrs  T(C): 36.4 (12 Oct 2019 14:19), Max: 37.1 (11 Oct 2019 22:13)  T(F): 97.5 (12 Oct 2019 14:19), Max: 98.8 (11 Oct 2019 22:13)  HR: 36 (12 Oct 2019 16:00) (36 - 56)  BP: 123/57 (12 Oct 2019 16:00) (102/46 - 128/54)  BP(mean): 78 (12 Oct 2019 16:00) (62 - 88)  RR: 20 (12 Oct 2019 16:00) (12 - 37)  SpO2: 96% (12 Oct 2019 16:00) (93% - 98%)    I&O's Detail    11 Oct 2019 07:01  -  12 Oct 2019 07:00  --------------------------------------------------------  IN:    heparin Infusion: 147.5 mL    heparin Infusion: 27 mL    heparin Infusion: 59.5 mL    heparin Infusion: 37.5 mL    sodium chloride 0.9%: 375 mL    sodium chloride 0.9%.: 2700 mL  Total IN: 3346.5 mL    OUT:    Emesis: 50 mL    Voided: 700 mL  Total OUT: 750 mL    Total NET: 2596.5 mL      12 Oct 2019 07:01  -  12 Oct 2019 16:31  --------------------------------------------------------  IN:    heparin Infusion: 18 mL    heparin Infusion: 47.5 mL    sodium chloride 0.9%.: 1050 mL  Total IN: 1115.5 mL    OUT:    Voided: 650 mL  Total OUT: 650 mL    Total NET: 465.5 mL        I&O's Summary    11 Oct 2019 07:01  -  12 Oct 2019 07:00  --------------------------------------------------------  IN: 3346.5 mL / OUT: 750 mL / NET: 2596.5 mL    12 Oct 2019 07:01  -  12 Oct 2019 16:31  --------------------------------------------------------  IN: 1115.5 mL / OUT: 650 mL / NET: 465.5 mL        PHYSICAL EXAM:  Neurological:  Awake and alert, oriented x3, NAD, coherent speech, following commands  PERRL, EOMI, face symmetric  MAEx4, strength 5/5 UE and LE, ?mild LUE drift?  SILT throughout      TUBES/LINES:  [] CVC  [] A-line  [] Lumbar Drain  [] Ventriculostomy  [] PEDRO  [] Nunez  [] NGT   [] Other    DIET:  [] NPO  [x] Mechanical  [] Tube feeds    LABS:                        11.5   14.22 )-----------( 618      ( 12 Oct 2019 05:30 )             37.2     10-12    138  |  110<H>  |  18  ----------------------------<  123<H>  4.1   |  18<L>  |  0.81    Ca    8.4      12 Oct 2019 05:30  Phos  3.3     10-12  Mg     1.9     10-12      PT/INR - ( 11 Oct 2019 05:44 )   PT: 11.3 sec;   INR: 1.00          PTT - ( 12 Oct 2019 08:22 )  PTT:43.1 sec        CAPILLARY BLOOD GLUCOSE          Drug Levels: [] N/A    CSF Analysis: [] N/A      Allergies    penicillin (Hives)    Intolerances        Home Medications:  Lovenox 100 mg/mL injectable solution: injectable once a day (09 Oct 2019 04:36)  Synthroid 200 mcg (0.2 mg) oral tablet: 1 tab(s) orally once a day (09 Oct 2019 04:36)      MEDICATIONS:  Antibiotics:    Neuro:  acetaminophen   Tablet .. 325 milliGRAM(s) Oral every 4 hours PRN  acetaminophen 300 mG/butalbital 50 mG/ caffeine 40 mG 1 Capsule(s) Oral every 4 hours PRN  HYDROmorphone PCA (1 mG/mL) 30 milliLiter(s) PCA Continuous PCA Continuous  ondansetron Injectable 4 milliGRAM(s) IV Push every 6 hours PRN  traMADol 25 milliGRAM(s) Oral every 4 hours PRN    Anticoagulation:  aspirin enteric coated 81 milliGRAM(s) Oral daily  heparin  Infusion 950 Unit(s)/Hr IV Continuous <Continuous>    OTHER:  acetazolamide    Tablet 250 milliGRAM(s) Oral every 12 hours  bisacodyl Suppository 10 milliGRAM(s) Rectal daily PRN  dexamethasone  Injectable   IV Push   dexamethasone  Injectable 4 milliGRAM(s) IV Push every 6 hours  docusate sodium 100 milliGRAM(s) Oral three times a day  influenza   Vaccine 0.5 milliLiter(s) IntraMuscular once  levothyroxine 200 MICROGram(s) Oral daily  polyethylene glycol 3350 17 Gram(s) Oral daily    IVF:  sodium chloride 0.9%. 1000 milliLiter(s) IV Continuous <Continuous>    CULTURES:    RADIOLOGY & ADDITIONAL TESTS:      ASSESSMENT:  63y Female s/p    HEADACHE  Handoff  MEWS Score  Thyroid carcinoma  Asthma  Thrombosis, superior sagittal sinus  Brain tumor  Headache  Thrombocytosis  Thyroid carcinoma  Brain tumor  Thrombosis, superior sagittal sinus  H/O thyroidectomy  PALPATATIONS      PLAN:  NEURO:  -neuro checks  -headache control  -nausea control  -f/u official imaging reads today- CTH/CTA/CTV, MRI  -continue heparin gtt for sinus thrombosis- PTT goal (full AC goal) 59-99  -continue ASA 81  -decadron taper x 3 days    CARDIOVASCULAR:  -SBP goal normotensive     PULMONARY:  -room air, satting well     RENAL:  -NS@150cc/hr  -voiding well     GI:  -regular diet  -bowel regimen  -GI ppx    HEME:  -Veronica following  -continue hep gtt   -trend platelets  -f/u Wilda 2    ID:  -afebrile    ENDO:  -ISS    DVT PROPHYLAXIS:  [x] Venodynes                                [x] Heparin/Lovenox    FALL RISK:  [] Low Risk                                    [] Impulsive    DISPOSITION:   -ICU status   -Full code  -PT/OT pending   -Discussed with Dr. Tejeda and Dr. Car     Assessment:  Present when checked    []  GCS  E   V  M     Heart Failure: []Acute, [] acute on chronic , []chronic  Heart Failure:  [] Diastolic (HFpEF), [] Systolic (HFrEF), []Combined (HFpEF and HFrEF), [] RHF, [] Pulm HTN, [] Other    [] ARIEL, [] ATN, [] AIN, [] other  [] CKD1, [] CKD2, [] CKD 3, [] CKD 4, [] CKD 5, []ESRD    Encephalopathy: [] Metabolic, [] Hepatic, [] toxic, [] Neurological, [] Other    Abnormal Nurtitional Status: [] malnurtition (see nutrition note), [ ]underweight: BMI < 19, [] morbid obesity: BMI >40, [] Cachexia    [] Sepsis  [] hypovolemic shock,[] cardiogenic shock, [] hemorrhagic shock, [] neuogenic shock  [] Acute Respiratory Failure  []Cerebral edema, [] Brain compression/ herniation,   [] Functional quadriplegia  [] Acute blood loss anemia

## 2019-10-13 DIAGNOSIS — D72.829 ELEVATED WHITE BLOOD CELL COUNT, UNSPECIFIED: ICD-10-CM

## 2019-10-13 DIAGNOSIS — E87.2 ACIDOSIS: ICD-10-CM

## 2019-10-13 DIAGNOSIS — R00.1 BRADYCARDIA, UNSPECIFIED: ICD-10-CM

## 2019-10-13 DIAGNOSIS — R51 HEADACHE: ICD-10-CM

## 2019-10-13 DIAGNOSIS — E03.9 HYPOTHYROIDISM, UNSPECIFIED: ICD-10-CM

## 2019-10-13 DIAGNOSIS — J45.909 UNSPECIFIED ASTHMA, UNCOMPLICATED: ICD-10-CM

## 2019-10-13 DIAGNOSIS — Z29.9 ENCOUNTER FOR PROPHYLACTIC MEASURES, UNSPECIFIED: ICD-10-CM

## 2019-10-13 LAB
ANION GAP SERPL CALC-SCNC: 11 MMOL/L — SIGNIFICANT CHANGE UP (ref 5–17)
ANION GAP SERPL CALC-SCNC: 8 MMOL/L — SIGNIFICANT CHANGE UP (ref 5–17)
APTT BLD: 79 SEC — HIGH (ref 27.5–36.3)
APTT BLD: 87.9 SEC — HIGH (ref 27.5–36.3)
BUN SERPL-MCNC: 16 MG/DL — SIGNIFICANT CHANGE UP (ref 7–23)
BUN SERPL-MCNC: 16 MG/DL — SIGNIFICANT CHANGE UP (ref 7–23)
CALCIUM SERPL-MCNC: 8.1 MG/DL — LOW (ref 8.4–10.5)
CALCIUM SERPL-MCNC: 8.4 MG/DL — SIGNIFICANT CHANGE UP (ref 8.4–10.5)
CHLORIDE SERPL-SCNC: 111 MMOL/L — HIGH (ref 96–108)
CHLORIDE SERPL-SCNC: 113 MMOL/L — HIGH (ref 96–108)
CO2 SERPL-SCNC: 17 MMOL/L — LOW (ref 22–31)
CO2 SERPL-SCNC: 17 MMOL/L — LOW (ref 22–31)
CREAT SERPL-MCNC: 0.77 MG/DL — SIGNIFICANT CHANGE UP (ref 0.5–1.3)
CREAT SERPL-MCNC: 0.78 MG/DL — SIGNIFICANT CHANGE UP (ref 0.5–1.3)
GLUCOSE SERPL-MCNC: 113 MG/DL — HIGH (ref 70–99)
GLUCOSE SERPL-MCNC: 116 MG/DL — HIGH (ref 70–99)
HCT VFR BLD CALC: 38.5 % — SIGNIFICANT CHANGE UP (ref 34.5–45)
HGB BLD-MCNC: 11.8 G/DL — SIGNIFICANT CHANGE UP (ref 11.5–15.5)
MAGNESIUM SERPL-MCNC: 1.8 MG/DL — SIGNIFICANT CHANGE UP (ref 1.6–2.6)
MCHC RBC-ENTMCNC: 28.7 PG — SIGNIFICANT CHANGE UP (ref 27–34)
MCHC RBC-ENTMCNC: 30.6 GM/DL — LOW (ref 32–36)
MCV RBC AUTO: 93.7 FL — SIGNIFICANT CHANGE UP (ref 80–100)
NRBC # BLD: 0 /100 WBCS — SIGNIFICANT CHANGE UP (ref 0–0)
PHOSPHATE SERPL-MCNC: 3.7 MG/DL — SIGNIFICANT CHANGE UP (ref 2.5–4.5)
PLATELET # BLD AUTO: 522 K/UL — HIGH (ref 150–400)
POTASSIUM SERPL-MCNC: 3.5 MMOL/L — SIGNIFICANT CHANGE UP (ref 3.5–5.3)
POTASSIUM SERPL-MCNC: SIGNIFICANT CHANGE UP MMOL/L (ref 3.5–5.3)
POTASSIUM SERPL-SCNC: 3.5 MMOL/L — SIGNIFICANT CHANGE UP (ref 3.5–5.3)
POTASSIUM SERPL-SCNC: SIGNIFICANT CHANGE UP MMOL/L (ref 3.5–5.3)
RBC # BLD: 4.11 M/UL — SIGNIFICANT CHANGE UP (ref 3.8–5.2)
RBC # FLD: 13.6 % — SIGNIFICANT CHANGE UP (ref 10.3–14.5)
SODIUM SERPL-SCNC: 136 MMOL/L — SIGNIFICANT CHANGE UP (ref 135–145)
SODIUM SERPL-SCNC: 141 MMOL/L — SIGNIFICANT CHANGE UP (ref 135–145)
WBC # BLD: 13.22 K/UL — HIGH (ref 3.8–10.5)
WBC # FLD AUTO: 13.22 K/UL — HIGH (ref 3.8–10.5)

## 2019-10-13 PROCEDURE — 99232 SBSQ HOSP IP/OBS MODERATE 35: CPT

## 2019-10-13 PROCEDURE — 99233 SBSQ HOSP IP/OBS HIGH 50: CPT

## 2019-10-13 RX ORDER — SODIUM CHLORIDE 9 MG/ML
1000 INJECTION, SOLUTION INTRAVENOUS
Refills: 0 | Status: DISCONTINUED | OUTPATIENT
Start: 2019-10-13 | End: 2019-10-16

## 2019-10-13 RX ORDER — TRAMADOL HYDROCHLORIDE 50 MG/1
25 TABLET ORAL EVERY 4 HOURS
Refills: 0 | Status: DISCONTINUED | OUTPATIENT
Start: 2019-10-13 | End: 2019-10-15

## 2019-10-13 RX ORDER — OXYCODONE HYDROCHLORIDE 5 MG/1
10 TABLET ORAL EVERY 4 HOURS
Refills: 0 | Status: DISCONTINUED | OUTPATIENT
Start: 2019-10-13 | End: 2019-10-13

## 2019-10-13 RX ORDER — POTASSIUM CHLORIDE 20 MEQ
40 PACKET (EA) ORAL EVERY 4 HOURS
Refills: 0 | Status: COMPLETED | OUTPATIENT
Start: 2019-10-13 | End: 2019-10-13

## 2019-10-13 RX ORDER — OXYCODONE HYDROCHLORIDE 5 MG/1
5 TABLET ORAL EVERY 4 HOURS
Refills: 0 | Status: DISCONTINUED | OUTPATIENT
Start: 2019-10-13 | End: 2019-10-15

## 2019-10-13 RX ADMIN — Medication 200 MICROGRAM(S): at 07:14

## 2019-10-13 RX ADMIN — HEPARIN SODIUM 9.5 UNIT(S)/HR: 5000 INJECTION INTRAVENOUS; SUBCUTANEOUS at 07:14

## 2019-10-13 RX ADMIN — ACETAZOLAMIDE 250 MILLIGRAM(S): 250 TABLET ORAL at 07:14

## 2019-10-13 RX ADMIN — Medication 81 MILLIGRAM(S): at 12:41

## 2019-10-13 RX ADMIN — SODIUM CHLORIDE 150 MILLILITER(S): 9 INJECTION INTRAMUSCULAR; INTRAVENOUS; SUBCUTANEOUS at 10:40

## 2019-10-13 RX ADMIN — Medication 10 MILLIGRAM(S): at 18:58

## 2019-10-13 RX ADMIN — Medication 40 MILLIEQUIVALENT(S): at 14:11

## 2019-10-13 RX ADMIN — SODIUM CHLORIDE 150 MILLILITER(S): 9 INJECTION, SOLUTION INTRAVENOUS at 17:02

## 2019-10-13 RX ADMIN — POLYETHYLENE GLYCOL 3350 17 GRAM(S): 17 POWDER, FOR SOLUTION ORAL at 12:41

## 2019-10-13 RX ADMIN — Medication 100 MILLIGRAM(S): at 14:12

## 2019-10-13 RX ADMIN — Medication 40 MILLIEQUIVALENT(S): at 10:08

## 2019-10-13 RX ADMIN — Medication 4 MILLIGRAM(S): at 07:14

## 2019-10-13 RX ADMIN — ACETAZOLAMIDE 250 MILLIGRAM(S): 250 TABLET ORAL at 18:00

## 2019-10-13 RX ADMIN — Medication 100 MILLIGRAM(S): at 22:09

## 2019-10-13 RX ADMIN — Medication 100 MILLIGRAM(S): at 07:14

## 2019-10-13 RX ADMIN — Medication 2 MILLIGRAM(S): at 18:00

## 2019-10-13 RX ADMIN — HEPARIN SODIUM 9.5 UNIT(S)/HR: 5000 INJECTION INTRAVENOUS; SUBCUTANEOUS at 18:58

## 2019-10-13 RX ADMIN — Medication 4 MILLIGRAM(S): at 12:40

## 2019-10-13 RX ADMIN — Medication 2 MILLIGRAM(S): at 23:35

## 2019-10-13 NOTE — PROGRESS NOTE ADULT - SUBJECTIVE AND OBJECTIVE BOX
Transfer note: [facility] (Dr. Oro) to [facility] (Dr. Felder)     HPI:  63F with a h/o asthma, thyroid cancer in the past, intracavernous tumor/meningioma, and recently dx large acute sinus sagittal thrombosis on Lovenox x 1 week (followed by Dr. Tejeda) who p/w worsening headache tonight. She has been experiencing HAs x 3 months, left supraorbital region, previous they were controlled y NSAIDs, but since she has been dx with thrombosis on lovenox she cannot take NSAIDs. She has been taking Tylenol and Fioricet but these are not really helping. Pt woke up two hours PTA with severe exacerbation of her headache on the left side. No vision or speech change, no n/v, no f/c, no neck pain, no trauma or fall.  Patient was placed on Lovenox by Dr. Felder.  she is being admitted to get a MRV of brain.  Patient states that she quit smoking x 1 week due to severe headache. She is not using nicotine patch. (09 Oct 2019 04:30)        Hospital Course:   10/10: HD#2 [facility] overnight, persistent headache, started Diamox/SQL/Decadr as per [facility], MRV/A Nova head - penidng, Exam unchanged since yesterday  10/11: HD#3 Imaging suggestive of progressive thrombosis, headaches continue.  Neuro exam intact.  10/12: HD#4 RAHUL overnight. Repeat imaging today, CTH/CTA/CTV and MRI. Remains on heparin drip, ptt goal 59-99. Continue meds for pain and n/v.  10/13 HD#5 RAHUL overnight, Neuro exam unchanged since yesterday, Dr. Martin for Plts Hep gtt PTT goal 59-99, transfer to stroke neurology for further management     PHYSICAL EXAM:  Neurological:  Awake and alert, oriented x3, NAD, coherent speech, following commands  PERRL, EOMI, face symmetric  MAEx4, strength 5/5 UE and LE  No pronator drift   SILT throughout    ICU Vital Signs Last 24 Hrs  T(C): 36.9 (13 Oct 2019 14:31), Max: 36.9 (12 Oct 2019 18:05)  T(F): 98.4 (13 Oct 2019 14:31), Max: 98.5 (12 Oct 2019 18:05)  HR: 52 (13 Oct 2019 15:00) (36 - 52)  BP: 113/66 (13 Oct 2019 15:00) (83/62 - 144/60)  BP(mean): 81 (13 Oct 2019 15:00) (65 - 97)  ABP: --  ABP(mean): --  RR: 28 (13 Oct 2019 15:00) (15 - 34)  SpO2: 94% (13 Oct 2019 15:00) (92% - 96%)      MEDICATIONS:  aspirin enteric coated 81 Oral daily  heparin  Infusion 950 IV Continuous <Continuous>  acetazolamide    Tablet 250 milliGRAM(s) Oral every 12 hours  docusate sodium 100 Oral three times a day  polyethylene glycol 3350 17 Oral daily  dexamethasone  Injectable  IV Push   dexamethasone  Injectable 2 IV Push every 6 hours  levothyroxine 200 Oral daily  influenza   Vaccine 0.5 IntraMuscular once  sodium chloride 0.9%. 1000 IV Continuous <Continuous>  acetaminophen   Tablet .. 325 Oral every 4 hours PRN  acetaminophen 300 mG/butalbital 50 mG/ caffeine 40 mG 1 Oral every 4 hours PRN  bisacodyl Suppository 10 Rectal daily PRN  ondansetron Injectable 4 IV Push every 6 hours PRN  oxyCODONE    IR 5 Oral every 4 hours PRN  oxyCODONE    IR 10 Oral every 4 hours PRN  traMADol 25 Oral every 4 hours PRN      ASSESSMENT:  63 female with known cavernous meningioma being treated for sagittal sinus trombosis with severe headache    PLAN:  NEURO:  -neuro checks  -headache control - pain improving, off dilaudid PCA   -nausea control  -repeat imaging if change in exam   -continue heparin gtt for sinus thrombosis- PTT goal (full AC goal) 59-99, next PTT drawn at 3pm, results pending. Trend Q6.  - Likely will require 3-6 months of full anticoagulation   -continue ASA 81  - Cont tapering decadron    CARDIOVASCULAR:  -SBP goal normotensive     PULMONARY:  -room air, satting well     RENAL:  -NS@150cc/hr to prevent clotting 2/2 thrombocytosis  -voiding well     GI:  -regular diet  -bowel regimen  -GI ppx    HEME:  -Dr. Martin following for elevated platelets   -continue hep gtt PTT goal 59-99  -trend platelets  -f/u Wilda 2    ID:  -afebrile  - leukocytosis most likely due to Decadron (trending down)     ENDO:  -ISS    DVT PROPHYLAXIS:  [x] Venodynes                                [x] Heparin/Lovenox      DISPOSITION:   -SDU, transfer to stroke neurology Dr. Felder   -Neurosurgery to sign off, please reconsult as needed  -Discussed with Dr. Tejeda and Dr. Car

## 2019-10-13 NOTE — PROGRESS NOTE ADULT - SUBJECTIVE AND OBJECTIVE BOX
Transfer note: [facility] (Dr. Oro) to [facility] (Dr. Felder)     HPI:  63F with a h/o asthma, thyroid cancer in the past, intracavernous tumor/meningioma, and recently dx large acute sinus sagittal thrombosis on Lovenox x 1 week (followed by Dr. Tejeda) who p/w worsening headache tonight. She has been experiencing HAs x 3 months, left supraorbital region, previous they were controlled y NSAIDs, but since she has been dx with thrombosis on lovenox she cannot take NSAIDs. She has been taking Tylenol and Fioricet but these are not really helping. Pt woke up two hours PTA with severe exacerbation of her headache on the left side. No vision or speech change, no n/v, no f/c, no neck pain, no trauma or fall.  Patient was placed on Lovenox by Dr. Felder.  she is being admitted to get a MRV of brain.  Patient states that she quit smoking x 1 week due to severe headache. She is not using nicotine patch. (09 Oct 2019 04:30)    Hospital Course:   10/10: HD#2 RAHUL overnight, persistent headache, started Diamox/SQL/Decadr as per [facility], MRV/A Nova head - penidng, Exam unchanged since yesterday  10/11: HD#3 Imaging suggestive of progressive thrombosis, headaches continue.  Neuro exam intact.  10/12: HD#4 RAHUL overnight. Repeat imaging today, CTH/CTA/CTV and MRI. Remains on heparin drip, ptt goal 59-99. Continue meds for pain and n/v.  10/13 HD#5 RAHUL overnight, Neuro exam unchanged since yesterday, Dr. Martin for Plts Hep gtt PTT goal 59-99, transfer to stroke neurology for further management       SUBJECTIVE / INTERVAL HPI: Patient seen and examined at bedside prior to arrival on [facility] with no new complaints, Patient endorses paresthesias on sole of foot described as pins and needles occurring intermittently for last two days. Reports improvement of headache, says it no longer last whole day and will occur mainly in the morning and resolve by afternoon, currently with no headache. Denied headache, nausea, blurry vision, fatigue, vomiting, CP, SOB, urinary incontinence fevers, chills.     Review of systems negative except as noted above.     VITAL SIGNS:  Vital Signs Last 24 Hrs  T(C): 36.9 (13 Oct 2019 14:31), Max: 36.9 (12 Oct 2019 18:05)  T(F): 98.4 (13 Oct 2019 14:31), Max: 98.5 (12 Oct 2019 18:05)  HR: 52 (13 Oct 2019 15:00) (36 - 52)  BP: 113/66 (13 Oct 2019 15:00) (83/62 - 144/60)  BP(mean): 81 (13 Oct 2019 15:00) (65 - 97)  RR: 28 (13 Oct 2019 15:00) (15 - 34)  SpO2: 94% (13 Oct 2019 15:00) (92% - 96%)      10-12-19 @ 07:01  -  10-13-19 @ 07:00  --------------------------------------------------------  IN: 4067 mL / OUT: 3550 mL / NET: 517 mL    10-13-19 @ 07:01  -  10-13-19 @ 16:13  --------------------------------------------------------  IN: 1276 mL / OUT: 1100 mL / NET: 176 mL      PHYSICAL EXAM:  General: WDWN, resting comfortably on bed at time of exam in NAD  HEENT: NC/AT, anicteric sclera  Neck: -JVD  Cardiovascular: +S1/S2; RRR  Respiratory: CTA B/L; no W/R/R  Gastrointestinal: soft, NT/ND; +BS  Extremities: WWP; no edema,   Vascular: 2+ radial, DP pulses B/L  - Mental Status:  AAOx3;   - Cranial Nerves II-XII:    II:  PERRLA; visual fields are full to confrontation  III, IV, VI:  EOMI, no nystagmus  V:  facial sensation is intact in the V1-V3 distribution bilaterally.  VII:  face is symmetric with normal eye closure and smile  VIII:  hearing is intact to finger rub  IX, X:  uvula is midline and soft palate rises symmetrically  XI:  head turning and shoulder shrug are intact bilaterally  XII:  tongue protrudes in the midline  - Motor:  strength is 5/5 throughout; normal muscle bulk and tone throughout; no pronator drift  - Sensory:  intact to light touch,   - Coordination:  finger-nose-finger intact, no pronator drift    MEDICATIONS:  MEDICATIONS  (STANDING):  acetazolamide    Tablet 250 milliGRAM(s) Oral every 12 hours  aspirin enteric coated 81 milliGRAM(s) Oral daily  dexamethasone  Injectable   IV Push   dexamethasone  Injectable 2 milliGRAM(s) IV Push every 6 hours  docusate sodium 100 milliGRAM(s) Oral three times a day  heparin  Infusion 950 Unit(s)/Hr (9.5 mL/Hr) IV Continuous <Continuous>  influenza   Vaccine 0.5 milliLiter(s) IntraMuscular once  levothyroxine 200 MICROGram(s) Oral daily  polyethylene glycol 3350 17 Gram(s) Oral daily  sodium chloride 0.9%. 1000 milliLiter(s) (150 mL/Hr) IV Continuous <Continuous>    MEDICATIONS  (PRN):  acetaminophen   Tablet .. 325 milliGRAM(s) Oral every 4 hours PRN Moderate Pain (4 - 6)  acetaminophen 300 mG/butalbital 50 mG/ caffeine 40 mG 1 Capsule(s) Oral every 4 hours PRN headache  bisacodyl Suppository 10 milliGRAM(s) Rectal daily PRN Constipation  ondansetron Injectable 4 milliGRAM(s) IV Push every 6 hours PRN Nausea and/or Vomiting  oxyCODONE    IR 5 milliGRAM(s) Oral every 4 hours PRN Moderate Pain (4 - 6)  traMADol 25 milliGRAM(s) Oral every 4 hours PRN Mild Pain (1 - 3)      ALLERGIES:  Allergies    penicillin (Hives)    Intolerances        LABS:                        11.8   13.22 )-----------( 522      ( 13 Oct 2019 07:18 )             38.5     10-13    136  |  111<H>  |  16  ----------------------------<  113<H>  3.5   |  17<L>  |  0.77    Ca    8.1<L>      13 Oct 2019 08:43  Phos  3.7     10-13  Mg     1.8     10-13      PT/INR - ( 12 Oct 2019 21:50 )   PT: 11.3 sec;   INR: 1.00          PTT - ( 13 Oct 2019 08:43 )  PTT:87.9 sec    CAPILLARY BLOOD GLUCOSE          < from: MR Head No Cont (10.12.19 @ 13:09) >  FINDINGS: The MRI examination of the brain demonstrates the ventricles,   cisternal spaces, and cortical sulci to be appropriate for the patient's   stated age. There is no midline shift or extra-axial collection. There   remains a few punctate foci of increased signal within the   periventricular and subcortical white matter which is nonspecific and may   represent the sequela of small vessel ischemic disease in this patient.   The diffusion-weighted images demonstrate no acute ischemia. The GRE   images demonstrate no hemorrhagic products. There is hyperintense FLAIR   signal within the superior sagittal sinus which correlates with patient's   known sinus thrombosis.     There is a isointense soft tissue mass involving the left cavernous   sinuses with extension along the left middle cranial fossa with narrowing   of the left internal carotid artery flow void. The left carotid flow void   is attenuated with hyperintense signal with the petrous and cavernous   segments. These findings are compatible with the patient's underlying   cavernous sinuses meningioma.    IMPRESSION: No acute ischemia. Stable FLAIR signal abnormality within the   superior sagittal sinu    < end of copied text >    < from: MR Venogram Head No Cont (10.10.19 @ 22:53) >    IMPRESSION: Severe narrowing of the visualized intracranial cervical   internal carotid artery with area of occlusion in the mid segment. Severe   narrowing of the proximal and mid petrous segment with occluded distal   petrous and cavernous segment with reconstitution of the supraclinoid   segment.  Persistent fetal origin of the right posterior cerebral artery.  Hypoplastic left A1 segment. Mild narrowing of the left A2 segment. Left   M1 segment is unremarkable. Mild narrowing of the left M2 and M3 segments   compared to the right. No evidence of restricted diffusion to suggest an   acute infarct. Previously seen cavernous sinus meningioma is difficult to   evaluate on this noncontrast study. No midline shift. No hemorrhage.    < end of copied text >  < from: MR Venogram Head No Cont (10.10.19 @ 22:53) >  MPRESSION:  No acute intracranial abnormality.  Mild small vessel ischemic changes.  Meningioma seen in the cavernous sinus on the previous study is not seen   on this noncontrast study. Sinus disease.    < end of copied text >

## 2019-10-13 NOTE — PROGRESS NOTE ADULT - SUBJECTIVE AND OBJECTIVE BOX
***Neurocritical care attending note***    63/F with intracavernous tumopr/meningioma and recent diagnosis of 3 months of headaches found to have acute sinus thrombosis put on lovenox for 1 week as outpatient, now admitted with worse headaches.  Quit smoking 1 week ago. started on diamox this hospitalization.  MRV read yesterday appeared to show worsening clot burden, transitioned to heparin drip as a more established anticoagulant for this type of thrombosis.  Neurologically doing well, but brought to ICU for closer neurologic monitoring.   Endorses signifciant nausea and vomiting after starting PCA, however it did help her HA significantly.    PMH: hx thyroid cancer, asthma    10/12: nausea and pain moderately improved this morning.   24h events: Repeat imaging stable.      Exam:   Gen: middle aged woman appears uncomfortable  CV: RRR  Pulm: CTA b/l  Abd: soft NTND    MSE: awake, alert, oriented x3, follows multistep commands, attention normal, language fluent with intact naming  CN: LYDIA, EOMI, VFF, face symmetric, TUP midline, no dysarthria  Motor: No pronator drift full strength to confrontation in all extremities  Sensory: intact to LT throughout       Allergies: penicillin (Hives)          VITALS:  T(C): 36.4 (10-13-19 @ 09:40), Max: 36.9 (10-12-19 @ 18:05)  HR: 46 (10-13-19 @ 11:00) (36 - 52)  BP: 115/60 (10-13-19 @ 11:00) (103/59 - 144/60)  RR: 27 (10-13-19 @ 11:00) (13 - 31)  SpO2: 95% (10-13-19 @ 11:00) (92% - 98%)    EXAM:        MEDICATIONS:  acetaminophen   Tablet .. 325 milliGRAM(s) Oral every 4 hours PRN  acetaminophen 300 mG/butalbital 50 mG/ caffeine 40 mG 1 Capsule(s) Oral every 4 hours PRN  acetazolamide    Tablet 250 milliGRAM(s) Oral every 12 hours  aspirin enteric coated 81 milliGRAM(s) Oral daily  bisacodyl Suppository 10 milliGRAM(s) Rectal daily PRN  dexamethasone  Injectable   IV Push   dexamethasone  Injectable 4 milliGRAM(s) IV Push every 6 hours  dexamethasone  Injectable 2 milliGRAM(s) IV Push every 6 hours  docusate sodium 100 milliGRAM(s) Oral three times a day  heparin  Infusion 950 Unit(s)/Hr IV Continuous <Continuous>  HYDROmorphone PCA (1 mG/mL) 30 milliLiter(s) PCA Continuous PCA Continuous  influenza   Vaccine 0.5 milliLiter(s) IntraMuscular once  levothyroxine 200 MICROGram(s) Oral daily  ondansetron Injectable 4 milliGRAM(s) IV Push every 6 hours PRN  polyethylene glycol 3350 17 Gram(s) Oral daily  potassium chloride    Tablet ER 40 milliEquivalent(s) Oral every 4 hours  sodium chloride 0.9%. 1000 milliLiter(s) IV Continuous <Continuous>  traMADol 25 milliGRAM(s) Oral every 4 hours PRN      I/O's    10-12-19 @ 07:01  -  10-13-19 @ 07:00  --------------------------------------------------------  IN: 4067 mL / OUT: 3550 mL / NET: 517 mL    10-13-19 @ 07:01  -  10-13-19 @ 11:20  --------------------------------------------------------  IN: 638 mL / OUT: 650 mL / NET: -12 mL        Ventilator data:        LABS:                          11.8   13.22 )-----------( 522      ( 13 Oct 2019 07:18 )             38.5     10-13    136  |  111<H>  |  16  ----------------------------<  113<H>  3.5   |  17<L>  |  0.77    Ca    8.1<L>      13 Oct 2019 08:43  Phos  3.7     10-13  Mg     1.8     10-13      PT/INR - ( 12 Oct 2019 21:50 )   PT: 11.3 sec;   INR: 1.00          PTT - ( 13 Oct 2019 08:43 )  PTT:87.9 sec        CAPILLARY BLOOD GLUCOSE

## 2019-10-13 NOTE — PROGRESS NOTE ADULT - ASSESSMENT
63 female with known cavernous meningioma being treated for sagittal sinus trombosis with severe headache

## 2019-10-13 NOTE — PROGRESS NOTE ADULT - SUBJECTIVE AND OBJECTIVE BOX
HPI:  63F with a h/o asthma, thyroid cancer in the past, intracavernous tumor/meningioma, and recently dx large acute sinus sagittal thrombosis on Lovenox x 1 week (followed by Dr. Tejeda) who p/w worsening headache tonight. She has been experiencing HAs x 3 months, left supraorbital region, previous they were controlled y NSAIDs, but since she has been dx with thrombosis on lovenox she cannot take NSAIDs. She has been taking Tylenol and Fioricet but these are not really helping. Pt woke up two hours PTA with severe exacerbation of her headache on the left side. No vision or speech change, no n/v, no f/c, no neck pain, no trauma or fall.  Patient was placed on Lovenox by Dr. Felder.  she is being admitted to get a MRV of brain.  Patient states that she quit smoking x 1 week due to severe headache. She is not using nicotine patch. (09 Oct 2019 04:30)        Hospital Course:   10/10: HD#2 [facility] overnight, persistent headache, started Diamox/SQL/Decadr as per [facility], MRV/A Nova head - penidng, Exam unchanged since yesterday  10/11: HD#3 Imaging suggestive of progressive thrombosis, headaches continue.  Neuro exam intact.  10/12: HD#4 RAHUL overnight. Repeat imaging today, CTH/CTA/CTV and MRI. Remains on heparin drip, ptt goal 59-99. Continue meds for pain and n/v.  10/13 HD#5 RAHUL overnight, Neuro exam unchanged since yesterday, Dr. Martin for Plts Hep gtt PTT goal 59-99,     ICU Vital Signs Last 24 Hrs  T(C): 36.9 (12 Oct 2019 22:11), Max: 36.9 (12 Oct 2019 18:05)  T(F): 98.5 (12 Oct 2019 22:11), Max: 98.5 (12 Oct 2019 18:05)  HR: 38 (12 Oct 2019 23:00) (36 - 56)  BP: 111/56 (12 Oct 2019 23:00) (102/46 - 128/54)  BP(mean): 79 (12 Oct 2019 23:00) (62 - 92)  ABP: --  ABP(mean): --  RR: 16 (12 Oct 2019 23:00) (12 - 36)  SpO2: 94% (12 Oct 2019 23:00) (93% - 98%)      PHYSICAL EXAM:  Neurological:  Awake and alert, oriented x3, NAD, coherent speech, following commands  PERRL, EOMI, face symmetric  MAEx4, strength 5/5 UE and LE, ?mild LUE drift?  SILT throughout      TUBES/LINES:  [] CVC  [] A-line  [] Lumbar Drain  [] Ventriculostomy  [] PEDRO  [] Nunez  [] NGT   [] Other    DIET:  [] NPO  [x] Mechanical  [] Tube feeds    LABS:         Labs to be reviewed in AM      CAPILLARY BLOOD GLUCOSE          Drug Levels: [] N/A    CSF Analysis: [] N/A      Allergies    penicillin (Hives)    Intolerances        Home Medications:  Lovenox 100 mg/mL injectable solution: injectable once a day (09 Oct 2019 04:36)  Synthroid 200 mcg (0.2 mg) oral tablet: 1 tab(s) orally once a day (09 Oct 2019 04:36)      MEDICATIONS:  Antibiotics:    Neuro:  acetaminophen   Tablet .. 325 milliGRAM(s) Oral every 4 hours PRN  acetaminophen 300 mG/butalbital 50 mG/ caffeine 40 mG 1 Capsule(s) Oral every 4 hours PRN  HYDROmorphone PCA (1 mG/mL) 30 milliLiter(s) PCA Continuous PCA Continuous  ondansetron Injectable 4 milliGRAM(s) IV Push every 6 hours PRN  traMADol 25 milliGRAM(s) Oral every 4 hours PRN    Anticoagulation:  aspirin enteric coated 81 milliGRAM(s) Oral daily  heparin  Infusion 950 Unit(s)/Hr IV Continuous <Continuous>    OTHER:  acetazolamide    Tablet 250 milliGRAM(s) Oral every 12 hours  bisacodyl Suppository 10 milliGRAM(s) Rectal daily PRN  dexamethasone  Injectable   IV Push   dexamethasone  Injectable 4 milliGRAM(s) IV Push every 6 hours  docusate sodium 100 milliGRAM(s) Oral three times a day  influenza   Vaccine 0.5 milliLiter(s) IntraMuscular once  levothyroxine 200 MICROGram(s) Oral daily  polyethylene glycol 3350 17 Gram(s) Oral daily    IVF:  sodium chloride 0.9%. 1000 milliLiter(s) IV Continuous <Continuous>    CULTURES:    RADIOLOGY & ADDITIONAL TESTS:      ASSESSMENT:  63 female with known cavernous meningioma being treated for sagittal sinus trombosis with severe headache    HEADACHE  Handoff  MEWS Score  Thyroid carcinoma  Asthma  Thrombosis, superior sagittal sinus  Brain tumor  Headache  Thrombocytosis  Thyroid carcinoma  Brain tumor  Thrombosis, superior sagittal sinus  H/O thyroidectomy  PALPATATIONS      PLAN:  NEURO:  -neuro checks  -headache control  -nausea control  -f/u official imaging reads today- CTH/CTA/CTV, MRI  -continue heparin gtt for sinus thrombosis- PTT goal (full AC goal) 59-99  -continue ASA 81  - Cont tapering decadron taper x 3 days    CARDIOVASCULAR:  -SBP goal normotensive     PULMONARY:  -room air, satting well     RENAL:  -NS@150cc/hr  -voiding well     GI:  -regular diet  -bowel regimen  -GI ppx    HEME:  -Dr. Martin following  -continue hep gtt PTT goal 59-99  -trend platelets  -f/u [place] 2    ID:  -afebrile  - leukocytosis most likely due to Decadron, cont trend WBC    ENDO:  -ISS    DVT PROPHYLAXIS:  [x] Venodynes                                [x] Heparin/Lovenox      DISPOSITION:   -ICU status   -Full code  -PT/OT pending   -Discussed with Dr. Tejeda and Dr. Car     Assessment:  Present when checked    []  GCS  E   V  M     Heart Failure: []Acute, [] acute on chronic , []chronic  Heart Failure:  [] Diastolic (HFpEF), [] Systolic (HFrEF), []Combined (HFpEF and HFrEF), [] RHF, [] Pulm HTN, [] Other    [] ARIEL, [] ATN, [] AIN, [] other  [] CKD1, [] CKD2, [] CKD 3, [] CKD 4, [] CKD 5, []ESRD    Encephalopathy: [] Metabolic, [] Hepatic, [] toxic, [] Neurological, [] Other    Abnormal Nurtitional Status: [] malnurtition (see nutrition note), [ ]underweight: BMI < 19, [] morbid obesity: BMI >40, [] Cachexia    [] Sepsis  [] hypovolemic shock,[] cardiogenic shock, [] hemorrhagic shock, [] neuogenic shock  [] Acute Respiratory Failure  []Cerebral edema, [] Brain compression/ herniation,   [] Functional quadriplegia  [] Acute blood loss anemia

## 2019-10-13 NOTE — PROGRESS NOTE ADULT - PROBLEM SELECTOR PLAN 7
Remote history of asthma, patient with no recent exacerbations, does not currently require rescue inhaler. Hx of thyroid cancer now euthyroid  -c/w synthroid 200mcg

## 2019-10-13 NOTE — PROGRESS NOTE ADULT - PROBLEM SELECTOR PLAN 1
Patient with progressively worsening headache x3 months with recent diagnosis as outpatient of sinus cavernous thrombosis, started on lovenox as outpatient with imaging as above showing progression of venous sinus thrombosis now on hep gtt.  -c/w hep gtt 9.5cc/hr with goal for PTT 58-99  -now with three therapeutic PTTs, am PTT  -c/w asa 81  -on decadron taper to finish 10/15  -Diamox 250mg BID for potential benefit of decreased CSF production  -will repeat head imaging for progression of thrombosis after therapeutic on heparin

## 2019-10-13 NOTE — PROGRESS NOTE ADULT - ASSESSMENT
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  Neurologically intact only with headaches, but brought to ICU for closer neurologic monitoring.   Endorses significant nausea and vomiting after starting PCA, however it did help her HA significantly.    Plan  Neuro  Neurochecks q1h  -defer repeat imaging until likely angiogram monday, d/w dr silver  -start ASA 81  -heparin drip full anticoagulation (PTT goal 58-99) - follow nomogram for adjustments  -pain control: dilaudid PCA, trial reglan 10mg x1 --> transition to oxycodone PRN and tramadol PRN  -if remains neurologically intact, may not need additional imaging for at least a few days    Heme) 1) c/f essential thrombocytosis however trombocytosis was not present on outpatient labs before 2018, may be reactive, follow up JAK2 testing  -pending heme laboratory workup for ET and other hypergoaguable states, appreciate reccs  -DVT ppx: on full dose AC  -q6h PTT  -on ASA for possible essential thrombocytosis, alpa valles    GI:regular diet  -bowel regimen    Renal: IVF's  -NS at 150/hr for good hydration with CST    Access: PIV's    Sylvester Car MD  Attending Neurointensivist

## 2019-10-13 NOTE — PROGRESS NOTE ADULT - PROBLEM SELECTOR PLAN 8
DVT ppx: on hep gtt  F: LR 150cc/hr  E: Replete lytes to K>4 Mg >2  N: Remote history of asthma, patient with no recent exacerbations, does not currently require rescue inhaler.

## 2019-10-14 DIAGNOSIS — D72.828 OTHER ELEVATED WHITE BLOOD CELL COUNT: ICD-10-CM

## 2019-10-14 DIAGNOSIS — G44.89 OTHER HEADACHE SYNDROME: ICD-10-CM

## 2019-10-14 DIAGNOSIS — F17.200 NICOTINE DEPENDENCE, UNSPECIFIED, UNCOMPLICATED: ICD-10-CM

## 2019-10-14 DIAGNOSIS — E66.3 OVERWEIGHT: ICD-10-CM

## 2019-10-14 DIAGNOSIS — J45.20 MILD INTERMITTENT ASTHMA, UNCOMPLICATED: ICD-10-CM

## 2019-10-14 LAB
ANION GAP SERPL CALC-SCNC: 10 MMOL/L — SIGNIFICANT CHANGE UP (ref 5–17)
APTT BLD: 59.4 SEC — HIGH (ref 27.5–36.3)
APTT BLD: 80.2 SEC — HIGH (ref 27.5–36.3)
BASOPHILS # BLD AUTO: 0 K/UL — SIGNIFICANT CHANGE UP (ref 0–0.2)
BASOPHILS NFR BLD AUTO: 0 % — SIGNIFICANT CHANGE UP (ref 0–2)
BUN SERPL-MCNC: 13 MG/DL — SIGNIFICANT CHANGE UP (ref 7–23)
CALCIUM SERPL-MCNC: 8.7 MG/DL — SIGNIFICANT CHANGE UP (ref 8.4–10.5)
CHLORIDE SERPL-SCNC: 112 MMOL/L — HIGH (ref 96–108)
CO2 SERPL-SCNC: 19 MMOL/L — LOW (ref 22–31)
CREAT SERPL-MCNC: 0.73 MG/DL — SIGNIFICANT CHANGE UP (ref 0.5–1.3)
EOSINOPHIL # BLD AUTO: 0 K/UL — SIGNIFICANT CHANGE UP (ref 0–0.5)
EOSINOPHIL NFR BLD AUTO: 0 % — SIGNIFICANT CHANGE UP (ref 0–6)
GLUCOSE SERPL-MCNC: 88 MG/DL — SIGNIFICANT CHANGE UP (ref 70–99)
HCT VFR BLD CALC: 38.9 % — SIGNIFICANT CHANGE UP (ref 34.5–45)
HGB BLD-MCNC: 11.9 G/DL — SIGNIFICANT CHANGE UP (ref 11.5–15.5)
INR BLD: 1.03 — SIGNIFICANT CHANGE UP (ref 0.88–1.16)
LYMPHOCYTES # BLD AUTO: 11.4 % — LOW (ref 13–44)
LYMPHOCYTES # BLD AUTO: 2.03 K/UL — SIGNIFICANT CHANGE UP (ref 1–3.3)
MAGNESIUM SERPL-MCNC: 1.9 MG/DL — SIGNIFICANT CHANGE UP (ref 1.6–2.6)
MCHC RBC-ENTMCNC: 28.5 PG — SIGNIFICANT CHANGE UP (ref 27–34)
MCHC RBC-ENTMCNC: 30.6 GM/DL — LOW (ref 32–36)
MCV RBC AUTO: 93.3 FL — SIGNIFICANT CHANGE UP (ref 80–100)
MONOCYTES # BLD AUTO: 0.94 K/UL — HIGH (ref 0–0.9)
MONOCYTES NFR BLD AUTO: 5.3 % — SIGNIFICANT CHANGE UP (ref 2–14)
NEUTROPHILS # BLD AUTO: 14.66 K/UL — HIGH (ref 1.8–7.4)
NEUTROPHILS NFR BLD AUTO: 82.4 % — HIGH (ref 43–77)
NRBC # BLD: 0 /100 WBCS — SIGNIFICANT CHANGE UP (ref 0–0)
PHOSPHATE SERPL-MCNC: 2.6 MG/DL — SIGNIFICANT CHANGE UP (ref 2.5–4.5)
PLATELET # BLD AUTO: 562 K/UL — HIGH (ref 150–400)
POTASSIUM SERPL-MCNC: 3.7 MMOL/L — SIGNIFICANT CHANGE UP (ref 3.5–5.3)
POTASSIUM SERPL-SCNC: 3.7 MMOL/L — SIGNIFICANT CHANGE UP (ref 3.5–5.3)
PROTHROM AB SERPL-ACNC: 11.6 SEC — SIGNIFICANT CHANGE UP (ref 10–12.9)
RBC # BLD: 4.17 M/UL — SIGNIFICANT CHANGE UP (ref 3.8–5.2)
RBC # FLD: 14 % — SIGNIFICANT CHANGE UP (ref 10.3–14.5)
SODIUM SERPL-SCNC: 141 MMOL/L — SIGNIFICANT CHANGE UP (ref 135–145)
WBC # BLD: 17.79 K/UL — HIGH (ref 3.8–10.5)
WBC # FLD AUTO: 17.79 K/UL — HIGH (ref 3.8–10.5)

## 2019-10-14 PROCEDURE — 99233 SBSQ HOSP IP/OBS HIGH 50: CPT | Mod: 24

## 2019-10-14 PROCEDURE — 99233 SBSQ HOSP IP/OBS HIGH 50: CPT

## 2019-10-14 PROCEDURE — 71045 X-RAY EXAM CHEST 1 VIEW: CPT | Mod: 26

## 2019-10-14 PROCEDURE — 99255 IP/OBS CONSLTJ NEW/EST HI 80: CPT | Mod: GC

## 2019-10-14 PROCEDURE — 99232 SBSQ HOSP IP/OBS MODERATE 35: CPT

## 2019-10-14 RX ORDER — WARFARIN SODIUM 2.5 MG/1
10 TABLET ORAL ONCE
Refills: 0 | Status: COMPLETED | OUTPATIENT
Start: 2019-10-14 | End: 2019-10-14

## 2019-10-14 RX ORDER — MAGNESIUM SULFATE 500 MG/ML
1 VIAL (ML) INJECTION ONCE
Refills: 0 | Status: COMPLETED | OUTPATIENT
Start: 2019-10-14 | End: 2019-10-14

## 2019-10-14 RX ORDER — WARFARIN SODIUM 2.5 MG/1
5 TABLET ORAL ONCE
Refills: 0 | Status: DISCONTINUED | OUTPATIENT
Start: 2019-10-14 | End: 2019-10-14

## 2019-10-14 RX ORDER — POTASSIUM CHLORIDE 20 MEQ
40 PACKET (EA) ORAL ONCE
Refills: 0 | Status: COMPLETED | OUTPATIENT
Start: 2019-10-14 | End: 2019-10-14

## 2019-10-14 RX ADMIN — POLYETHYLENE GLYCOL 3350 17 GRAM(S): 17 POWDER, FOR SOLUTION ORAL at 11:59

## 2019-10-14 RX ADMIN — Medication 100 MILLIGRAM(S): at 22:23

## 2019-10-14 RX ADMIN — Medication 1 MILLIGRAM(S): at 23:48

## 2019-10-14 RX ADMIN — ACETAZOLAMIDE 250 MILLIGRAM(S): 250 TABLET ORAL at 05:57

## 2019-10-14 RX ADMIN — WARFARIN SODIUM 10 MILLIGRAM(S): 2.5 TABLET ORAL at 22:23

## 2019-10-14 RX ADMIN — HEPARIN SODIUM 9.5 UNIT(S)/HR: 5000 INJECTION INTRAVENOUS; SUBCUTANEOUS at 17:30

## 2019-10-14 RX ADMIN — SODIUM CHLORIDE 150 MILLILITER(S): 9 INJECTION, SOLUTION INTRAVENOUS at 06:49

## 2019-10-14 RX ADMIN — Medication 100 GRAM(S): at 08:41

## 2019-10-14 RX ADMIN — ACETAZOLAMIDE 250 MILLIGRAM(S): 250 TABLET ORAL at 18:19

## 2019-10-14 RX ADMIN — SODIUM CHLORIDE 150 MILLILITER(S): 9 INJECTION, SOLUTION INTRAVENOUS at 23:15

## 2019-10-14 RX ADMIN — Medication 81 MILLIGRAM(S): at 11:59

## 2019-10-14 RX ADMIN — Medication 100 MILLIGRAM(S): at 05:58

## 2019-10-14 RX ADMIN — Medication 2 MILLIGRAM(S): at 05:58

## 2019-10-14 RX ADMIN — Medication 40 MILLIEQUIVALENT(S): at 08:41

## 2019-10-14 RX ADMIN — Medication 1 MILLIGRAM(S): at 18:20

## 2019-10-14 RX ADMIN — Medication 100 MILLIGRAM(S): at 13:40

## 2019-10-14 RX ADMIN — Medication 2 MILLIGRAM(S): at 11:59

## 2019-10-14 RX ADMIN — Medication 200 MICROGRAM(S): at 05:58

## 2019-10-14 NOTE — CONSULT NOTE ADULT - ASSESSMENT
63F with a h/o asthma, thyroid cancer in the past, intracavernous tumor/meningioma, and recently dx large acute sinus sagittal thrombosis, who p/w worsening intractable headache x 3 months. Limited bedside exam unrevealing for ophthalmologic etiology of headaches. No evidence of papilledema.  --Recommend outpatient eval for more thorough assessment of optic nerve after pt discharged  --Reconsult with visual changes/ events    Grace Woods MD
Hypercoagulable w/u done so far pt has double heterozygous MTHFR gene mutation but normal Homocystein...will add Prothrombin gene mutation test for AM
63 female with known cavernous meningioma being treated for sagittal sinus trombosis with severe headache

## 2019-10-14 NOTE — CONSULT NOTE ADULT - SUBJECTIVE AND OBJECTIVE BOX
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 Tejeda) who p/w worsening headache on 10/9. Pt was started on heparin gtt given possible enlargement of thrombosis on Lovenox.     Allergies    penicillin (Hives)    Intolerances    MEDICATIONS  (STANDING):  acetazolamide    Tablet 250 milliGRAM(s) Oral every 12 hours  aspirin enteric coated 81 milliGRAM(s) Oral daily  dexamethasone  Injectable   IV Push   dexamethasone  Injectable 2 milliGRAM(s) IV Push every 6 hours  dexamethasone  Injectable 1 milliGRAM(s) IV Push every 6 hours  docusate sodium 100 milliGRAM(s) Oral three times a day  heparin  Infusion 950 Unit(s)/Hr (9.5 mL/Hr) IV Continuous <Continuous>  influenza   Vaccine 0.5 milliLiter(s) IntraMuscular once  lactated ringers. 1000 milliLiter(s) (150 mL/Hr) IV Continuous <Continuous>  levothyroxine 200 MICROGram(s) Oral daily  magnesium sulfate  IVPB 1 Gram(s) IV Intermittent once  polyethylene glycol 3350 17 Gram(s) Oral daily  potassium chloride    Tablet ER 40 milliEquivalent(s) Oral once    MEDICATIONS  (PRN):  acetaminophen   Tablet .. 325 milliGRAM(s) Oral every 4 hours PRN Moderate Pain (4 - 6)  acetaminophen 300 mG/butalbital 50 mG/ caffeine 40 mG 1 Capsule(s) Oral every 4 hours PRN headache  bisacodyl Suppository 10 milliGRAM(s) Rectal daily PRN Constipation  ondansetron Injectable 4 milliGRAM(s) IV Push every 6 hours PRN Nausea and/or Vomiting  oxyCODONE    IR 5 milliGRAM(s) Oral every 4 hours PRN Moderate Pain (4 - 6)  traMADol 25 milliGRAM(s) Oral every 4 hours PRN Mild Pain (1 - 3)      Drug Dosing Weight  Height (cm): 160.02 (08 Oct 2019 22:41)  Weight (kg): 64.9 (08 Oct 2019 22:41)  BMI (kg/m2): 25.3 (08 Oct 2019 22:41)  BSA (m2): 1.68 (08 Oct 2019 22:41)    PAST MEDICAL & SURGICAL HISTORY:  Thyroid carcinoma  Asthma  Thrombosis, superior sagittal sinus  Brain tumor: meningioma  H/O thyroidectomy    FAMILY HISTORY:    SOCIAL HISTORY:    Vital Signs Last 24 Hrs  T(C): 36.7 (14 Oct 2019 06:00), Max: 37.2 (13 Oct 2019 18:00)  T(F): 98 (14 Oct 2019 06:00), Max: 99 (13 Oct 2019 18:00)  HR: 54 (14 Oct 2019 05:00) (40 - 78)  BP: 127/61 (14 Oct 2019 05:00) (83/62 - 137/54)  BP(mean): 88 (14 Oct 2019 05:00) (72 - 95)  ABP: --  ABP(mean): --  RR: 18 (14 Oct 2019 05:00) (17 - 34)  SpO2: 93% (14 Oct 2019 05:00) (92% - 97%)    I&O's Detail    13 Oct 2019 07:01  -  14 Oct 2019 07:00  --------------------------------------------------------  IN:    heparin Infusion: 313.5 mL    lactated ringers.: 2100 mL    sodium chloride 0.9%: 1350 mL  Total IN: 3763.5 mL    OUT:    Voided: 5400 mL  Total OUT: 5400 mL    Total NET: -1636.5 mL          PHYSICAL EXAM:      Constitutional:    Eyes:    ENMT:    Neck:    Breasts:    Back:    Respiratory:    Cardiovascular:    Gastrointestinal:    Genitourinary:    Rectal:    Extremities:    Vascular:    Neurological:    Skin:    Lymph Nodes:    Musculoskeletal:    Psychiatric:        LABS:  CBC Full  -  ( 14 Oct 2019 07:17 )  WBC Count : 17.79 K/uL  RBC Count : 4.17 M/uL  Hemoglobin : 11.9 g/dL  Hematocrit : 38.9 %  Platelet Count - Automated : 562 K/uL  Mean Cell Volume : 93.3 fl  Mean Cell Hemoglobin : 28.5 pg  Mean Cell Hemoglobin Concentration : 30.6 gm/dL  Auto Neutrophil # : x  Auto Lymphocyte # : x  Auto Monocyte # : x  Auto Eosinophil # : x  Auto Basophil # : x  Auto Neutrophil % : x  Auto Lymphocyte % : x  Auto Monocyte % : x  Auto Eosinophil % : x  Auto Basophil % : x    10-14    141  |  112<H>  |  13  ----------------------------<  88  3.7   |  19<L>  |  0.73    Ca    8.7      14 Oct 2019 07:17  Phos  2.6     10-14  Mg     1.9     10-14      CAPILLARY BLOOD GLUCOSE        PT/INR - ( 12 Oct 2019 21:50 )   PT: 11.3 sec;   INR: 1.00          PTT - ( 14 Oct 2019 07:17 )  PTT:80.2 sec      EKG:  Sinus bradycardia    ECHO, US:  US duplex:  No vein thrombosis seen.    RADIOLOGY:  CT head w/o contrast:  No intracranial hemorrhage. Stable exam.    MRI brain:  The MRI examination of the brain demonstrates the ventricles, cisternal spaces, and cortical sulci to be appropriate for the patient's stated age. There is no midline shift or extra-axial collection. There remains a few punctate foci of increased signal within the periventricular and subcortical white matter which is nonspecific and may represent the sequela of small vessel ischemic disease in this patient. The diffusion-weighted images demonstrate no acute ischemia. The GRE images demonstrate no hemorrhagic products. There is hyperintense FLAIR   signal within the superior sagittal sinus which correlates with patient's known sinus thrombosis. There is a isointense soft tissue mass involving the left cavernous sinuses with extension along the left middle cranial fossa with narrowing of the left internal carotid artery flow void. The left carotid flow void is attenuated with hyperintense signal with the petrous and cavernous   segments. These findings are compatible with the patient's underlying cavernous sinuses meningioma.    CTA head/neck:  Stable attenuated caliber to the distal left cervical internal carotid artery with focal area of high-grade stenosis/occlusion involving the left cavernous segment secondary to underlying cavernous sinus meningioma. Relatively stable thrombosis involving nearly the 2/3 of the superior sagittal sinus. Small filling defect within the underdeveloped distal right transverse sinus which may represent additional thrombus versus arachnoid granulation. 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 Tejeda) who p/w worsening headache on 10/9. Pt was started on heparin gtt given possible enlargement of thrombosis on Lovenox.  Now being bridged to warfarin    Subjective:  Pt has no acute complaints. Reports that her headache has significantly improved. Denies chest pain. 12 pt ROS is otherwise negative.    Allergies    penicillin (Hives)    Intolerances    MEDICATIONS  (STANDING):  acetazolamide    Tablet 250 milliGRAM(s) Oral every 12 hours  aspirin enteric coated 81 milliGRAM(s) Oral daily  dexamethasone  Injectable   IV Push   dexamethasone  Injectable 2 milliGRAM(s) IV Push every 6 hours  dexamethasone  Injectable 1 milliGRAM(s) IV Push every 6 hours  docusate sodium 100 milliGRAM(s) Oral three times a day  heparin  Infusion 950 Unit(s)/Hr (9.5 mL/Hr) IV Continuous <Continuous>  influenza   Vaccine 0.5 milliLiter(s) IntraMuscular once  lactated ringers. 1000 milliLiter(s) (150 mL/Hr) IV Continuous <Continuous>  levothyroxine 200 MICROGram(s) Oral daily  magnesium sulfate  IVPB 1 Gram(s) IV Intermittent once  polyethylene glycol 3350 17 Gram(s) Oral daily  potassium chloride    Tablet ER 40 milliEquivalent(s) Oral once    MEDICATIONS  (PRN):  acetaminophen   Tablet .. 325 milliGRAM(s) Oral every 4 hours PRN Moderate Pain (4 - 6)  acetaminophen 300 mG/butalbital 50 mG/ caffeine 40 mG 1 Capsule(s) Oral every 4 hours PRN headache  bisacodyl Suppository 10 milliGRAM(s) Rectal daily PRN Constipation  ondansetron Injectable 4 milliGRAM(s) IV Push every 6 hours PRN Nausea and/or Vomiting  oxyCODONE    IR 5 milliGRAM(s) Oral every 4 hours PRN Moderate Pain (4 - 6)  traMADol 25 milliGRAM(s) Oral every 4 hours PRN Mild Pain (1 - 3)      Drug Dosing Weight  Height (cm): 160.02 (08 Oct 2019 22:41)  Weight (kg): 64.9 (08 Oct 2019 22:41)  BMI (kg/m2): 25.3 (08 Oct 2019 22:41)  BSA (m2): 1.68 (08 Oct 2019 22:41)    PAST MEDICAL & SURGICAL HISTORY:  Thyroid carcinoma  Asthma  Thrombosis, superior sagittal sinus  Brain tumor: meningioma  H/O thyroidectomy    FAMILY HISTORY: no cardiac disease    SOCIAL HISTORY: quit smoking 1 week ago    Vital Signs Last 24 Hrs  T(C): 36.7 (14 Oct 2019 06:00), Max: 37.2 (13 Oct 2019 18:00)  T(F): 98 (14 Oct 2019 06:00), Max: 99 (13 Oct 2019 18:00)  HR: 54 (14 Oct 2019 05:00) (40 - 78)  BP: 127/61 (14 Oct 2019 05:00) (83/62 - 137/54)  BP(mean): 88 (14 Oct 2019 05:00) (72 - 95)  ABP: --  ABP(mean): --  RR: 18 (14 Oct 2019 05:00) (17 - 34)  SpO2: 93% (14 Oct 2019 05:00) (92% - 97%)    I&O's Detail    13 Oct 2019 07:01  -  14 Oct 2019 07:00  --------------------------------------------------------  IN:    heparin Infusion: 313.5 mL    lactated ringers.: 2100 mL    sodium chloride 0.9%: 1350 mL  Total IN: 3763.5 mL    OUT:    Voided: 5400 mL  Total OUT: 5400 mL    Total NET: -1636.5 mL    PHYSICAL EXAM:    Constitutional: NAD  Eyes: PERRLA  ENMT: MMM  Neck: supple  Back: midline  Respiratory: CTA b/l  Cardiovascular: rrr, s1s2, no m/r/g  Gastrointestinal: soft, NTND, + BS  Extremities: warm  Vascular: + 2 pulses radial, no edema  Neurological: AAO x 4, 5/5 strength in all extremities  Skin: no rash  Lymph Nodes: no LAD  Musculoskeletal: no joint swelling  Psychiatric: normal affect    LABS:  CBC Full  -  ( 14 Oct 2019 07:17 )  WBC Count : 17.79 K/uL  RBC Count : 4.17 M/uL  Hemoglobin : 11.9 g/dL  Hematocrit : 38.9 %  Platelet Count - Automated : 562 K/uL  Mean Cell Volume : 93.3 fl  Mean Cell Hemoglobin : 28.5 pg  Mean Cell Hemoglobin Concentration : 30.6 gm/dL  Auto Neutrophil # : x  Auto Lymphocyte # : x  Auto Monocyte # : x  Auto Eosinophil # : x  Auto Basophil # : x  Auto Neutrophil % : x  Auto Lymphocyte % : x  Auto Monocyte % : x  Auto Eosinophil % : x  Auto Basophil % : x    10-14    141  |  112<H>  |  13  ----------------------------<  88  3.7   |  19<L>  |  0.73    Ca    8.7      14 Oct 2019 07:17  Phos  2.6     10-14  Mg     1.9     10-14      CAPILLARY BLOOD GLUCOSE        PT/INR - ( 12 Oct 2019 21:50 )   PT: 11.3 sec;   INR: 1.00          PTT - ( 14 Oct 2019 07:17 )  PTT:80.2 sec      EKG:  Sinus bradycardia    ECHO, US:  US duplex:  No vein thrombosis seen.    RADIOLOGY:  CT head w/o contrast:  No intracranial hemorrhage. Stable exam.    MRI brain:  The MRI examination of the brain demonstrates the ventricles, cisternal spaces, and cortical sulci to be appropriate for the patient's stated age. There is no midline shift or extra-axial collection. There remains a few punctate foci of increased signal within the periventricular and subcortical white matter which is nonspecific and may represent the sequela of small vessel ischemic disease in this patient. The diffusion-weighted images demonstrate no acute ischemia. The GRE images demonstrate no hemorrhagic products. There is hyperintense FLAIR   signal within the superior sagittal sinus which correlates with patient's known sinus thrombosis. There is a isointense soft tissue mass involving the left cavernous sinuses with extension along the left middle cranial fossa with narrowing of the left internal carotid artery flow void. The left carotid flow void is attenuated with hyperintense signal with the petrous and cavernous   segments. These findings are compatible with the patient's underlying cavernous sinuses meningioma.    CTA head/neck:  Stable attenuated caliber to the distal left cervical internal carotid artery with focal area of high-grade stenosis/occlusion involving the left cavernous segment secondary to underlying cavernous sinus meningioma. Relatively stable thrombosis involving nearly the 2/3 of the superior sagittal sinus. Small filling defect within the underdeveloped distal right transverse sinus which may represent additional thrombus versus arachnoid granulation.

## 2019-10-14 NOTE — PROGRESS NOTE ADULT - SUBJECTIVE AND OBJECTIVE BOX
HEALTH ISSUES - PROBLEM Dx:  Smoking: Smoking  Other elevated white blood cell (WBC) count: Other elevated white blood cell (WBC) count  Overweight (BMI 25.0-29.9): Overweight (BMI 25.0-29.9)  Mild intermittent asthma without complication: Mild intermittent asthma without complication  Other headache syndrome: Other headache syndrome  Hypothyroid: Hypothyroid  Prophylactic measure: Prophylactic measure  Asthma: Asthma  Hyperchloremic metabolic acidosis: Hyperchloremic metabolic acidosis  Bradycardia: Bradycardia  Leukocytosis: Leukocytosis  Headache: Headache  Thrombocytosis: Thrombocytosis  Thyroid carcinoma: Thyroid carcinoma  Brain tumor: Brain tumor  Thrombosis, superior sagittal sinus: Thrombosis, superior sagittal sinus          CHEMOTHERAPY REGIMEN:        Day:                          Diet:  Protocol:                                    IVF:      MEDICATIONS  (STANDING):  acetazolamide    Tablet 250 milliGRAM(s) Oral every 12 hours  aspirin enteric coated 81 milliGRAM(s) Oral daily  dexamethasone  Injectable   IV Push   dexamethasone  Injectable 1 milliGRAM(s) IV Push every 6 hours  docusate sodium 100 milliGRAM(s) Oral three times a day  heparin  Infusion 950 Unit(s)/Hr (9.5 mL/Hr) IV Continuous <Continuous>  influenza   Vaccine 0.5 milliLiter(s) IntraMuscular once  lactated ringers. 1000 milliLiter(s) (150 mL/Hr) IV Continuous <Continuous>  levothyroxine 200 MICROGram(s) Oral daily  polyethylene glycol 3350 17 Gram(s) Oral daily    MEDICATIONS  (PRN):  acetaminophen   Tablet .. 325 milliGRAM(s) Oral every 4 hours PRN Moderate Pain (4 - 6)  acetaminophen 300 mG/butalbital 50 mG/ caffeine 40 mG 1 Capsule(s) Oral every 4 hours PRN headache  bisacodyl Suppository 10 milliGRAM(s) Rectal daily PRN Constipation  ondansetron Injectable 4 milliGRAM(s) IV Push every 6 hours PRN Nausea and/or Vomiting  oxyCODONE    IR 5 milliGRAM(s) Oral every 4 hours PRN Moderate Pain (4 - 6)  traMADol 25 milliGRAM(s) Oral every 4 hours PRN Mild Pain (1 - 3)      Allergies    penicillin (Hives)    Intolerances        DVT Prophylaxis: [ ] YES [ ] NO      Antibiotics: [ ] YES [ ] NO    Pain Scale (1-10):       Location:    Vital Signs Last 24 Hrs  T(C): 36.6 (14 Oct 2019 18:00), Max: 37.2 (14 Oct 2019 13:47)  T(F): 97.8 (14 Oct 2019 18:00), Max: 99 (14 Oct 2019 13:47)  HR: 62 (14 Oct 2019 16:00) (42 - 66)  BP: 106/79 (14 Oct 2019 16:00) (106/79 - 138/63)  BP(mean): 84 (14 Oct 2019 16:00) (84 - 95)  RR: 18 (14 Oct 2019 16:00) (18 - 95)  SpO2: 92% (14 Oct 2019 16:00) (92% - 98%)    Drug Dosing Weight  Height (cm): 160.02 (08 Oct 2019 22:41)  Weight (kg): 64.9 (08 Oct 2019 22:41)  BMI (kg/m2): 25.3 (08 Oct 2019 22:41)  BSA (m2): 1.68 (08 Oct 2019 22:41)     Physical Exam:  · Constitutional	detailed exam  · Constitutional Details	well-developed; well-groomed  · Eyes	EOMI; PERRL; no drainage or redness  · ENMT Comments	dry mucous membranes  · Respiratory	detailed exam  · Respiratory Comments	normal breath sounds at the lung bases bilaterally  · Cardiovascular	Regular rate & rhythm, normal S1, S2; no murmurs, gallops or rubs; no S3, S4  · Abd-Soft non tender  ·Ext-no edema, clubbing or cyanosis    URINARY CATHETER: [ ] YES [ ] NO     LABS:  CBC Full  -  ( 14 Oct 2019 07:17 )  WBC Count : 17.79 K/uL  RBC Count : 4.17 M/uL  Hemoglobin : 11.9 g/dL  Hematocrit : 38.9 %  Platelet Count - Automated : 562 K/uL  Mean Cell Volume : 93.3 fl  Mean Cell Hemoglobin : 28.5 pg  Mean Cell Hemoglobin Concentration : 30.6 gm/dL  Auto Neutrophil # : 14.66 K/uL  Auto Lymphocyte # : 2.03 K/uL  Auto Monocyte # : 0.94 K/uL  Auto Eosinophil # : 0.00 K/uL  Auto Basophil # : 0.00 K/uL  Auto Neutrophil % : 82.4 %  Auto Lymphocyte % : 11.4 %  Auto Monocyte % : 5.3 %  Auto Eosinophil % : 0.0 %  Auto Basophil % : 0.0 %    10-14    141  |  112<H>  |  13  ----------------------------<  88  3.7   |  19<L>  |  0.73    Ca    8.7      14 Oct 2019 07:17  Phos  2.6     10-14  Mg     1.9     10-14      PT/INR - ( 14 Oct 2019 14:38 )   PT: 11.6 sec;   INR: 1.03          PTT - ( 14 Oct 2019 14:38 )  PTT:59.4 sec      CULTURES:    RADIOLOGY & ADDITIONAL STUDIES:

## 2019-10-14 NOTE — CONSULT NOTE ADULT - PROBLEM SELECTOR RECOMMENDATION 9
Patient with progressively worsening headache x3 months with recent diagnosis as outpatient of sinus cavernous thrombosis, started on lovenox as outpatient with imaging as above showing progression of venous sinus thrombosis now on hep gtt.  -c/w hep gtt   -c/w asa 81  -on decadron taper to finish 10/15  -Diamox 250mg BID for potential benefit of decreased CSF production Patient afebrile throughout hospital course with persistent white count, no S&S of infectious etiology, no cough, ho hematuria, no rashes. likely 2/2 to steroids  -trend CBC.  -Heme also following

## 2019-10-14 NOTE — PROGRESS NOTE ADULT - PROBLEM SELECTOR PLAN 8
Remote history of asthma, patient with no recent exacerbations, does not currently require rescue inhaler.

## 2019-10-14 NOTE — PROGRESS NOTE ADULT - SUBJECTIVE AND OBJECTIVE BOX
=================================  NEUROCRITICAL CARE ATTENDING NOTE  =================================    DANTE GRAHAM   MRN-[id number]  Summary:  63y/F  with a h/o asthma, thyroid cancer in the past, intracavernous tumor/meningioma, and recently dx large acute sinus sagittal thrombosis on Lovenox x 1 week (followed by Dr. Tejeda) who p/w worsening headache tonight. She has been experiencing HAs x 3 months, left supraorbital region, previous they were controlled y NSAIDs, but since she has been dx with thrombosis on lovenox she cannot take NSAIDs. She has been taking Tylenol and Fioricet but these are not really helping. Pt woke up two hours PTA with severe exacerbation of her headache on the left side. No vision or speech change, no n/v, no f/c, no neck pain, no trauma or fall. Patient was placed on Lovenox by Dr. Felder. she is being admitted to get a MRV of brain. Patient states that she quit smoking x 1 week due to severe headache. She is not using nicotine patch. (09 Oct 2019 04:30)    COURSE IN THE HOSPITAL:  10/09 admitted to [facility]  10/13 stepdown to stroke  10/14 therapeutic on heparin gtt, coumadin started    Past Medical History: Thyroid carcinoma Asthma Thrombosis, superior sagittal sinus Brain tumor  Allergies:  penicillin (Hives)  Home meds:  Lovenox 100 mg/mL injectable solution: injectable once a day Synthroid 200 mcg (0.2 mg) oral tablet: 1 tab(s) orally once a day    PHYSICAL EXAMINATION  T(C): 37.2 (10-14 @ 13:47), Max: 37.2 (10-13 @ 18:00) HR: 62 (10-14 @ 16:00) (42 - 78) BP: 106/79 (10-14 @ 16:00) (106/79 - 138/63) RR: 18 (10-14 @ 16:00) (18 - 95)SpO2: 92% (10-14 @ 16:00) (92% - 98%)  NEUROLOGIC EXAMINATION:  Patient is awake, alert, fully oriented, pupils 2-3mm equal and briskly reactive to light, EOMs intact, muscle strength 5/5 on all 4s.  GENERAL: not intubated, not in cardiorespiratory distress  EENT:  anicteric  CARDIOVASCULAR: (+) S1 S2, normal rate and regular rhythm  PULMONARY: clear to auscultation bilaterally  ABDOMEN: soft, nontender with normoactive bowel sounds  EXTREMITIES: no edema  SKIN: no rash    LABS:             11.9   17.79 )-----------( 562      ( 14 Oct 2019 07:17 )             38.9     141  |  112<H>  |  13  ----------------------------<  88  3.7   |  19<L>  |  0.73    Ca    8.7      14 Oct 2019 07:17  Phos  2.6     10-14  Mg     1.9     10-14    INR TREND:  1.03 (10-14 @ 14:38)  1.00 (10-12 @ 21:50)  1.00 (10-11 @ 05:44)  1.01 (10-08 @ 23:24)    10-13 @ 07:01  -  10-14 @ 07:00  IN: 3763.5 mL / OUT: 5400 mL / NET: -1636.5 mL    Bacteriology:  CSF studies:  EEG:  Neuroimaging:  10/12 CT head: stable exam  10/12 CTA:  stable attenuated caliber to distal L cervical ICA with focal area of high-grade stenosis / occlusion involving L cavernous segment secondary to underlying cavernous sinus meningioma, stable thrombosis involving 2/3 of SSS, small defect distal R transverse sinus - additional thrombus vs arachnoid granulation  Other imaging:    MEDICATIONS: ASA 81mg PO daily oxycodone PRN tramadol PRN acetazolamide 250mg PO q12h bisacodyl PRN docusate 100mg PO TID polyethylene glycol 17G daily dexamethasone 1mg IV q6h levothyroxine 200 ug PO daiyl     IV FLUIDS:  DRIPS: heparin gtt.  DIET:  Lines:  Drains:    Wounds:    CODE STATUS:  Full Code                       GOALS OF CARE:  aggressive                      DISPOSITION:  [facility]

## 2019-10-14 NOTE — PROGRESS NOTE ADULT - ASSESSMENT
63y/F with  1.  venous sinus thrombosis involving superior sagittal sinus, possibly R transverse sinus  2.  cavernous sinus meningioma, brain compression  3.  high grade stenosis / occlusion distal L cervical ICA  4.  h/o thyroid carcinoma, hypothyroidism  5.  asthma, not in exacerbation      PLAN:   - continue heparin gtt, coumadin to start tonight, goal INR 2-3, overlap over 2-3 days of therapeutic INR  - discussed with hematology - continue ASA until results of JAK2 available

## 2019-10-14 NOTE — CONSULT NOTE ADULT - PROBLEM SELECTOR RECOMMENDATION 2
Reactive? Primary? will order Talat 2 mutation analysis for am.
2/2 to venous sinus thrombosis  -mgmt as above.

## 2019-10-14 NOTE — PROGRESS NOTE ADULT - PROBLEM SELECTOR PLAN 1
Patient with progressively worsening headache x3 months with recent diagnosis as outpatient of sinus cavernous thrombosis, started on lovenox as outpatient with imaging as above showing progression of venous sinus thrombosis now on hep gtt.  -c/w hep gtt 9.5cc/hr with goal for PTT 60-80  -currently therapeutic on current regimen  -c/w asa 81  -on decadron taper to finish 10/15  -Diamox 250mg BID for potential benefit of decreased CSF production  -transition to coumadin tonight 10mg  -on 150cc/hr for pressure support, will monitor lung checks, patient currently with good urine output.

## 2019-10-14 NOTE — CONSULT NOTE ADULT - PROBLEM SELECTOR RECOMMENDATION 4
Patient with persistent thrombocytosis seen on labs with PLT > 500. Possibly related to etiology of thrombosis  -Dr. pradhan from heme following  -f/u JAK2  -continue IVF at 150cc/hr to prevent clotting 2/2 to thrombocytosis LR  -trend CBC for plt

## 2019-10-14 NOTE — PROGRESS NOTE ADULT - SUBJECTIVE AND OBJECTIVE BOX
OVERNIGHT EVENTS: No acute events overnight    SUBJECTIVE / INTERVAL HPI: Patient seen and examined at bedside. This morning endorsis bilateral bandlike headache, not similar to headache that she gets from the venous sinus thrombosis. Otherwise patient denies vision changes, nausea, vomiting, cough, cp, sob.      Review of systems negative except as noted above.     VITAL SIGNS:  Vital Signs Last 24 Hrs  T(C): 37.2 (14 Oct 2019 13:47), Max: 37.2 (13 Oct 2019 18:00)  T(F): 99 (14 Oct 2019 13:47), Max: 99 (13 Oct 2019 18:00)  HR: 62 (14 Oct 2019 12:12) (42 - 78)  BP: 132/60 (14 Oct 2019 12:12) (113/66 - 138/63)  BP(mean): 87 (14 Oct 2019 12:12) (81 - 95)  RR: 95 (14 Oct 2019 12:12) (18 - 95)  SpO2: 98% (14 Oct 2019 08:24) (93% - 98%)      10-13-19 @ 07:01  -  10-14-19 @ 07:00  --------------------------------------------------------  IN: 3763.5 mL / OUT: 5400 mL / NET: -1636.5 mL    10-14-19 @ 07:01  -  10-14-19 @ 13:54  --------------------------------------------------------  IN: 1116.5 mL / OUT: 1700 mL / NET: -583.5 mL      PHYSICAL EXAM:  General: WDWN, resting comfortably on bed at time of exam in NAD  HEENT: NC/AT, anicteric sclera  Neck: -JVD  Cardiovascular: +S1/S2; RRR  Respiratory: CTA B/L; no W/R/R  Gastrointestinal: soft, NT/ND; +BS  Extremities: WWP; no edema,   Vascular: 2+ radial, DP pulses B/L  - Mental Status:  AAOx3;   - Cranial Nerves II-XII:    II:  PERRLA; visual fields are full to confrontation  III, IV, VI:  EOMI, no nystagmus  V:  facial sensation is intact in the V1-V3 distribution bilaterally.  VII:  face is symmetric with normal eye closure and smile  VIII:  hearing is intact to finger rub  IX, X:  uvula is midline and soft palate rises symmetrically  XI:  head turning and shoulder shrug are intact bilaterally  XII:  tongue protrudes in the midline  - Motor:  strength is 5/5 throughout; normal muscle bulk and tone throughout; no pronator drift  - Sensory:  intact to light touch,   - Coordination:  finger-nose-finger intact, no pronator drift        MEDICATIONS:  MEDICATIONS  (STANDING):  acetazolamide    Tablet 250 milliGRAM(s) Oral every 12 hours  aspirin enteric coated 81 milliGRAM(s) Oral daily  dexamethasone  Injectable   IV Push   dexamethasone  Injectable 1 milliGRAM(s) IV Push every 6 hours  docusate sodium 100 milliGRAM(s) Oral three times a day  heparin  Infusion 950 Unit(s)/Hr (9.5 mL/Hr) IV Continuous <Continuous>  influenza   Vaccine 0.5 milliLiter(s) IntraMuscular once  lactated ringers. 1000 milliLiter(s) (150 mL/Hr) IV Continuous <Continuous>  levothyroxine 200 MICROGram(s) Oral daily  polyethylene glycol 3350 17 Gram(s) Oral daily    MEDICATIONS  (PRN):  acetaminophen   Tablet .. 325 milliGRAM(s) Oral every 4 hours PRN Moderate Pain (4 - 6)  acetaminophen 300 mG/butalbital 50 mG/ caffeine 40 mG 1 Capsule(s) Oral every 4 hours PRN headache  bisacodyl Suppository 10 milliGRAM(s) Rectal daily PRN Constipation  ondansetron Injectable 4 milliGRAM(s) IV Push every 6 hours PRN Nausea and/or Vomiting  oxyCODONE    IR 5 milliGRAM(s) Oral every 4 hours PRN Moderate Pain (4 - 6)  traMADol 25 milliGRAM(s) Oral every 4 hours PRN Mild Pain (1 - 3)      ALLERGIES:  Allergies    penicillin (Hives)    Intolerances        LABS:                        11.9   17.79 )-----------( 562      ( 14 Oct 2019 07:17 )             38.9     10-14    141  |  112<H>  |  13  ----------------------------<  88  3.7   |  19<L>  |  0.73    Ca    8.7      14 Oct 2019 07:17  Phos  2.6     10-14  Mg     1.9     10-14      PT/INR - ( 12 Oct 2019 21:50 )   PT: 11.3 sec;   INR: 1.00          PTT - ( 14 Oct 2019 07:17 )  PTT:80.2 sec    CAPILLARY BLOOD GLUCOSE              RADIOLOGY & ADDITIONAL TESTS: Reviewed.

## 2019-10-14 NOTE — PROGRESS NOTE ADULT - PROBLEM SELECTOR PLAN 9
DVT ppx: on hep gtt  F: LR 150cc/hr  E: Replete lytes to K>4 Mg >2  N: Patient quit smoking on admission.   -counseled on smoking cessation

## 2019-10-15 DIAGNOSIS — D47.3 ESSENTIAL (HEMORRHAGIC) THROMBOCYTHEMIA: ICD-10-CM

## 2019-10-15 LAB
ANION GAP SERPL CALC-SCNC: 10 MMOL/L — SIGNIFICANT CHANGE UP (ref 5–17)
APTT BLD: 81.5 SEC — HIGH (ref 27.5–36.3)
BUN SERPL-MCNC: 11 MG/DL — SIGNIFICANT CHANGE UP (ref 7–23)
CALCIUM SERPL-MCNC: 8.5 MG/DL — SIGNIFICANT CHANGE UP (ref 8.4–10.5)
CHLORIDE SERPL-SCNC: 113 MMOL/L — HIGH (ref 96–108)
CO2 SERPL-SCNC: 19 MMOL/L — LOW (ref 22–31)
CREAT SERPL-MCNC: 0.76 MG/DL — SIGNIFICANT CHANGE UP (ref 0.5–1.3)
GLUCOSE SERPL-MCNC: 91 MG/DL — SIGNIFICANT CHANGE UP (ref 70–99)
HCT VFR BLD CALC: 42.5 % — SIGNIFICANT CHANGE UP (ref 34.5–45)
HGB BLD-MCNC: 13.2 G/DL — SIGNIFICANT CHANGE UP (ref 11.5–15.5)
INR BLD: 1.1 — SIGNIFICANT CHANGE UP (ref 0.88–1.16)
JAK2 P.V617F BLD/T QL: POSITIVE
MCHC RBC-ENTMCNC: 29.1 PG — SIGNIFICANT CHANGE UP (ref 27–34)
MCHC RBC-ENTMCNC: 31.1 GM/DL — LOW (ref 32–36)
MCV RBC AUTO: 93.6 FL — SIGNIFICANT CHANGE UP (ref 80–100)
NRBC # BLD: 0 /100 WBCS — SIGNIFICANT CHANGE UP (ref 0–0)
PLATELET # BLD AUTO: 577 K/UL — HIGH (ref 150–400)
POTASSIUM SERPL-MCNC: 4.3 MMOL/L — SIGNIFICANT CHANGE UP (ref 3.5–5.3)
POTASSIUM SERPL-SCNC: 4.3 MMOL/L — SIGNIFICANT CHANGE UP (ref 3.5–5.3)
PROTHROM AB SERPL-ACNC: 12.5 SEC — SIGNIFICANT CHANGE UP (ref 10–12.9)
RBC # BLD: 4.54 M/UL — SIGNIFICANT CHANGE UP (ref 3.8–5.2)
RBC # FLD: 14.1 % — SIGNIFICANT CHANGE UP (ref 10.3–14.5)
SODIUM SERPL-SCNC: 142 MMOL/L — SIGNIFICANT CHANGE UP (ref 135–145)
WBC # BLD: 14.23 K/UL — HIGH (ref 3.8–10.5)
WBC # FLD AUTO: 14.23 K/UL — HIGH (ref 3.8–10.5)

## 2019-10-15 PROCEDURE — 99232 SBSQ HOSP IP/OBS MODERATE 35: CPT

## 2019-10-15 PROCEDURE — 99233 SBSQ HOSP IP/OBS HIGH 50: CPT

## 2019-10-15 PROCEDURE — 99233 SBSQ HOSP IP/OBS HIGH 50: CPT | Mod: GC

## 2019-10-15 RX ORDER — WARFARIN SODIUM 2.5 MG/1
10 TABLET ORAL ONCE
Refills: 0 | Status: COMPLETED | OUTPATIENT
Start: 2019-10-15 | End: 2019-10-15

## 2019-10-15 RX ORDER — ACETAMINOPHEN 500 MG
325 TABLET ORAL EVERY 4 HOURS
Refills: 0 | Status: DISCONTINUED | OUTPATIENT
Start: 2019-10-15 | End: 2019-10-20

## 2019-10-15 RX ORDER — WARFARIN SODIUM 2.5 MG/1
5 TABLET ORAL ONCE
Refills: 0 | Status: DISCONTINUED | OUTPATIENT
Start: 2019-10-15 | End: 2019-10-15

## 2019-10-15 RX ADMIN — WARFARIN SODIUM 10 MILLIGRAM(S): 2.5 TABLET ORAL at 22:04

## 2019-10-15 RX ADMIN — ACETAZOLAMIDE 250 MILLIGRAM(S): 250 TABLET ORAL at 06:08

## 2019-10-15 RX ADMIN — Medication 200 MICROGRAM(S): at 06:09

## 2019-10-15 RX ADMIN — Medication 1 MILLIGRAM(S): at 11:36

## 2019-10-15 RX ADMIN — SODIUM CHLORIDE 150 MILLILITER(S): 9 INJECTION, SOLUTION INTRAVENOUS at 06:14

## 2019-10-15 RX ADMIN — Medication 100 MILLIGRAM(S): at 15:17

## 2019-10-15 RX ADMIN — ACETAZOLAMIDE 250 MILLIGRAM(S): 250 TABLET ORAL at 17:48

## 2019-10-15 RX ADMIN — Medication 1 MILLIGRAM(S): at 06:08

## 2019-10-15 RX ADMIN — Medication 81 MILLIGRAM(S): at 11:36

## 2019-10-15 RX ADMIN — Medication 100 MILLIGRAM(S): at 22:04

## 2019-10-15 RX ADMIN — POLYETHYLENE GLYCOL 3350 17 GRAM(S): 17 POWDER, FOR SOLUTION ORAL at 11:37

## 2019-10-15 RX ADMIN — SODIUM CHLORIDE 150 MILLILITER(S): 9 INJECTION, SOLUTION INTRAVENOUS at 22:03

## 2019-10-15 RX ADMIN — Medication 100 MILLIGRAM(S): at 06:08

## 2019-10-15 NOTE — PROGRESS NOTE ADULT - PROBLEM SELECTOR PLAN 1
Patient with progressively worsening headache x3 months with recent diagnosis as outpatient of sinus cavernous thrombosis, started on lovenox as outpatient with imaging as above showing progression of venous sinus thrombosis now on hep gtt.  -c/w hep gtt   -c/w asa 81  -on decadron taper to finish 10/15  -Diamox 250mg BID for potential benefit of decreased CSF production.

## 2019-10-15 NOTE — PROGRESS NOTE ADULT - PROBLEM SELECTOR PLAN 1
Patient with progressively worsening headache x3 months with recent diagnosis as outpatient of sinus cavernous thrombosis, started on lovenox as outpatient with imaging showing progression of venous sinus thrombosis now on hep gtt.  -hep gtt with full anti-coagulation goal ptt 58-99  -asa 81 for now, will d/c pending JAK2 results  -on decadron taper to finish 10/15  -Diamox 250mg BID for potential benefit of decreased CSF production. Patient with progressively worsening headache x3 months with recent diagnosis as outpatient of sinus cavernous thrombosis, started on lovenox as outpatient with imaging showing progression of venous sinus thrombosis now on hep gtt.  -hep gtt with full anti-coagulation goal ptt 58-99, being transitioned to coumadin.   -INR at 1.10 this am, will dose coumadin again tonight.   -asa 81 for now, will d/c pending JAK2 results  -on decadron taper to finish 10/15  -Diamox 250mg BID for potential benefit of decreased CSF production.

## 2019-10-15 NOTE — PROGRESS NOTE ADULT - PROBLEM SELECTOR PLAN 9
Patient afebrile throughout hospital course with persistent white count, no S&S of infectious etiology, no cough, ho hematuria, no rashes. likely 2/2 to steroids  -trend CBC.  -Heme also following.

## 2019-10-15 NOTE — PROGRESS NOTE ADULT - SUBJECTIVE AND OBJECTIVE BOX
HEALTH ISSUES - PROBLEM Dx:  Smoking: Smoking  Other elevated white blood cell (WBC) count: Other elevated white blood cell (WBC) count  Overweight (BMI 25.0-29.9): Overweight (BMI 25.0-29.9)  Mild intermittent asthma without complication: Mild intermittent asthma without complication  Other headache syndrome: Other headache syndrome  Hypothyroid: Hypothyroid  Prophylactic measure: Prophylactic measure  Asthma: Asthma  Hyperchloremic metabolic acidosis: Hyperchloremic metabolic acidosis  Bradycardia: Bradycardia  Leukocytosis: Leukocytosis  Headache: Headache  Thrombocytosis: Thrombocytosis  Thyroid carcinoma: Thyroid carcinoma  Brain tumor: Brain tumor  Thrombosis, superior sagittal sinus: Thrombosis, superior sagittal sinus          CHEMOTHERAPY REGIMEN:        Day:                          Diet:  Protocol:                                    IVF:      MEDICATIONS  (STANDING):  acetazolamide    Tablet 250 milliGRAM(s) Oral every 12 hours  aspirin enteric coated 81 milliGRAM(s) Oral daily  docusate sodium 100 milliGRAM(s) Oral three times a day  heparin  Infusion 950 Unit(s)/Hr (9.5 mL/Hr) IV Continuous <Continuous>  influenza   Vaccine 0.5 milliLiter(s) IntraMuscular once  lactated ringers. 1000 milliLiter(s) (150 mL/Hr) IV Continuous <Continuous>  levothyroxine 200 MICROGram(s) Oral daily  polyethylene glycol 3350 17 Gram(s) Oral daily  warfarin 10 milliGRAM(s) Oral once    MEDICATIONS  (PRN):  acetaminophen   Tablet .. 325 milliGRAM(s) Oral every 4 hours PRN Mild Pain (1-3)  acetaminophen 300 mG/butalbital 50 mG/ caffeine 40 mG 1 Capsule(s) Oral every 4 hours PRN headache  bisacodyl Suppository 10 milliGRAM(s) Rectal daily PRN Constipation  ondansetron Injectable 4 milliGRAM(s) IV Push every 6 hours PRN Nausea and/or Vomiting      Allergies    penicillin (Hives)    Intolerances        DVT Prophylaxis: [ ] YES [ ] NO      Antibiotics: [ ] YES [ ] NO    Pain Scale (1-10):       Location:    Vital Signs Last 24 Hrs  T(C): 36.7 (15 Oct 2019 17:20), Max: 36.9 (14 Oct 2019 22:00)  T(F): 98.1 (15 Oct 2019 17:20), Max: 98.5 (14 Oct 2019 22:00)  HR: 46 (15 Oct 2019 16:30) (38 - 54)  BP: 155/83 (15 Oct 2019 16:30) (106/52 - 155/83)  BP(mean): 105 (15 Oct 2019 16:30) (75 - 105)  RR: 24 (15 Oct 2019 16:30) (18 - 26)  SpO2: 95% (15 Oct 2019 16:30) (92% - 97%)    Drug Dosing Weight  Height (cm): 160.02 (08 Oct 2019 22:41)  Weight (kg): 64.9 (08 Oct 2019 22:41)  BMI (kg/m2): 25.3 (08 Oct 2019 22:41)  BSA (m2): 1.68 (08 Oct 2019 22:41)     Physical Exam:  · Constitutional	detailed exam  · Constitutional Details	well-developed; well-groomed  · Eyes	EOMI; PERRL; no drainage or redness  · ENMT Comments	dry mucous membranes  · Respiratory	detailed exam  · Respiratory Comments	normal breath sounds at the lung bases bilaterally  · Cardiovascular	Regular rate & rhythm, normal S1, S2; no murmurs, gallops or rubs; no S3, S4  · Abd-Soft non tender  ·Ext-no edema, clubbing or cyanosis    URINARY CATHETER: [ ] YES [ ] NO     LABS:  CBC Full  -  ( 15 Oct 2019 06:52 )  WBC Count : 14.23 K/uL  RBC Count : 4.54 M/uL  Hemoglobin : 13.2 g/dL  Hematocrit : 42.5 %  Platelet Count - Automated : 577 K/uL  Mean Cell Volume : 93.6 fl  Mean Cell Hemoglobin : 29.1 pg  Mean Cell Hemoglobin Concentration : 31.1 gm/dL  Auto Neutrophil # : x  Auto Lymphocyte # : x  Auto Monocyte # : x  Auto Eosinophil # : x  Auto Basophil # : x  Auto Neutrophil % : x  Auto Lymphocyte % : x  Auto Monocyte % : x  Auto Eosinophil % : x  Auto Basophil % : x    10-15    142  |  113<H>  |  11  ----------------------------<  91  4.3   |  19<L>  |  0.76    Ca    8.5      15 Oct 2019 06:52  Phos  2.6     10-14  Mg     1.9     10-14      PT/INR - ( 15 Oct 2019 06:52 )   PT: 12.5 sec;   INR: 1.10          PTT - ( 15 Oct 2019 06:52 )  PTT:81.5 sec      CULTURES:    RADIOLOGY & ADDITIONAL STUDIES:

## 2019-10-15 NOTE — CHART NOTE - NSCHARTNOTEFT_GEN_A_CORE
Admitting Diagnosis:   Patient is a 63y old  Female who presents with a chief complaint of headache (15 Oct 2019 09:29)      PAST MEDICAL & SURGICAL HISTORY:  Thyroid carcinoma  Asthma  Thrombosis, superior sagittal sinus  Brain tumor: meningioma  H/O thyroidectomy      Current Nutrition Order:  Regular     PO Intake: Good (%) [  X ]  Fair (50-75%) [   ] Poor (<25%) [   ]    GI Issues: No complaints of N/V; +Constipation (on bowel regimen)    Pain: She denied pain at time of visit; headache resolved this AM    Skin Integrity: Kenny 20, intact pressure-wise     Labs:   10-15    142  |  113<H>  |  11  ----------------------------<  91  4.3   |  19<L>  |  0.76    Ca    8.5      15 Oct 2019 06:52  Phos  2.6     10-14  Mg     1.9     10-14      CAPILLARY BLOOD GLUCOSE          Medications:  MEDICATIONS  (STANDING):  acetazolamide    Tablet 250 milliGRAM(s) Oral every 12 hours  aspirin enteric coated 81 milliGRAM(s) Oral daily  docusate sodium 100 milliGRAM(s) Oral three times a day  heparin  Infusion 950 Unit(s)/Hr (9.5 mL/Hr) IV Continuous <Continuous>  influenza   Vaccine 0.5 milliLiter(s) IntraMuscular once  lactated ringers. 1000 milliLiter(s) (150 mL/Hr) IV Continuous <Continuous>  levothyroxine 200 MICROGram(s) Oral daily  polyethylene glycol 3350 17 Gram(s) Oral daily    MEDICATIONS  (PRN):  acetaminophen   Tablet .. 325 milliGRAM(s) Oral every 4 hours PRN Moderate Pain (4 - 6)  acetaminophen 300 mG/butalbital 50 mG/ caffeine 40 mG 1 Capsule(s) Oral every 4 hours PRN headache  bisacodyl Suppository 10 milliGRAM(s) Rectal daily PRN Constipation  ondansetron Injectable 4 milliGRAM(s) IV Push every 6 hours PRN Nausea and/or Vomiting  oxyCODONE    IR 5 milliGRAM(s) Oral every 4 hours PRN Moderate Pain (4 - 6)  traMADol 25 milliGRAM(s) Oral every 4 hours PRN Mild Pain (1 - 3)      Weight: 64.9kg   Daily     Daily     Weight Change: No new weights recorded since admit     Nutrition Focused Physical Exam: Completed [   ]  Not Pertinent [  X ]    Estimated energy needs: Ideal body weight (52.1kg) used for calculations as pt >120% of IBW. Needs estimated for maintenance in adults  Calories: 25-30 kcal/kg = 3021-6844 kcal/day  Protein: 0.8-1.0 g/kg = 40-52g protein/day  Fluids: 30-35 mL/kg = 5070-7406 mL/day    Subjective: 64 yo/female with PMHx asthma, thyroid cancer, intracavernous tumor/meningioma, w/recent diagnosis of large acute sinus sagittal thrombosis, presented with worsening headache. CTA showing high grade stenosis /occlusion distal L. cervical ICA. Started on heparin gtt and being transitioned to coumadin. Pt seen in room, awake, alert, very pleasant. She reports much improved appetite with 100% intake per meal. No complaints of N/V but endorses constipation, likely 2/2 pain medications (ordered for Dulcolax, miralax, and colace). WBC trending down. Education provided, will continue to follow per RD protocol.     Previous Nutrition Diagnosis:  Inadequate Energy Intake RT current NPO status AEB 0% of EER being met at this time.     Active [   ]  Resolved [  X ]    If resolved, new PES: Increased nutrient needs RT increased demand for fiber and fluid intake AEB constipation    Goal: Pt will have regular, daily BMs    Recommendations:  1. Encourage fiber/fluid intake   2. Bowel and pain regimens per team     Education: Vitamin K/Coumadin interaction, high fiber foods     Risk Level: High [   ] Moderate [   ] Low [ X  ]

## 2019-10-15 NOTE — PROGRESS NOTE ADULT - SUBJECTIVE AND OBJECTIVE BOX
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 Tejeda) who p/w worsening headache on 10/9. Pt was started on heparin gtt given possible enlargement of thrombosis on Lovenox.  Now being bridged to warfarin    Subjective:  Pt has no acute complaints. Reports that her headache has significantly improved. Denies chest pain. 12 pt ROS is otherwise negative.    Allergies    penicillin (Hives)    Intolerances    MEDICATIONS  (STANDING):  acetazolamide    Tablet 250 milliGRAM(s) Oral every 12 hours  aspirin enteric coated 81 milliGRAM(s) Oral daily  dexamethasone  Injectable   IV Push   dexamethasone  Injectable 1 milliGRAM(s) IV Push every 6 hours  docusate sodium 100 milliGRAM(s) Oral three times a day  heparin  Infusion 950 Unit(s)/Hr (9.5 mL/Hr) IV Continuous <Continuous>  influenza   Vaccine 0.5 milliLiter(s) IntraMuscular once  lactated ringers. 1000 milliLiter(s) (150 mL/Hr) IV Continuous <Continuous>  levothyroxine 200 MICROGram(s) Oral daily  polyethylene glycol 3350 17 Gram(s) Oral daily    MEDICATIONS  (PRN):  acetaminophen   Tablet .. 325 milliGRAM(s) Oral every 4 hours PRN Moderate Pain (4 - 6)  acetaminophen 300 mG/butalbital 50 mG/ caffeine 40 mG 1 Capsule(s) Oral every 4 hours PRN headache  bisacodyl Suppository 10 milliGRAM(s) Rectal daily PRN Constipation  ondansetron Injectable 4 milliGRAM(s) IV Push every 6 hours PRN Nausea and/or Vomiting  oxyCODONE    IR 5 milliGRAM(s) Oral every 4 hours PRN Moderate Pain (4 - 6)  traMADol 25 milliGRAM(s) Oral every 4 hours PRN Mild Pain (1 - 3)      Drug Dosing Weight  Height (cm): 160.02 (08 Oct 2019 22:41)  Weight (kg): 64.9 (08 Oct 2019 22:41)  BMI (kg/m2): 25.3 (08 Oct 2019 22:41)  BSA (m2): 1.68 (08 Oct 2019 22:41)    PAST MEDICAL & SURGICAL HISTORY:  Thyroid carcinoma  Asthma  Thrombosis, superior sagittal sinus  Brain tumor: meningioma  H/O thyroidectomy    FAMILY HISTORY: no cardiac disease    SOCIAL HISTORY: quit smoking 1 week ago    Vital Signs Last 24 Hrs  T(C): 36.8 (15 Oct 2019 09:17), Max: 37.2 (14 Oct 2019 13:47)  T(F): 98.3 (15 Oct 2019 09:17), Max: 99 (14 Oct 2019 13:47)  HR: 40 (15 Oct 2019 04:59) (38 - 70)  BP: 122/61 (15 Oct 2019 04:59) (106/79 - 135/63)  BP(mean): 88 (15 Oct 2019 04:59) (83 - 90)  RR: 18 (15 Oct 2019 04:59) (17 - 95)  SpO2: 94% (15 Oct 2019 04:59) (92% - 96%)  PHYSICAL EXAM:    Constitutional: NAD  Eyes: PERRLA  ENMT: MMM  Neck: supple  Back: midline  Respiratory: CTA b/l  Cardiovascular: rrr, s1s2, no m/r/g  Gastrointestinal: soft, NTND, + BS  Extremities: warm  Vascular: + 2 pulses radial, no edema  Neurological: AAO x 4, 5/5 strength in all extremities  Skin: no rash  Lymph Nodes: no LAD  Musculoskeletal: no joint swelling  Psychiatric: normal affect    LABS:                        13.2   14.23 )-----------( 577      ( 15 Oct 2019 06:52 )             42.5   10-15    142  |  113<H>  |  11  ----------------------------<  91  4.3   |  19<L>  |  0.76    Ca    8.5      15 Oct 2019 06:52  Phos  2.6     10-14  Mg     1.9     10-14    PT/INR - ( 15 Oct 2019 06:52 )   PT: 12.5 sec;   INR: 1.10          PTT - ( 15 Oct 2019 06:52 )  PTT:81.5 sec    EKG:  Sinus bradycardia    ECHO, US:  US duplex:  No vein thrombosis seen.    RADIOLOGY:  CT head w/o contrast:  No intracranial hemorrhage. Stable exam.    MRI brain:  The MRI examination of the brain demonstrates the ventricles, cisternal spaces, and cortical sulci to be appropriate for the patient's stated age. There is no midline shift or extra-axial collection. There remains a few punctate foci of increased signal within the periventricular and subcortical white matter which is nonspecific and may represent the sequela of small vessel ischemic disease in this patient. The diffusion-weighted images demonstrate no acute ischemia. The GRE images demonstrate no hemorrhagic products. There is hyperintense FLAIR   signal within the superior sagittal sinus which correlates with patient's known sinus thrombosis. There is a isointense soft tissue mass involving the left cavernous sinuses with extension along the left middle cranial fossa with narrowing of the left internal carotid artery flow void. The left carotid flow void is attenuated with hyperintense signal with the petrous and cavernous   segments. These findings are compatible with the patient's underlying cavernous sinuses meningioma.    CTA head/neck:  Stable attenuated caliber to the distal left cervical internal carotid artery with focal area of high-grade stenosis/occlusion involving the left cavernous segment secondary to underlying cavernous sinus meningioma. Relatively stable thrombosis involving nearly the 2/3 of the superior sagittal sinus. Small filling defect within the underdeveloped distal right transverse sinus which may represent additional thrombus versus arachnoid granulation.

## 2019-10-15 NOTE — PROGRESS NOTE ADULT - SUBJECTIVE AND OBJECTIVE BOX
OVERNIGHT EVENTS: patient with bradycardia down to 30s overnight while sleeping.    SUBJECTIVE / INTERVAL HPI: Patient seen and examined at bedside. Endorses posterior occipital head pressure but no headache, vision    Review of systems negative except as noted above.     VITAL SIGNS:  Vital Signs Last 24 Hrs  T(C): 36.8 (15 Oct 2019 09:17), Max: 37.2 (14 Oct 2019 13:47)  T(F): 98.3 (15 Oct 2019 09:17), Max: 99 (14 Oct 2019 13:47)  HR: 40 (15 Oct 2019 04:59) (38 - 70)  BP: 122/61 (15 Oct 2019 04:59) (106/79 - 135/63)  BP(mean): 88 (15 Oct 2019 04:59) (83 - 90)  RR: 18 (15 Oct 2019 04:59) (17 - 95)  SpO2: 94% (15 Oct 2019 04:59) (92% - 96%)      10-14-19 @ 07:01  -  10-15-19 @ 07:00  --------------------------------------------------------  IN: 3837.5 mL / OUT: 3450 mL / NET: 387.5 mL    10-15-19 @ 07:01  -  10-15-19 @ 09:29  --------------------------------------------------------  IN: 219 mL / OUT: 0 mL / NET: 219 mL        PHYSICAL EXAM:    General: WDWN, resting comfortably on bed at time of exam in NAD  HEENT: NC/AT; anicteric sclera  Neck: supple  Cardiovascular: +S1/S2; RRR  Respiratory: CTA B/L; no W/R/R  Gastrointestinal: soft, NT/ND; +BS  Extremities: WWP; no edema, clubbing or cyanosis  Vascular: 2+ radial, DP pulses B/L  Neurological: AAOx3; no focal deficits    MEDICATIONS:  MEDICATIONS  (STANDING):  acetazolamide    Tablet 250 milliGRAM(s) Oral every 12 hours  aspirin enteric coated 81 milliGRAM(s) Oral daily  dexamethasone  Injectable   IV Push   dexamethasone  Injectable 1 milliGRAM(s) IV Push every 6 hours  docusate sodium 100 milliGRAM(s) Oral three times a day  heparin  Infusion 950 Unit(s)/Hr (9.5 mL/Hr) IV Continuous <Continuous>  influenza   Vaccine 0.5 milliLiter(s) IntraMuscular once  lactated ringers. 1000 milliLiter(s) (150 mL/Hr) IV Continuous <Continuous>  levothyroxine 200 MICROGram(s) Oral daily  polyethylene glycol 3350 17 Gram(s) Oral daily    MEDICATIONS  (PRN):  acetaminophen   Tablet .. 325 milliGRAM(s) Oral every 4 hours PRN Moderate Pain (4 - 6)  acetaminophen 300 mG/butalbital 50 mG/ caffeine 40 mG 1 Capsule(s) Oral every 4 hours PRN headache  bisacodyl Suppository 10 milliGRAM(s) Rectal daily PRN Constipation  ondansetron Injectable 4 milliGRAM(s) IV Push every 6 hours PRN Nausea and/or Vomiting  oxyCODONE    IR 5 milliGRAM(s) Oral every 4 hours PRN Moderate Pain (4 - 6)  traMADol 25 milliGRAM(s) Oral every 4 hours PRN Mild Pain (1 - 3)      ALLERGIES:  Allergies    penicillin (Hives)    Intolerances        LABS:                        13.2   14.23 )-----------( 577      ( 15 Oct 2019 06:52 )             42.5     10-15    142  |  113<H>  |  11  ----------------------------<  91  4.3   |  19<L>  |  0.76    Ca    8.5      15 Oct 2019 06:52  Phos  2.6     10-14  Mg     1.9     10-14      PT/INR - ( 15 Oct 2019 06:52 )   PT: 12.5 sec;   INR: 1.10          PTT - ( 15 Oct 2019 06:52 )  PTT:81.5 sec    CAPILLARY BLOOD GLUCOSE              RADIOLOGY & ADDITIONAL TESTS: Reviewed. OVERNIGHT EVENTS: patient with bradycardia down to 30s overnight while sleeping.    SUBJECTIVE / INTERVAL HPI: Patient seen and examined at bedside. Endorses posterior occipital head pressure but no headache, vision changes, dizziness, palpitations, SOB, cp. f/c/n/v.    Review of systems negative except as noted above.     VITAL SIGNS:  Vital Signs Last 24 Hrs  T(C): 36.8 (15 Oct 2019 09:17), Max: 37.2 (14 Oct 2019 13:47)  T(F): 98.3 (15 Oct 2019 09:17), Max: 99 (14 Oct 2019 13:47)  HR: 40 (15 Oct 2019 04:59) (38 - 70)  BP: 122/61 (15 Oct 2019 04:59) (106/79 - 135/63)  BP(mean): 88 (15 Oct 2019 04:59) (83 - 90)  RR: 18 (15 Oct 2019 04:59) (17 - 95)  SpO2: 94% (15 Oct 2019 04:59) (92% - 96%)      10-14-19 @ 07:01  -  10-15-19 @ 07:00  --------------------------------------------------------  IN: 3837.5 mL / OUT: 3450 mL / NET: 387.5 mL    10-15-19 @ 07:01  -  10-15-19 @ 09:29  --------------------------------------------------------  IN: 219 mL / OUT: 0 mL / NET: 219 mL    PHYSICAL EXAM:  General: WDWN, resting comfortably on bed at time of exam in NAD  HEENT: NC/AT, anicteric sclera  Neck: -JVD  Cardiovascular: +S1/S2; RRR  Respiratory: CTA B/L; no W/R/R  Gastrointestinal: soft, NT/ND; +BS  Extremities: WWP; no edema,   Vascular: 2+ radial, DP pulses B/L  - Mental Status:  AAOx3;   - Cranial Nerves II-XII:    II:  PERRLA; visual fields are full to confrontation  III, IV, VI:  EOMI, no nystagmus  V:  facial sensation is intact in the V1-V3 distribution bilaterally.  VII:  face is symmetric with normal eye closure and smile  VIII:  hearing is intact to finger rub  IX, X:  uvula is midline and soft palate rises symmetrically  XI:  head turning and shoulder shrug are intact bilaterally  XII:  tongue protrudes in the midline  - Motor:  strength is 5/5 throughout; normal muscle bulk and tone throughout; no pronator drift  - Sensory:  intact to light touch,   - Coordination:  finger-nose-finger intact, no pronator drift      MEDICATIONS:  MEDICATIONS  (STANDING):  acetazolamide    Tablet 250 milliGRAM(s) Oral every 12 hours  aspirin enteric coated 81 milliGRAM(s) Oral daily  dexamethasone  Injectable   IV Push   dexamethasone  Injectable 1 milliGRAM(s) IV Push every 6 hours  docusate sodium 100 milliGRAM(s) Oral three times a day  heparin  Infusion 950 Unit(s)/Hr (9.5 mL/Hr) IV Continuous <Continuous>  influenza   Vaccine 0.5 milliLiter(s) IntraMuscular once  lactated ringers. 1000 milliLiter(s) (150 mL/Hr) IV Continuous <Continuous>  levothyroxine 200 MICROGram(s) Oral daily  polyethylene glycol 3350 17 Gram(s) Oral daily    MEDICATIONS  (PRN):  acetaminophen   Tablet .. 325 milliGRAM(s) Oral every 4 hours PRN Moderate Pain (4 - 6)  acetaminophen 300 mG/butalbital 50 mG/ caffeine 40 mG 1 Capsule(s) Oral every 4 hours PRN headache  bisacodyl Suppository 10 milliGRAM(s) Rectal daily PRN Constipation  ondansetron Injectable 4 milliGRAM(s) IV Push every 6 hours PRN Nausea and/or Vomiting  oxyCODONE    IR 5 milliGRAM(s) Oral every 4 hours PRN Moderate Pain (4 - 6)  traMADol 25 milliGRAM(s) Oral every 4 hours PRN Mild Pain (1 - 3)      ALLERGIES:  Allergies    penicillin (Hives)    Intolerances        LABS:                        13.2   14.23 )-----------( 577      ( 15 Oct 2019 06:52 )             42.5     10-15    142  |  113<H>  |  11  ----------------------------<  91  4.3   |  19<L>  |  0.76    Ca    8.5      15 Oct 2019 06:52  Phos  2.6     10-14  Mg     1.9     10-14      PT/INR - ( 15 Oct 2019 06:52 )   PT: 12.5 sec;   INR: 1.10          PTT - ( 15 Oct 2019 06:52 )  PTT:81.5 sec    CAPILLARY BLOOD GLUCOSE              RADIOLOGY & ADDITIONAL TESTS: Reviewed. 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 Tejeda) who p/w worsening headache on 10/9. Pt was started on heparin gtt given possible enlargement of thrombosis on Lovenox.  Now being bridged to warfarin      OVERNIGHT EVENTS: patient with bradycardia down to 30s overnight while sleeping.    SUBJECTIVE / INTERVAL HPI: Patient seen and examined at bedside. Endorses posterior occipital head pressure but no headache, vision changes, dizziness, palpitations, SOB, cp. f/c/n/v.    Review of systems negative except as noted above.     VITAL SIGNS:  Vital Signs Last 24 Hrs  T(C): 36.8 (15 Oct 2019 09:17), Max: 37.2 (14 Oct 2019 13:47)  T(F): 98.3 (15 Oct 2019 09:17), Max: 99 (14 Oct 2019 13:47)  HR: 40 (15 Oct 2019 04:59) (38 - 70)  BP: 122/61 (15 Oct 2019 04:59) (106/79 - 135/63)  BP(mean): 88 (15 Oct 2019 04:59) (83 - 90)  RR: 18 (15 Oct 2019 04:59) (17 - 95)  SpO2: 94% (15 Oct 2019 04:59) (92% - 96%)      10-14-19 @ 07:01  -  10-15-19 @ 07:00  --------------------------------------------------------  IN: 3837.5 mL / OUT: 3450 mL / NET: 387.5 mL    10-15-19 @ 07:01  -  10-15-19 @ 09:29  --------------------------------------------------------  IN: 219 mL / OUT: 0 mL / NET: 219 mL    PHYSICAL EXAM:  General: WDWN, resting comfortably on bed at time of exam in NAD  HEENT: NC/AT, anicteric sclera  Neck: -JVD  Cardiovascular: +S1/S2; RRR  Respiratory: CTA B/L; no W/R/R  Gastrointestinal: soft, NT/ND; +BS  Extremities: WWP; no edema,   Vascular: 2+ radial, DP pulses B/L  - Mental Status:  AAOx3;   - Cranial Nerves II-XII:    II:  PERRLA; visual fields are full to confrontation  III, IV, VI:  EOMI, no nystagmus  V:  facial sensation is intact in the V1-V3 distribution bilaterally.  VII:  face is symmetric with normal eye closure and smile  VIII:  hearing is intact to finger rub  IX, X:  uvula is midline and soft palate rises symmetrically  XI:  head turning and shoulder shrug are intact bilaterally  XII:  tongue protrudes in the midline  - Motor:  strength is 5/5 throughout; normal muscle bulk and tone throughout; no pronator drift  - Sensory:  intact to light touch,   - Coordination:  finger-nose-finger intact, no pronator drift      MEDICATIONS:  MEDICATIONS  (STANDING):  acetazolamide    Tablet 250 milliGRAM(s) Oral every 12 hours  aspirin enteric coated 81 milliGRAM(s) Oral daily  dexamethasone  Injectable   IV Push   dexamethasone  Injectable 1 milliGRAM(s) IV Push every 6 hours  docusate sodium 100 milliGRAM(s) Oral three times a day  heparin  Infusion 950 Unit(s)/Hr (9.5 mL/Hr) IV Continuous <Continuous>  influenza   Vaccine 0.5 milliLiter(s) IntraMuscular once  lactated ringers. 1000 milliLiter(s) (150 mL/Hr) IV Continuous <Continuous>  levothyroxine 200 MICROGram(s) Oral daily  polyethylene glycol 3350 17 Gram(s) Oral daily    MEDICATIONS  (PRN):  acetaminophen   Tablet .. 325 milliGRAM(s) Oral every 4 hours PRN Moderate Pain (4 - 6)  acetaminophen 300 mG/butalbital 50 mG/ caffeine 40 mG 1 Capsule(s) Oral every 4 hours PRN headache  bisacodyl Suppository 10 milliGRAM(s) Rectal daily PRN Constipation  ondansetron Injectable 4 milliGRAM(s) IV Push every 6 hours PRN Nausea and/or Vomiting  oxyCODONE    IR 5 milliGRAM(s) Oral every 4 hours PRN Moderate Pain (4 - 6)  traMADol 25 milliGRAM(s) Oral every 4 hours PRN Mild Pain (1 - 3)      ALLERGIES:  Allergies    penicillin (Hives)    Intolerances        LABS:                        13.2   14.23 )-----------( 577      ( 15 Oct 2019 06:52 )             42.5     10-15    142  |  113<H>  |  11  ----------------------------<  91  4.3   |  19<L>  |  0.76    Ca    8.5      15 Oct 2019 06:52  Phos  2.6     10-14  Mg     1.9     10-14      PT/INR - ( 15 Oct 2019 06:52 )   PT: 12.5 sec;   INR: 1.10          PTT - ( 15 Oct 2019 06:52 )  PTT:81.5 sec    CAPILLARY BLOOD GLUCOSE              RADIOLOGY & ADDITIONAL TESTS: Reviewed.

## 2019-10-16 DIAGNOSIS — Z15.89 GENETIC SUSCEPTIBILITY TO OTHER DISEASE: ICD-10-CM

## 2019-10-16 LAB
ANION GAP SERPL CALC-SCNC: 13 MMOL/L — SIGNIFICANT CHANGE UP (ref 5–17)
APTT BLD: 57.8 SEC — HIGH (ref 27.5–36.3)
APTT BLD: 73.9 SEC — HIGH (ref 27.5–36.3)
APTT BLD: 88.5 SEC — HIGH (ref 27.5–36.3)
BUN SERPL-MCNC: 16 MG/DL — SIGNIFICANT CHANGE UP (ref 7–23)
CALCIUM SERPL-MCNC: 8.5 MG/DL — SIGNIFICANT CHANGE UP (ref 8.4–10.5)
CHLORIDE SERPL-SCNC: 110 MMOL/L — HIGH (ref 96–108)
CO2 SERPL-SCNC: 18 MMOL/L — LOW (ref 22–31)
CREAT SERPL-MCNC: 0.8 MG/DL — SIGNIFICANT CHANGE UP (ref 0.5–1.3)
GLUCOSE SERPL-MCNC: 96 MG/DL — SIGNIFICANT CHANGE UP (ref 70–99)
HCT VFR BLD CALC: 42.3 % — SIGNIFICANT CHANGE UP (ref 34.5–45)
HGB BLD-MCNC: 13.1 G/DL — SIGNIFICANT CHANGE UP (ref 11.5–15.5)
INR BLD: 1.27 — HIGH (ref 0.88–1.16)
MAGNESIUM SERPL-MCNC: 1.9 MG/DL — SIGNIFICANT CHANGE UP (ref 1.6–2.6)
MCHC RBC-ENTMCNC: 28.6 PG — SIGNIFICANT CHANGE UP (ref 27–34)
MCHC RBC-ENTMCNC: 31 GM/DL — LOW (ref 32–36)
MCV RBC AUTO: 92.4 FL — SIGNIFICANT CHANGE UP (ref 80–100)
NRBC # BLD: 0 /100 WBCS — SIGNIFICANT CHANGE UP (ref 0–0)
PLATELET # BLD AUTO: 658 K/UL — HIGH (ref 150–400)
POTASSIUM SERPL-MCNC: 3.9 MMOL/L — SIGNIFICANT CHANGE UP (ref 3.5–5.3)
POTASSIUM SERPL-SCNC: 3.9 MMOL/L — SIGNIFICANT CHANGE UP (ref 3.5–5.3)
PROTHROM AB SERPL-ACNC: 14.5 SEC — HIGH (ref 10–12.9)
RBC # BLD: 4.58 M/UL — SIGNIFICANT CHANGE UP (ref 3.8–5.2)
RBC # FLD: 13.9 % — SIGNIFICANT CHANGE UP (ref 10.3–14.5)
SODIUM SERPL-SCNC: 141 MMOL/L — SIGNIFICANT CHANGE UP (ref 135–145)
WBC # BLD: 16.47 K/UL — HIGH (ref 3.8–10.5)
WBC # FLD AUTO: 16.47 K/UL — HIGH (ref 3.8–10.5)

## 2019-10-16 PROCEDURE — 99233 SBSQ HOSP IP/OBS HIGH 50: CPT | Mod: GC

## 2019-10-16 PROCEDURE — 99232 SBSQ HOSP IP/OBS MODERATE 35: CPT

## 2019-10-16 PROCEDURE — 99233 SBSQ HOSP IP/OBS HIGH 50: CPT

## 2019-10-16 RX ORDER — WARFARIN SODIUM 2.5 MG/1
5 TABLET ORAL ONCE
Refills: 0 | Status: DISCONTINUED | OUTPATIENT
Start: 2019-10-16 | End: 2019-10-16

## 2019-10-16 RX ORDER — HEPARIN SODIUM 5000 [USP'U]/ML
1000 INJECTION INTRAVENOUS; SUBCUTANEOUS
Qty: 25000 | Refills: 0 | Status: DISCONTINUED | OUTPATIENT
Start: 2019-10-16 | End: 2019-10-17

## 2019-10-16 RX ORDER — WARFARIN SODIUM 2.5 MG/1
7.5 TABLET ORAL ONCE
Refills: 0 | Status: COMPLETED | OUTPATIENT
Start: 2019-10-16 | End: 2019-10-16

## 2019-10-16 RX ADMIN — ACETAZOLAMIDE 250 MILLIGRAM(S): 250 TABLET ORAL at 06:37

## 2019-10-16 RX ADMIN — Medication 100 MILLIGRAM(S): at 06:38

## 2019-10-16 RX ADMIN — SODIUM CHLORIDE 150 MILLILITER(S): 9 INJECTION, SOLUTION INTRAVENOUS at 04:38

## 2019-10-16 RX ADMIN — Medication 100 MILLIGRAM(S): at 21:30

## 2019-10-16 RX ADMIN — Medication 200 MICROGRAM(S): at 06:38

## 2019-10-16 RX ADMIN — ACETAZOLAMIDE 250 MILLIGRAM(S): 250 TABLET ORAL at 17:18

## 2019-10-16 RX ADMIN — HEPARIN SODIUM 9.5 UNIT(S)/HR: 5000 INJECTION INTRAVENOUS; SUBCUTANEOUS at 04:38

## 2019-10-16 RX ADMIN — WARFARIN SODIUM 7.5 MILLIGRAM(S): 2.5 TABLET ORAL at 21:30

## 2019-10-16 RX ADMIN — Medication 81 MILLIGRAM(S): at 13:08

## 2019-10-16 NOTE — PROGRESS NOTE ADULT - PROBLEM SELECTOR PLAN 1
discussed with pt in detail the finding of Jak2 mutation, the fact that she probably has Essential Thrombocytosis and she will need to continue daily Aspirin 81 mg

## 2019-10-16 NOTE — PROGRESS NOTE ADULT - PROBLEM SELECTOR PLAN 1
Patient with progressively worsening headache x3 months with recent diagnosis as outpatient of sinus cavernous thrombosis, started on lovenox as outpatient with imaging showing progression of venous sinus thrombosis now on hep gtt.  -hep gtt with full anti-coagulation goal ptt 58-99, being transitioned to coumadin.   -INR at 1.27 this am, will dose coumadin 7.5mg tonight  -asa 81 for now, JAK2 positive Patient with progressively worsening headache x3 months with recent diagnosis as outpatient of sinus cavernous thrombosis, started on lovenox as outpatient with imaging showing progression of venous sinus thrombosis now on hep gtt.  -hep gtt with full anti-coagulation goal ptt 58-99, being transitioned to coumadin.   -INR at 1.27 this am, will dose coumadin 7.5mg tonight (s/p 2 10 mg doses)  -asa 81 for now, JAK2 positive

## 2019-10-16 NOTE — PROGRESS NOTE ADULT - PROBLEM SELECTOR PLAN 9
Patient afebrile throughout hospital course with persistent white count, no S&S of infectious etiology, no cough, ho hematuria, no rashes. likely 2/2 to steroids  -trend CBC. 16.47 today.   -Heme also following.

## 2019-10-16 NOTE — PROGRESS NOTE ADULT - PROBLEM SELECTOR PLAN 1
Patient with progressively worsening headache x3 months with recent diagnosis as outpatient of sinus cavernous thrombosis, started on lovenox as outpatient with imaging showing progression of venous sinus thrombosis now on hep gtt.  -hep gtt with full anti-coagulation goal ptt 58-99, being transitioned to coumadin.   -INR at 1.27 this am s/p 2 doses of coumadin 10 mg.   -Daily PT/PTT/INR in AM - goal INR 2-3  -D/c heparin when INR 2-3 for 48 hrs on coumadin  -asa 81 for now, JAK2 positive  -Tyleonol 325 PRN for pain  -Fioricet q4h for headache Patient with progressively worsening headache x3 months with recent diagnosis as outpatient of sinus cavernous thrombosis, started on lovenox as outpatient with imaging showing progression of venous sinus thrombosis now on hep gtt.  -hep gtt with full anti-coagulation goal ptt 58-99, being transitioned to coumadin.   -INR at 1.27 this am s/p 2 doses of coumadin 10 mg. Planned for 7.5 mg tonight 10/16  -Daily PT/PTT/INR in AM - goal INR 2-3  -D/c heparin when INR 2-3 for 48 hrs on coumadin then continue with stable dose.  -asa 81 for now, JAK2 positive  -Tyleonol 325 PRN for pain  -Fioricet q4h for headache (none needed so far)  -C/w acetazolamide 250 mg q 12 h

## 2019-10-16 NOTE — PROGRESS NOTE ADULT - SUBJECTIVE AND OBJECTIVE BOX
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 Tejeda) who p/w worsening headache on 10/9. Pt was started on heparin gtt given possible enlargement of thrombosis on Lovenox.  Now being bridged to warfarin. JAK2 was positive.     SUBJECTIVE:  Patient seen and examined at bedside. She appears comfortable and in no acute distress. She denies any headaches, chest pain, shortness of breath, palpitations, abdominal pain, n/v/d/c, edema. She has not required any fioricet or acetaminophen for headaches.     ICU Vital Signs Last 24 Hrs  T(C): 36.5 (16 Oct 2019 13:48), Max: 36.8 (16 Oct 2019 10:00)  T(F): 97.7 (16 Oct 2019 13:48), Max: 98.2 (16 Oct 2019 10:00)  HR: 68 (16 Oct 2019 13:44) (44 - 68)  BP: 118/70 (16 Oct 2019 13:44) (116/60 - 135/52)  BP(mean): 88 (16 Oct 2019 13:44) (75 - 88)  ABP: --  ABP(mean): --  RR: 22 (16 Oct 2019 13:44) (22 - 35)  SpO2: 94% (16 Oct 2019 13:44) (94% - 94%)    I&O's Summary    15 Oct 2019 07:01  -  16 Oct 2019 07:00  --------------------------------------------------------  IN: 3228.5 mL / OUT: 1900 mL / NET: 1328.5 mL    16 Oct 2019 07:01  -  16 Oct 2019 16:49  --------------------------------------------------------  IN: 840 mL / OUT: 0 mL / NET: 840 mL      PHYSICAL EXAM:  Constitutional: NAD, comfortable in bed.  HEENT: NC/AT, PERRLA, EOMI, no conjunctival pallor or scleral icterus, MMM  Neck: Supple, no JVD  Respiratory: CTA B/L. No w/r/r.   Cardiovascular: RRR, normal S1 and S2, no m/r/g.   Gastrointestinal: +BS, soft NTND, no guarding or rebound tenderness, no palpable masses   Extremities: wwp; no cyanosis, clubbing or edema.   Vascular: Pulses equal and strong throughout.   Neurological: AAOx3, no CN deficits, strength and sensation intact throughout.   Skin: No gross skin abnormalities or rashes    LABS:                        13.1   16.47 )-----------( 658      ( 16 Oct 2019 06:22 )             42.3     10-16    141  |  110<H>  |  16  ----------------------------<  96  3.9   |  18<L>  |  0.80    Ca    8.5      16 Oct 2019 06:22  Mg     1.9     10-16      PT/INR - ( 16 Oct 2019 06:22 )   PT: 14.5 sec;   INR: 1.27          PTT - ( 16 Oct 2019 12:48 )  PTT:73.9 sec        RADIOLOGY & ADDITIONAL TESTS: reviewed        MEDICATIONS  (STANDING):  acetazolamide    Tablet 250 milliGRAM(s) Oral every 12 hours  aspirin enteric coated 81 milliGRAM(s) Oral daily  docusate sodium 100 milliGRAM(s) Oral three times a day  heparin  Infusion 1000 Unit(s)/Hr (10 mL/Hr) IV Continuous <Continuous>  influenza   Vaccine 0.5 milliLiter(s) IntraMuscular once  levothyroxine 200 MICROGram(s) Oral daily  polyethylene glycol 3350 17 Gram(s) Oral daily  warfarin 7.5 milliGRAM(s) Oral once    MEDICATIONS  (PRN):  acetaminophen   Tablet .. 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 Tejeda) who p/w worsening headache on 10/9. Pt was started on heparin gtt given possible enlargement of thrombosis on Lovenox.  Now being bridged to warfarin. JAK2 was positive.     SUBJECTIVE:  Patient seen and examined at bedside. She appears comfortable and in no acute distress. She denies any headaches, chest pain, shortness of breath, palpitations, abdominal pain, n/v/d/c, edema. She has not required any fioricet or acetaminophen for headaches.     ICU Vital Signs Last 24 Hrs  T(C): 36.5 (16 Oct 2019 13:48), Max: 36.8 (16 Oct 2019 10:00)  T(F): 97.7 (16 Oct 2019 13:48), Max: 98.2 (16 Oct 2019 10:00)  HR: 68 (16 Oct 2019 13:44) (44 - 68)  BP: 118/70 (16 Oct 2019 13:44) (116/60 - 135/52)  BP(mean): 88 (16 Oct 2019 13:44) (75 - 88)  ABP: --  ABP(mean): --  RR: 22 (16 Oct 2019 13:44) (22 - 35)  SpO2: 94% (16 Oct 2019 13:44) (94% - 94%)    I&O's Summary    15 Oct 2019 07:01  -  16 Oct 2019 07:00  --------------------------------------------------------  IN: 3228.5 mL / OUT: 1900 mL / NET: 1328.5 mL    16 Oct 2019 07:01  -  16 Oct 2019 16:49  --------------------------------------------------------  IN: 840 mL / OUT: 0 mL / NET: 840 mL      PHYSICAL EXAM:  Constitutional: NAD, comfortable in bed.  HEENT: NC/AT, PERRLA, EOMI, no conjunctival pallor or scleral icterus, MMM  Neck: Supple, no JVD  Respiratory: CTA B/L. No w/r/r.   Cardiovascular: RRR, normal S1 and S2, no m/r/g.   Gastrointestinal: +BS, soft NTND, no guarding or rebound tenderness, no palpable masses   Extremities: wwp; no cyanosis, clubbing or edema.   Vascular: Pulses equal and strong throughout.   Skin: No gross skin abnormalities or rashes  Neurological:  - Mental Status: AAOx3; speech is fluent with intact naming, repetition, and comprehension, intact serial 7s  - Cranial Nerves II - XII:  II: PEERLA; visual fields are full to confrontation  III, IV, VI: EOMI, no nystagmus appreciated  V: facial sensation intact to light touch V1-V3 b/l  VII: no ptosis, no facial droop, symmetric eyebrow raise and closure  VIII: hearing intact to finger rub b/l  IX, X: uvula is midline, soft palate rises symmetrically  XI: head turning and shoulder shrug intact b/l  XII: tongue protrusion midline  - Motor: strength is 5/5 b/l LLE & RLE, 5/5 b/l LUE & RUE. normal muscle bulk and tone throughout, no pronator drift      LABS:                        13.1   16.47 )-----------( 658      ( 16 Oct 2019 06:22 )             42.3     10-16    141  |  110<H>  |  16  ----------------------------<  96  3.9   |  18<L>  |  0.80    Ca    8.5      16 Oct 2019 06:22  Mg     1.9     10-16      PT/INR - ( 16 Oct 2019 06:22 )   PT: 14.5 sec;   INR: 1.27          PTT - ( 16 Oct 2019 12:48 )  PTT:73.9 sec        RADIOLOGY & ADDITIONAL TESTS: reviewed        MEDICATIONS  (STANDING):  acetazolamide    Tablet 250 milliGRAM(s) Oral every 12 hours  aspirin enteric coated 81 milliGRAM(s) Oral daily  docusate sodium 100 milliGRAM(s) Oral three times a day  heparin  Infusion 1000 Unit(s)/Hr (10 mL/Hr) IV Continuous <Continuous>  influenza   Vaccine 0.5 milliLiter(s) IntraMuscular once  levothyroxine 200 MICROGram(s) Oral daily  polyethylene glycol 3350 17 Gram(s) Oral daily  warfarin 7.5 milliGRAM(s) Oral once    MEDICATIONS  (PRN):  acetaminophen   Tablet .. 325 milliGRAM(s) Oral every 4 hours PRN Mild Pain (1-3)  acetaminophen 300 mG/butalbital 50 mG/ caffeine 40 mG 1 Capsule(s) Oral every 4 hours PRN headache  bisacodyl Suppository 10 milliGRAM(s) Rectal daily PRN Constipation  ondansetron Injectable 4 milliGRAM(s) IV Push every 6 hours PRN Nausea and/or Vomiting

## 2019-10-16 NOTE — PROGRESS NOTE ADULT - SUBJECTIVE AND OBJECTIVE BOX
HEALTH ISSUES - PROBLEM Dx:  Essential thrombocytosis: Essential thrombocytosis  Smoking: Smoking  Other elevated white blood cell (WBC) count: Other elevated white blood cell (WBC) count  Overweight (BMI 25.0-29.9): Overweight (BMI 25.0-29.9)  Mild intermittent asthma without complication: Mild intermittent asthma without complication  Other headache syndrome: Other headache syndrome  Hypothyroid: Hypothyroid  Prophylactic measure: Prophylactic measure  Asthma: Asthma  Hyperchloremic metabolic acidosis: Hyperchloremic metabolic acidosis  Bradycardia: Bradycardia  Leukocytosis: Leukocytosis  Headache: Headache  Thrombocytosis: Thrombocytosis  Thyroid carcinoma: Thyroid carcinoma  Brain tumor: Brain tumor  Thrombosis, superior sagittal sinus: Thrombosis, superior sagittal sinus          CHEMOTHERAPY REGIMEN:        Day:                          Diet:  Protocol:                                    IVF:      MEDICATIONS  (STANDING):  acetazolamide    Tablet 250 milliGRAM(s) Oral every 12 hours  aspirin enteric coated 81 milliGRAM(s) Oral daily  docusate sodium 100 milliGRAM(s) Oral three times a day  heparin  Infusion 1000 Unit(s)/Hr (10 mL/Hr) IV Continuous <Continuous>  influenza   Vaccine 0.5 milliLiter(s) IntraMuscular once  levothyroxine 200 MICROGram(s) Oral daily  polyethylene glycol 3350 17 Gram(s) Oral daily  warfarin 7.5 milliGRAM(s) Oral once    MEDICATIONS  (PRN):  acetaminophen   Tablet .. 325 milliGRAM(s) Oral every 4 hours PRN Mild Pain (1-3)  acetaminophen 300 mG/butalbital 50 mG/ caffeine 40 mG 1 Capsule(s) Oral every 4 hours PRN headache  bisacodyl Suppository 10 milliGRAM(s) Rectal daily PRN Constipation  ondansetron Injectable 4 milliGRAM(s) IV Push every 6 hours PRN Nausea and/or Vomiting      Allergies    penicillin (Hives)    Intolerances        DVT Prophylaxis: [ ] YES [ ] NO      Antibiotics: [ ] YES [ ] NO    Pain Scale (1-10):       Location:    Vital Signs Last 24 Hrs  T(C): 36.8 (16 Oct 2019 17:25), Max: 36.8 (16 Oct 2019 10:00)  T(F): 98.2 (16 Oct 2019 17:25), Max: 98.2 (16 Oct 2019 10:00)  HR: 68 (16 Oct 2019 13:44) (44 - 68)  BP: 118/70 (16 Oct 2019 13:44) (116/60 - 135/52)  BP(mean): 88 (16 Oct 2019 13:44) (75 - 88)  RR: 22 (16 Oct 2019 13:44) (22 - 35)  SpO2: 94% (16 Oct 2019 13:44) (94% - 94%)    Drug Dosing Weight  Height (cm): 160.02 (08 Oct 2019 22:41)  Weight (kg): 64.9 (08 Oct 2019 22:41)  BMI (kg/m2): 25.3 (08 Oct 2019 22:41)  BSA (m2): 1.68 (08 Oct 2019 22:41)     Physical Exam:  · Constitutional	detailed exam  · Constitutional Details	well-developed; well-groomed  · Eyes	EOMI; PERRL; no drainage or redness  · ENMT Comments	dry mucous membranes  · Respiratory	detailed exam  · Respiratory Comments	normal breath sounds at the lung bases bilaterally  · Cardiovascular	Regular rate & rhythm, normal S1, S2; no murmurs, gallops or rubs; no S3, S4  · Abd-Soft non tender  ·Ext-no edema, clubbing or cyanosis    URINARY CATHETER: [ ] YES [ ] NO     LABS:  CBC Full  -  ( 16 Oct 2019 06:22 )  WBC Count : 16.47 K/uL  RBC Count : 4.58 M/uL  Hemoglobin : 13.1 g/dL  Hematocrit : 42.3 %  Platelet Count - Automated : 658 K/uL  Mean Cell Volume : 92.4 fl  Mean Cell Hemoglobin : 28.6 pg  Mean Cell Hemoglobin Concentration : 31.0 gm/dL  Auto Neutrophil # : x  Auto Lymphocyte # : x  Auto Monocyte # : x  Auto Eosinophil # : x  Auto Basophil # : x  Auto Neutrophil % : x  Auto Lymphocyte % : x  Auto Monocyte % : x  Auto Eosinophil % : x  Auto Basophil % : x    10-16    141  |  110<H>  |  16  ----------------------------<  96  3.9   |  18<L>  |  0.80    Ca    8.5      16 Oct 2019 06:22  Mg     1.9     10-16      PT/INR - ( 16 Oct 2019 06:22 )   PT: 14.5 sec;   INR: 1.27          PTT - ( 16 Oct 2019 12:48 )  PTT:73.9 sec      CULTURES:    RADIOLOGY & ADDITIONAL STUDIES:

## 2019-10-16 NOTE — PROGRESS NOTE ADULT - SUBJECTIVE AND OBJECTIVE BOX
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 Tejeda) who p/w worsening headache on 10/9. Pt was started on heparin gtt given possible enlargement of thrombosis on Lovenox.  Now being bridged to warfarin. JAK2 was positive.     SUBJECTIVE:  Patient seen and examined at bedside. She appears comfortable and in no acute distress. She denies any headaches, chest pain, shortness of breath, palpitations, abdominal pain, n/v/d/c, edema.    Vital Signs Last 12 Hrs  T(F): 97.7 (10-16-19 @ 13:48), Max: 98.2 (10-16-19 @ 10:00)  HR: 68 (10-16-19 @ 13:44) (54 - 68)  BP: 118/70 (10-16-19 @ 13:44) (118/70 - 119/58)  BP(mean): 88 (10-16-19 @ 13:44) (82 - 88)  RR: 22 (10-16-19 @ 13:44) (22 - 28)  SpO2: 94% (10-16-19 @ 13:44) (94% - 94%)  I&O's Summary    15 Oct 2019 07:01  -  16 Oct 2019 07:00  --------------------------------------------------------  IN: 3228.5 mL / OUT: 1900 mL / NET: 1328.5 mL    16 Oct 2019 07:01  -  16 Oct 2019 16:19  --------------------------------------------------------  IN: 840 mL / OUT: 0 mL / NET: 840 mL        PHYSICAL EXAM:  Constitutional: NAD, comfortable in bed.  HEENT: NC/AT, PERRLA, EOMI, no conjunctival pallor or scleral icterus, MMM  Neck: Supple, no JVD  Respiratory: CTA B/L. No w/r/r.   Cardiovascular: RRR, normal S1 and S2, no m/r/g.   Gastrointestinal: +BS, soft NTND, no guarding or rebound tenderness, no palpable masses   Extremities: wwp; no cyanosis, clubbing or edema.   Vascular: Pulses equal and strong throughout.   Neurological: AAOx3, no CN deficits, strength and sensation intact throughout.   Skin: No gross skin abnormalities or rashes        LABS:                        13.1   16.47 )-----------( 658      ( 16 Oct 2019 06:22 )             42.3     10-16    141  |  110<H>  |  16  ----------------------------<  96  3.9   |  18<L>  |  0.80    Ca    8.5      16 Oct 2019 06:22  Mg     1.9     10-16      PT/INR - ( 16 Oct 2019 06:22 )   PT: 14.5 sec;   INR: 1.27          PTT - ( 16 Oct 2019 12:48 )  PTT:73.9 sec        RADIOLOGY & ADDITIONAL TESTS:    MEDICATIONS  (STANDING):  acetazolamide    Tablet 250 milliGRAM(s) Oral every 12 hours  aspirin enteric coated 81 milliGRAM(s) Oral daily  docusate sodium 100 milliGRAM(s) Oral three times a day  heparin  Infusion 1000 Unit(s)/Hr (10 mL/Hr) IV Continuous <Continuous>  influenza   Vaccine 0.5 milliLiter(s) IntraMuscular once  levothyroxine 200 MICROGram(s) Oral daily  polyethylene glycol 3350 17 Gram(s) Oral daily  warfarin 7.5 milliGRAM(s) Oral once    MEDICATIONS  (PRN):  acetaminophen   Tablet .. 325 milliGRAM(s) Oral every 4 hours PRN Mild Pain (1-3)  acetaminophen 300 mG/butalbital 50 mG/ caffeine 40 mG 1 Capsule(s) Oral every 4 hours PRN headache  bisacodyl Suppository 10 milliGRAM(s) Rectal daily PRN Constipation  ondansetron Injectable 4 milliGRAM(s) IV Push every 6 hours PRN Nausea and/or Vomiting

## 2019-10-16 NOTE — PROGRESS NOTE ADULT - PROBLEM SELECTOR PLAN 9
Patient afebrile throughout hospital course with persistent white count, no S&S of infectious etiology, no cough, ho hematuria, no rashes, steroids vs hematologic etiology?  -trend CBC.  -Heme recs appreciated

## 2019-10-16 NOTE — PROGRESS NOTE ADULT - PROBLEM SELECTOR PLAN 1
Patient with progressively worsening headache x3 months with recent diagnosis as outpatient of sinus cavernous thrombosis, started on lovenox as outpatient with imaging as above showing progression of venous sinus thrombosis now on hep gtt.  -c/w hep gtt   -c/w asa 81  -decadron taper finished on 10/15  -Diamox 250mg BID for potential benefit of decreased CSF production.

## 2019-10-16 NOTE — PROGRESS NOTE ADULT - SUBJECTIVE AND OBJECTIVE BOX
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 Tejeda) who p/w worsening headache on 10/9. Pt was started on heparin gtt given possible enlargement of thrombosis on Lovenox.  Now being bridged to warfarin. JAK2 was +    Subjective:  Pt has no acute complaints. Reports that her headache has significantly improved. Denies chest pain. 12 pt ROS is otherwise negative.    Allergies    penicillin (Hives)    Intolerances    MEDICATIONS  (STANDING):  acetazolamide    Tablet 250 milliGRAM(s) Oral every 12 hours  aspirin enteric coated 81 milliGRAM(s) Oral daily  docusate sodium 100 milliGRAM(s) Oral three times a day  heparin  Infusion 1000 Unit(s)/Hr (10 mL/Hr) IV Continuous <Continuous>  influenza   Vaccine 0.5 milliLiter(s) IntraMuscular once  lactated ringers. 1000 milliLiter(s) (150 mL/Hr) IV Continuous <Continuous>  levothyroxine 200 MICROGram(s) Oral daily  polyethylene glycol 3350 17 Gram(s) Oral daily    MEDICATIONS  (PRN):  acetaminophen   Tablet .. 325 milliGRAM(s) Oral every 4 hours PRN Mild Pain (1-3)  acetaminophen 300 mG/butalbital 50 mG/ caffeine 40 mG 1 Capsule(s) Oral every 4 hours PRN headache  bisacodyl Suppository 10 milliGRAM(s) Rectal daily PRN Constipation  ondansetron Injectable 4 milliGRAM(s) IV Push every 6 hours PRN Nausea and/or Vomiting    Drug Dosing Weight  Height (cm): 160.02 (08 Oct 2019 22:41)  Weight (kg): 64.9 (08 Oct 2019 22:41)  BMI (kg/m2): 25.3 (08 Oct 2019 22:41)  BSA (m2): 1.68 (08 Oct 2019 22:41)    PAST MEDICAL & SURGICAL HISTORY:  Thyroid carcinoma  Asthma  Thrombosis, superior sagittal sinus  Brain tumor: meningioma  H/O thyroidectomy    FAMILY HISTORY: no cardiac disease    SOCIAL HISTORY: quit smoking 1 week ago    Vital Signs Last 24 Hrs  T(C): 36.5 (16 Oct 2019 06:30), Max: 36.7 (15 Oct 2019 17:20)  T(F): 97.7 (16 Oct 2019 06:30), Max: 98.1 (15 Oct 2019 17:20)  HR: 44 (16 Oct 2019 04:15) (44 - 54)  BP: 116/60 (16 Oct 2019 04:15) (106/52 - 155/83)  BP(mean): 82 (16 Oct 2019 04:15) (75 - 105)  RR: 24 (16 Oct 2019 04:15) (24 - 35)  SpO2: 95% (15 Oct 2019 16:30) (95% - 97%)    PHYSICAL EXAM:    Constitutional: NAD  Eyes: PERRLA  ENMT: MMM  Neck: supple  Back: midline  Respiratory: CTA b/l  Cardiovascular: rrr, s1s2, no m/r/g  Gastrointestinal: soft, NTND, + BS  Extremities: warm  Vascular: + 2 pulses radial, no edema  Neurological: AAO x 4, 5/5 strength in all extremities  Skin: no rash  Lymph Nodes: no LAD  Musculoskeletal: no joint swelling  Psychiatric: normal affect    LABS:                        13.1   16.47 )-----------( 658      ( 16 Oct 2019 06:22 )             42.3   10-16    141  |  110<H>  |  16  ----------------------------<  96  3.9   |  18<L>  |  0.80    Ca    8.5      16 Oct 2019 06:22  Mg     1.9     10-16    PT/INR - ( 16 Oct 2019 06:22 )   PT: 14.5 sec;   INR: 1.27          PTT - ( 16 Oct 2019 06:22 )  PTT:57.8 sec  EKG:  Sinus bradycardia    ECHO, US:  US duplex:  No vein thrombosis seen.    RADIOLOGY:  CT head w/o contrast:  No intracranial hemorrhage. Stable exam.    MRI brain:  The MRI examination of the brain demonstrates the ventricles, cisternal spaces, and cortical sulci to be appropriate for the patient's stated age. There is no midline shift or extra-axial collection. There remains a few punctate foci of increased signal within the periventricular and subcortical white matter which is nonspecific and may represent the sequela of small vessel ischemic disease in this patient. The diffusion-weighted images demonstrate no acute ischemia. The GRE images demonstrate no hemorrhagic products. There is hyperintense FLAIR   signal within the superior sagittal sinus which correlates with patient's known sinus thrombosis. There is a isointense soft tissue mass involving the left cavernous sinuses with extension along the left middle cranial fossa with narrowing of the left internal carotid artery flow void. The left carotid flow void is attenuated with hyperintense signal with the petrous and cavernous   segments. These findings are compatible with the patient's underlying cavernous sinuses meningioma.    CTA head/neck:  Stable attenuated caliber to the distal left cervical internal carotid artery with focal area of high-grade stenosis/occlusion involving the left cavernous segment secondary to underlying cavernous sinus meningioma. Relatively stable thrombosis involving nearly the 2/3 of the superior sagittal sinus. Small filling defect within the underdeveloped distal right transverse sinus which may represent additional thrombus versus arachnoid granulation.

## 2019-10-17 LAB
ANION GAP SERPL CALC-SCNC: 12 MMOL/L — SIGNIFICANT CHANGE UP (ref 5–17)
APTT BLD: 118.9 SEC — HIGH (ref 27.5–36.3)
APTT BLD: 76.5 SEC — HIGH (ref 27.5–36.3)
APTT BLD: 87.7 SEC — HIGH (ref 27.5–36.3)
BUN SERPL-MCNC: 17 MG/DL — SIGNIFICANT CHANGE UP (ref 7–23)
CALCIUM SERPL-MCNC: 8.8 MG/DL — SIGNIFICANT CHANGE UP (ref 8.4–10.5)
CHLORIDE SERPL-SCNC: 109 MMOL/L — HIGH (ref 96–108)
CO2 SERPL-SCNC: 18 MMOL/L — LOW (ref 22–31)
CREAT SERPL-MCNC: 0.94 MG/DL — SIGNIFICANT CHANGE UP (ref 0.5–1.3)
GLUCOSE SERPL-MCNC: 89 MG/DL — SIGNIFICANT CHANGE UP (ref 70–99)
HCT VFR BLD CALC: 46.9 % — HIGH (ref 34.5–45)
HGB BLD-MCNC: 14.3 G/DL — SIGNIFICANT CHANGE UP (ref 11.5–15.5)
INR BLD: 1.52 — HIGH (ref 0.88–1.16)
MAGNESIUM SERPL-MCNC: 2.1 MG/DL — SIGNIFICANT CHANGE UP (ref 1.6–2.6)
MCHC RBC-ENTMCNC: 28.6 PG — SIGNIFICANT CHANGE UP (ref 27–34)
MCHC RBC-ENTMCNC: 30.5 GM/DL — LOW (ref 32–36)
MCV RBC AUTO: 93.8 FL — SIGNIFICANT CHANGE UP (ref 80–100)
NRBC # BLD: 0 /100 WBCS — SIGNIFICANT CHANGE UP (ref 0–0)
PHOSPHATE SERPL-MCNC: 4.6 MG/DL — HIGH (ref 2.5–4.5)
PLATELET # BLD AUTO: 777 K/UL — HIGH (ref 150–400)
POTASSIUM SERPL-MCNC: 4.1 MMOL/L — SIGNIFICANT CHANGE UP (ref 3.5–5.3)
POTASSIUM SERPL-SCNC: 4.1 MMOL/L — SIGNIFICANT CHANGE UP (ref 3.5–5.3)
PROTHROM AB SERPL-ACNC: 17.4 SEC — HIGH (ref 10–12.9)
RBC # BLD: 5 M/UL — SIGNIFICANT CHANGE UP (ref 3.8–5.2)
RBC # FLD: 14.1 % — SIGNIFICANT CHANGE UP (ref 10.3–14.5)
SODIUM SERPL-SCNC: 139 MMOL/L — SIGNIFICANT CHANGE UP (ref 135–145)
WBC # BLD: 17.54 K/UL — HIGH (ref 3.8–10.5)
WBC # FLD AUTO: 17.54 K/UL — HIGH (ref 3.8–10.5)

## 2019-10-17 PROCEDURE — 99232 SBSQ HOSP IP/OBS MODERATE 35: CPT

## 2019-10-17 PROCEDURE — 99233 SBSQ HOSP IP/OBS HIGH 50: CPT

## 2019-10-17 RX ORDER — PANTOPRAZOLE SODIUM 20 MG/1
40 TABLET, DELAYED RELEASE ORAL
Refills: 0 | Status: DISCONTINUED | OUTPATIENT
Start: 2019-10-17 | End: 2019-10-20

## 2019-10-17 RX ORDER — HEPARIN SODIUM 5000 [USP'U]/ML
900 INJECTION INTRAVENOUS; SUBCUTANEOUS
Qty: 25000 | Refills: 0 | Status: DISCONTINUED | OUTPATIENT
Start: 2019-10-17 | End: 2019-10-20

## 2019-10-17 RX ORDER — WARFARIN SODIUM 2.5 MG/1
9 TABLET ORAL ONCE
Refills: 0 | Status: DISCONTINUED | OUTPATIENT
Start: 2019-10-17 | End: 2019-10-17

## 2019-10-17 RX ORDER — WARFARIN SODIUM 2.5 MG/1
10 TABLET ORAL ONCE
Refills: 0 | Status: COMPLETED | OUTPATIENT
Start: 2019-10-17 | End: 2019-10-17

## 2019-10-17 RX ADMIN — Medication 100 MILLIGRAM(S): at 14:11

## 2019-10-17 RX ADMIN — WARFARIN SODIUM 10 MILLIGRAM(S): 2.5 TABLET ORAL at 22:00

## 2019-10-17 RX ADMIN — ACETAZOLAMIDE 250 MILLIGRAM(S): 250 TABLET ORAL at 06:54

## 2019-10-17 RX ADMIN — POLYETHYLENE GLYCOL 3350 17 GRAM(S): 17 POWDER, FOR SOLUTION ORAL at 12:03

## 2019-10-17 RX ADMIN — Medication 100 MILLIGRAM(S): at 06:54

## 2019-10-17 RX ADMIN — ACETAZOLAMIDE 250 MILLIGRAM(S): 250 TABLET ORAL at 17:22

## 2019-10-17 RX ADMIN — HEPARIN SODIUM 900 UNIT(S)/HR: 5000 INJECTION INTRAVENOUS; SUBCUTANEOUS at 22:52

## 2019-10-17 RX ADMIN — PANTOPRAZOLE SODIUM 40 MILLIGRAM(S): 20 TABLET, DELAYED RELEASE ORAL at 17:22

## 2019-10-17 RX ADMIN — Medication 200 MICROGRAM(S): at 06:54

## 2019-10-17 RX ADMIN — Medication 81 MILLIGRAM(S): at 12:03

## 2019-10-17 RX ADMIN — Medication 100 MILLIGRAM(S): at 22:47

## 2019-10-17 NOTE — PROGRESS NOTE ADULT - SUBJECTIVE AND OBJECTIVE BOX
Subjective: Patient seen at bedside. Has slight head pressure that has been consistent throughout admission. Ambulating to bathroom without issue. Last BM two days ago.    ROS: Denies blurry vision, HA, dizziness, lightheadedness, CP, SOB, abdominal pain    VITALS  Vital Signs Last 24 Hrs  T(C): 36.8 (17 Oct 2019 14:22), Max: 37.1 (16 Oct 2019 19:24)  T(F): 98.3 (17 Oct 2019 14:22), Max: 98.7 (16 Oct 2019 19:24)  HR: 52 (17 Oct 2019 14:22) (42 - 60)  BP: 123/65 (17 Oct 2019 14:22) (117/73 - 126/81)  BP(mean): --  RR: 18 (17 Oct 2019 14:22) (18 - 19)  SpO2: 96% (17 Oct 2019 14:22) (95% - 97%)    CAPILLARY BLOOD GLUCOSE          PHYSICAL EXAM  General appearance: sitting comfortably in bed, NAD  Respiratory: breath sounds clear to auscultation throughout all lung fields, no accessory muscle use, no wheezing/rales/rhonchi   Cardiovascular: decreased rate and regular rhythm, normal S1/S2, no murmurs, rubs or gallops appreciated  Gastrointestinal: soft, non-tender, non-distended, normoactive bowel sounds  Extremities: no LE edema, 2+ PT pulses  Neurological: AOx3, CNII-XII grossly intact; no obvious focal deficits    MEDICATIONS  (STANDING):  acetazolamide    Tablet 250 milliGRAM(s) Oral every 12 hours  aspirin enteric coated 81 milliGRAM(s) Oral daily  docusate sodium 100 milliGRAM(s) Oral three times a day  heparin  Infusion. 900 Unit(s)/Hr (9 mL/Hr) IV Continuous <Continuous>  influenza   Vaccine 0.5 milliLiter(s) IntraMuscular once  levothyroxine 200 MICROGram(s) Oral daily  pantoprazole    Tablet 40 milliGRAM(s) Oral before breakfast  polyethylene glycol 3350 17 Gram(s) Oral daily  warfarin 10 milliGRAM(s) Oral once    MEDICATIONS  (PRN):  acetaminophen   Tablet .. 325 milliGRAM(s) Oral every 4 hours PRN Mild Pain (1-3)  acetaminophen 300 mG/butalbital 50 mG/ caffeine 40 mG 1 Capsule(s) Oral every 4 hours PRN headache  bisacodyl Suppository 10 milliGRAM(s) Rectal daily PRN Constipation  ondansetron Injectable 4 milliGRAM(s) IV Push every 6 hours PRN Nausea and/or Vomiting      penicillin (Hives)      LABS                        14.3   17.54 )-----------( 777      ( 17 Oct 2019 06:53 )             46.9     10-17    139  |  109<H>  |  17  ----------------------------<  89  4.1   |  18<L>  |  0.94    Ca    8.8      17 Oct 2019 06:53  Phos  4.6     10-17  Mg     2.1     10-17      PT/INR - ( 17 Oct 2019 06:53 )   PT: 17.4 sec;   INR: 1.52          PTT - ( 17 Oct 2019 17:14 )  PTT:76.5 sec          PROCEDURES/IMAGING: reviewed

## 2019-10-17 NOTE — PROGRESS NOTE ADULT - SUBJECTIVE AND OBJECTIVE BOX
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  Essential thrombocytosis: Essential thrombocytosis  Smoking: Smoking  Other elevated white blood cell (WBC) count: Other elevated white blood cell (WBC) count  Overweight (BMI 25.0-29.9): Overweight (BMI 25.0-29.9)  Mild intermittent asthma without complication: Mild intermittent asthma without complication  Other headache syndrome: Other headache syndrome  Hypothyroid: Hypothyroid  Prophylactic measure: Prophylactic measure  Asthma: Asthma  Hyperchloremic metabolic acidosis: Hyperchloremic metabolic acidosis  Bradycardia: Bradycardia  Leukocytosis: Leukocytosis  Headache: Headache  Thrombocytosis: Thrombocytosis  Thyroid carcinoma: Thyroid carcinoma  Brain tumor: Brain tumor  Thrombosis, superior sagittal sinus: Thrombosis, superior sagittal sinus          CHEMOTHERAPY REGIMEN:        Day:                          Diet:  Protocol:                                    IVF:      MEDICATIONS  (STANDING):  acetazolamide    Tablet 250 milliGRAM(s) Oral every 12 hours  aspirin enteric coated 81 milliGRAM(s) Oral daily  docusate sodium 100 milliGRAM(s) Oral three times a day  heparin  Infusion. 900 Unit(s)/Hr (9 mL/Hr) IV Continuous <Continuous>  influenza   Vaccine 0.5 milliLiter(s) IntraMuscular once  levothyroxine 200 MICROGram(s) Oral daily  pantoprazole    Tablet 40 milliGRAM(s) Oral before breakfast  polyethylene glycol 3350 17 Gram(s) Oral daily  warfarin 10 milliGRAM(s) Oral once    MEDICATIONS  (PRN):  acetaminophen   Tablet .. 325 milliGRAM(s) Oral every 4 hours PRN Mild Pain (1-3)  acetaminophen 300 mG/butalbital 50 mG/ caffeine 40 mG 1 Capsule(s) Oral every 4 hours PRN headache  bisacodyl Suppository 10 milliGRAM(s) Rectal daily PRN Constipation  ondansetron Injectable 4 milliGRAM(s) IV Push every 6 hours PRN Nausea and/or Vomiting      Allergies    penicillin (Hives)    Intolerances        DVT Prophylaxis: [ ] YES [ ] NO      Antibiotics: [ ] YES [ ] NO    Pain Scale (1-10):       Location:    Vital Signs Last 24 Hrs  T(C): 36.8 (17 Oct 2019 14:22), Max: 37.1 (16 Oct 2019 19:24)  T(F): 98.3 (17 Oct 2019 14:22), Max: 98.7 (16 Oct 2019 19:24)  HR: 52 (17 Oct 2019 14:22) (42 - 60)  BP: 123/65 (17 Oct 2019 14:22) (117/73 - 126/81)  BP(mean): --  RR: 18 (17 Oct 2019 14:22) (18 - 19)  SpO2: 96% (17 Oct 2019 14:22) (95% - 97%)    Drug Dosing Weight  Height (cm): 160.02 (08 Oct 2019 22:41)  Weight (kg): 64.9 (08 Oct 2019 22:41)  BMI (kg/m2): 25.3 (08 Oct 2019 22:41)  BSA (m2): 1.68 (08 Oct 2019 22:41)     Physical Exam:  · Constitutional	detailed exam  · Constitutional Details	well-developed; well-groomed  · Eyes	EOMI; PERRL; no drainage or redness  · ENMT Comments	dry mucous membranes  · Respiratory	detailed exam  · Respiratory Comments	normal breath sounds at the lung bases bilaterally  · Cardiovascular	Regular rate & rhythm, normal S1, S2; no murmurs, gallops or rubs; no S3, S4  · Abd-Soft non tender  ·Ext-no edema, clubbing or cyanosis    URINARY CATHETER: [ ] YES [ ] NO     LABS:  CBC Full  -  ( 17 Oct 2019 06:53 )  WBC Count : 17.54 K/uL  RBC Count : 5.00 M/uL  Hemoglobin : 14.3 g/dL  Hematocrit : 46.9 %  Platelet Count - Automated : 777 K/uL  Mean Cell Volume : 93.8 fl  Mean Cell Hemoglobin : 28.6 pg  Mean Cell Hemoglobin Concentration : 30.5 gm/dL  Auto Neutrophil # : x  Auto Lymphocyte # : x  Auto Monocyte # : x  Auto Eosinophil # : x  Auto Basophil # : x  Auto Neutrophil % : x  Auto Lymphocyte % : x  Auto Monocyte % : x  Auto Eosinophil % : x  Auto Basophil % : x    10-17    139  |  109<H>  |  17  ----------------------------<  89  4.1   |  18<L>  |  0.94    Ca    8.8      17 Oct 2019 06:53  Phos  4.6     10-17  Mg     2.1     10-17      PT/INR - ( 17 Oct 2019 06:53 )   PT: 17.4 sec;   INR: 1.52          PTT - ( 17 Oct 2019 17:14 )  PTT:76.5 sec      CULTURES:    RADIOLOGY & ADDITIONAL STUDIES:

## 2019-10-17 NOTE — PROGRESS NOTE ADULT - ASSESSMENT
Patient is a 63F with a PMH of meningioma and recently found sinus sagittal thrombosis (on Lovenox) who presented with worsening HA and is now on heparin to coumadin bridge.    (Plan modified from Dr. Sorensen)

## 2019-10-17 NOTE — PROGRESS NOTE ADULT - PROBLEM SELECTOR PLAN 1
Patient with progressively worsening headache x3 months with recent diagnosis as outpatient of sinus cavernous thrombosis, started on lovenox as outpatient with imaging showing progression of venous sinus thrombosis now on hep gtt.  -10/17 4p PTT therapeutic (76.5)  -10/17 coumadin dose 10 mg  - heparin gtt @ 9cc/hr  -hep gtt with full anti-coagulation goal ptt 58-99, being transitioned to coumadin.   -Daily PT/PTT/INR in AM - goal INR 2-3  -D/c heparin when INR 2-3 for 48 hrs on coumadin then continue with stable dose.  -asa 81 for now, JAK2 positive  -Tylenol 325 PRN for pain  -Fioricet q4h for headache  -cont. acetazolamide 250 mg q 12 h

## 2019-10-18 ENCOUNTER — TRANSCRIPTION ENCOUNTER (OUTPATIENT)
Age: 64
End: 2019-10-18

## 2019-10-18 LAB
ANION GAP SERPL CALC-SCNC: 12 MMOL/L — SIGNIFICANT CHANGE UP (ref 5–17)
APTT BLD: 91.3 SEC — HIGH (ref 27.5–36.3)
APTT BLD: 96.4 SEC — HIGH (ref 27.5–36.3)
BUN SERPL-MCNC: 17 MG/DL — SIGNIFICANT CHANGE UP (ref 7–23)
CALCIUM SERPL-MCNC: 8.9 MG/DL — SIGNIFICANT CHANGE UP (ref 8.4–10.5)
CHLORIDE SERPL-SCNC: 108 MMOL/L — SIGNIFICANT CHANGE UP (ref 96–108)
CO2 SERPL-SCNC: 17 MMOL/L — LOW (ref 22–31)
CREAT SERPL-MCNC: 0.86 MG/DL — SIGNIFICANT CHANGE UP (ref 0.5–1.3)
GLUCOSE SERPL-MCNC: 95 MG/DL — SIGNIFICANT CHANGE UP (ref 70–99)
HCT VFR BLD CALC: 45.4 % — HIGH (ref 34.5–45)
HGB BLD-MCNC: 14.1 G/DL — SIGNIFICANT CHANGE UP (ref 11.5–15.5)
INR BLD: 1.75 — HIGH (ref 0.88–1.16)
MAGNESIUM SERPL-MCNC: 2.2 MG/DL — SIGNIFICANT CHANGE UP (ref 1.6–2.6)
MCHC RBC-ENTMCNC: 28.7 PG — SIGNIFICANT CHANGE UP (ref 27–34)
MCHC RBC-ENTMCNC: 31.1 GM/DL — LOW (ref 32–36)
MCV RBC AUTO: 92.3 FL — SIGNIFICANT CHANGE UP (ref 80–100)
NRBC # BLD: 0 /100 WBCS — SIGNIFICANT CHANGE UP (ref 0–0)
PLATELET # BLD AUTO: 759 K/UL — HIGH (ref 150–400)
POTASSIUM SERPL-MCNC: 4.1 MMOL/L — SIGNIFICANT CHANGE UP (ref 3.5–5.3)
POTASSIUM SERPL-SCNC: 4.1 MMOL/L — SIGNIFICANT CHANGE UP (ref 3.5–5.3)
PROTHROM AB SERPL-ACNC: 20.1 SEC — HIGH (ref 10–12.9)
RBC # BLD: 4.92 M/UL — SIGNIFICANT CHANGE UP (ref 3.8–5.2)
RBC # FLD: 14.3 % — SIGNIFICANT CHANGE UP (ref 10.3–14.5)
SODIUM SERPL-SCNC: 137 MMOL/L — SIGNIFICANT CHANGE UP (ref 135–145)
WBC # BLD: 17.08 K/UL — HIGH (ref 3.8–10.5)
WBC # FLD AUTO: 17.08 K/UL — HIGH (ref 3.8–10.5)

## 2019-10-18 PROCEDURE — 99232 SBSQ HOSP IP/OBS MODERATE 35: CPT

## 2019-10-18 PROCEDURE — 99233 SBSQ HOSP IP/OBS HIGH 50: CPT | Mod: GC

## 2019-10-18 PROCEDURE — 99231 SBSQ HOSP IP/OBS SF/LOW 25: CPT

## 2019-10-18 RX ORDER — ACETAZOLAMIDE 250 MG/1
1 TABLET ORAL
Qty: 0 | Refills: 0 | DISCHARGE
Start: 2019-10-18

## 2019-10-18 RX ORDER — ACETAMINOPHEN 500 MG
1 TABLET ORAL
Qty: 0 | Refills: 0 | DISCHARGE
Start: 2019-10-18

## 2019-10-18 RX ORDER — PANTOPRAZOLE SODIUM 20 MG/1
1 TABLET, DELAYED RELEASE ORAL
Qty: 0 | Refills: 0 | DISCHARGE
Start: 2019-10-18

## 2019-10-18 RX ORDER — POLYETHYLENE GLYCOL 3350 17 G/17G
17 POWDER, FOR SOLUTION ORAL
Qty: 0 | Refills: 0 | DISCHARGE
Start: 2019-10-18

## 2019-10-18 RX ORDER — WARFARIN SODIUM 2.5 MG/1
10 TABLET ORAL ONCE
Refills: 0 | Status: COMPLETED | OUTPATIENT
Start: 2019-10-18 | End: 2019-10-18

## 2019-10-18 RX ADMIN — Medication 81 MILLIGRAM(S): at 11:51

## 2019-10-18 RX ADMIN — Medication 100 MILLIGRAM(S): at 06:30

## 2019-10-18 RX ADMIN — POLYETHYLENE GLYCOL 3350 17 GRAM(S): 17 POWDER, FOR SOLUTION ORAL at 11:49

## 2019-10-18 RX ADMIN — ACETAZOLAMIDE 250 MILLIGRAM(S): 250 TABLET ORAL at 06:30

## 2019-10-18 RX ADMIN — Medication 200 MICROGRAM(S): at 06:30

## 2019-10-18 RX ADMIN — PANTOPRAZOLE SODIUM 40 MILLIGRAM(S): 20 TABLET, DELAYED RELEASE ORAL at 06:30

## 2019-10-18 RX ADMIN — Medication 100 MILLIGRAM(S): at 13:17

## 2019-10-18 RX ADMIN — ACETAZOLAMIDE 250 MILLIGRAM(S): 250 TABLET ORAL at 17:16

## 2019-10-18 RX ADMIN — Medication 100 MILLIGRAM(S): at 21:20

## 2019-10-18 RX ADMIN — WARFARIN SODIUM 10 MILLIGRAM(S): 2.5 TABLET ORAL at 21:20

## 2019-10-18 NOTE — PROGRESS NOTE ADULT - ASSESSMENT
Patient is a 63F with a PMH of meningioma and recently found sinus sagittal thrombosis (on Lovenox) who presented with worsening HA and is now on heparin to coumadin bridge.

## 2019-10-18 NOTE — PROGRESS NOTE ADULT - SUBJECTIVE AND OBJECTIVE BOX
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 Tejeda) who p/w worsening headache on 10/9. Pt was started on heparin gtt given possible enlargement of thrombosis on Lovenox.  Now being bridged to warfarin. JAK2 was +    Subjective:  Pt has no acute complaints. Reports that her headache has significantly improved. Denies chest pain. 12 pt ROS is otherwise negative.    Allergies    penicillin (Hives)    Intolerances    MEDICATIONS  (STANDING):  acetazolamide    Tablet 250 milliGRAM(s) Oral every 12 hours  aspirin enteric coated 81 milliGRAM(s) Oral daily  docusate sodium 100 milliGRAM(s) Oral three times a day  heparin  Infusion. 900 Unit(s)/Hr (9 mL/Hr) IV Continuous <Continuous>  influenza   Vaccine 0.5 milliLiter(s) IntraMuscular once  levothyroxine 200 MICROGram(s) Oral daily  pantoprazole    Tablet 40 milliGRAM(s) Oral before breakfast  polyethylene glycol 3350 17 Gram(s) Oral daily    MEDICATIONS  (PRN):  acetaminophen   Tablet .. 325 milliGRAM(s) Oral every 4 hours PRN Mild Pain (1-3)  acetaminophen 300 mG/butalbital 50 mG/ caffeine 40 mG 1 Capsule(s) Oral every 4 hours PRN headache  bisacodyl Suppository 10 milliGRAM(s) Rectal daily PRN Constipation  ondansetron Injectable 4 milliGRAM(s) IV Push every 6 hours PRN Nausea and/or Vomiting    Drug Dosing Weight  Height (cm): 160.02 (08 Oct 2019 22:41)  Weight (kg): 64.9 (08 Oct 2019 22:41)  BMI (kg/m2): 25.3 (08 Oct 2019 22:41)  BSA (m2): 1.68 (08 Oct 2019 22:41)    PAST MEDICAL & SURGICAL HISTORY:  Thyroid carcinoma  Asthma  Thrombosis, superior sagittal sinus  Brain tumor: meningioma  H/O thyroidectomy    FAMILY HISTORY: no cardiac disease    SOCIAL HISTORY: quit smoking 1 week ago    Vital Signs Last 24 Hrs  T(C): 36.8 (18 Oct 2019 05:34), Max: 36.8 (17 Oct 2019 14:22)  T(F): 98.3 (18 Oct 2019 05:34), Max: 98.3 (17 Oct 2019 14:22)  HR: 59 (18 Oct 2019 05:34) (52 - 60)  BP: 107/52 (18 Oct 2019 05:34) (107/52 - 123/65)  BP(mean): --  RR: 16 (18 Oct 2019 05:34) (14 - 18)  SpO2: 98% (18 Oct 2019 05:34) (96% - 98%)  PHYSICAL EXAM:    Constitutional: NAD  Eyes: PERRLA  ENMT: MMM  Neck: supple  Back: midline  Respiratory: CTA b/l  Cardiovascular: rrr, s1s2, no m/r/g  Gastrointestinal: soft, NTND, + BS  Extremities: warm  Vascular: + 2 pulses radial, no edema  Neurological: AAO x 4, 5/5 strength in all extremities  Skin: no rash  Lymph Nodes: no LAD  Musculoskeletal: no joint swelling  Psychiatric: normal affect    LABS:                        14.1   17.08 )-----------( 759      ( 18 Oct 2019 07:25 )             45.4   10-18    137  |  108  |  17  ----------------------------<  95  4.1   |  17<L>  |  0.86    Ca    8.9      18 Oct 2019 07:25  Phos  4.6     10-17  Mg     2.2     10-18        EKG:  Sinus bradycardia    ECHO, US:  US duplex:  No vein thrombosis seen.    RADIOLOGY:  CT head w/o contrast:  No intracranial hemorrhage. Stable exam.    MRI brain:  The MRI examination of the brain demonstrates the ventricles, cisternal spaces, and cortical sulci to be appropriate for the patient's stated age. There is no midline shift or extra-axial collection. There remains a few punctate foci of increased signal within the periventricular and subcortical white matter which is nonspecific and may represent the sequela of small vessel ischemic disease in this patient. The diffusion-weighted images demonstrate no acute ischemia. The GRE images demonstrate no hemorrhagic products. There is hyperintense FLAIR   signal within the superior sagittal sinus which correlates with patient's known sinus thrombosis. There is a isointense soft tissue mass involving the left cavernous sinuses with extension along the left middle cranial fossa with narrowing of the left internal carotid artery flow void. The left carotid flow void is attenuated with hyperintense signal with the petrous and cavernous   segments. These findings are compatible with the patient's underlying cavernous sinuses meningioma.    CTA head/neck:  Stable attenuated caliber to the distal left cervical internal carotid artery with focal area of high-grade stenosis/occlusion involving the left cavernous segment secondary to underlying cavernous sinus meningioma. Relatively stable thrombosis involving nearly the 2/3 of the superior sagittal sinus. Small filling defect within the underdeveloped distal right transverse sinus which may represent additional thrombus versus arachnoid granulation.

## 2019-10-18 NOTE — DISCHARGE NOTE PROVIDER - NSDCCPCAREPLAN_GEN_ALL_CORE_FT
PRINCIPAL DISCHARGE DIAGNOSIS  Diagnosis: Thrombosis of intracranial venous sinus  Assessment and Plan of Treatment: You have a known clot in one of the vessels in your brain. This is being treated with anticoagualation. It is important that you immediately return to the hospital if you have symptoms such as worsending headache, head pressure or blurry vision. Please follow up with neurology for monitoring.

## 2019-10-18 NOTE — DISCHARGE NOTE PROVIDER - HOSPITAL COURSE
Patient is a 63F with a PMH of meningioma and recently diagnosed sagittal sinus thrombosis who presented with a headache and was admitted for management of worsening sagittal sinus thrombus seen on imaging. Patient was admiitted to stroke service and was started on heparin gtt with bridge to coumadin. Admission also with episodes of asymptomatic bradycardia. Found to be JAK2 positive. Once therapeutic level of Coumadin achieved, patient deemed stable for discharge with close neurology and hematology follow-up as an outpatient. Patient is a 63F with a PMH of meningioma and recently diagnosed sagittal sinus thrombosis who presented with a headache and was admitted for management of worsening sagittal sinus thrombus seen on imaging. Patient was admitted to stroke service and was started on heparin gtt with bridge to coumadin. Admission also with episodes of asymptomatic bradycardia. Found to be JAK2 positive. Once therapeutic level of Coumadin achieved, patient deemed stable for discharge with close neurology and hematology follow-up as an outpatient.        Problem List/Main Diagnoses (system-based):         Thrombosis, superior sagittal sinus.     - Patient with progressively worsening headache x3 months with recent diagnosis as outpatient of sinus cavernous thrombosis     was started on lovenox as outpatient with imaging showing progression of venous sinus thrombosis now     - pt therapeutic on coumadin and will discharge on Coumadin X mg QD.    - Pt to f/u with Dr. Martin, on 10/22 who will introduce pt to Coumadin clinic.        Inpatient treatment course: heparin gtt with bridge to coumadin.     New medications: coumadin X    Labs to be followed outpatient: INR     Exam to be followed outpatient: Neurological exam Patient is a 63F with a PMH of meningioma and recently diagnosed sagittal sinus thrombosis who presented with a headache and was admitted for management of worsening sagittal sinus thrombus seen on imaging. Patient was admitted to stroke service and was started on heparin gtt with bridge to coumadin. Admission also with episodes of asymptomatic bradycardia. Found to be JAK2 positive. Therapeutic level of Coumadin achieved, patient is deemed stable for discharge with close neurology and hematology follow-up as an outpatient. Pt to be d/c on coumadin 7.5 mg QD and asa 81 mg QD.  Pt to f/u with Dr. Martin, on 10/22 who will introduce pt to Coumadin clinic.        Inpatient treatment course: heparin gtt with bridge to coumadin, asa 81 mg    New medications: coumadin 7.5 mg QD, asa 81 mg QD    Labs to be followed outpatient: INR     Exam to be followed outpatient: Neurological exam

## 2019-10-18 NOTE — DISCHARGE NOTE PROVIDER - CARE PROVIDER_API CALL
Shira Felder)  Neurology; Vascular Neurology  [address], [address] [place] [address]  Phone: [phone]  Fax: [phone]  Follow Up Time:     Neva Martin)  Internal Medicine  [address] [place] [address]  Phone: [phone]  Fax: [phone]  Follow Up Time:

## 2019-10-18 NOTE — DISCHARGE NOTE PROVIDER - NSDCFUADDAPPT_GEN_ALL_CORE_FT
Please follow up with Dr. Martin as scheduled on 10/22. Please follow up with Dr. Martin as scheduled on 10/22.  Please follow up with Dr. Felder.

## 2019-10-18 NOTE — DISCHARGE NOTE PROVIDER - CARE PROVIDERS DIRECT ADDRESSES
[email or web address]

## 2019-10-18 NOTE — PROGRESS NOTE ADULT - SUBJECTIVE AND OBJECTIVE BOX
O/N Events: PTT overnight therapeutic    Subjective: no complaints this morning, no head pressure today, ambulating well    ROS: denies HA, dizziness, blurry vision, CP, SOB    VITALS  Vital Signs Last 24 Hrs  T(C): 37.3 (18 Oct 2019 11:53), Max: 37.3 (18 Oct 2019 11:53)  T(F): 99.1 (18 Oct 2019 11:53), Max: 99.1 (18 Oct 2019 11:53)  HR: 68 (18 Oct 2019 11:53) (52 - 68)  BP: 103/63 (18 Oct 2019 11:53) (103/63 - 123/65)  BP(mean): --  RR: 16 (18 Oct 2019 11:53) (14 - 18)  SpO2: 95% (18 Oct 2019 11:53) (95% - 98%)    CAPILLARY BLOOD GLUCOSE          PHYSICAL EXAM  General appearance: found walking around room, pleasant, alert, NAD  Head: PERRLA, EOMI  Respiratory: breath sounds clear to auscultation throughout all lung fields, no accessory muscle use, no wheezing/rales/rhonchi   Cardiovascular: regular rate and rhythm, normal S1/S2, no murmurs, rubs or gallops appreciated  Gastrointestinal: soft, non-tender, non-distended, normoactive bowel sounds  Extremities: no LE edema  Neurological: AOx3, CNII-XII grossly intact; no obvious focal deficits; no dysmetria with FTNT    MEDICATIONS  (STANDING):  acetazolamide    Tablet 250 milliGRAM(s) Oral every 12 hours  aspirin enteric coated 81 milliGRAM(s) Oral daily  docusate sodium 100 milliGRAM(s) Oral three times a day  heparin  Infusion. 900 Unit(s)/Hr (9 mL/Hr) IV Continuous <Continuous>  influenza   Vaccine 0.5 milliLiter(s) IntraMuscular once  levothyroxine 200 MICROGram(s) Oral daily  pantoprazole    Tablet 40 milliGRAM(s) Oral before breakfast  polyethylene glycol 3350 17 Gram(s) Oral daily  warfarin 10 milliGRAM(s) Oral once    MEDICATIONS  (PRN):  acetaminophen   Tablet .. 325 milliGRAM(s) Oral every 4 hours PRN Mild Pain (1-3)  acetaminophen 300 mG/butalbital 50 mG/ caffeine 40 mG 1 Capsule(s) Oral every 4 hours PRN headache  bisacodyl Suppository 10 milliGRAM(s) Rectal daily PRN Constipation  ondansetron Injectable 4 milliGRAM(s) IV Push every 6 hours PRN Nausea and/or Vomiting      penicillin (Hives)      LABS                        14.1   17.08 )-----------( 759      ( 18 Oct 2019 07:25 )             45.4     10-18    137  |  108  |  17  ----------------------------<  95  4.1   |  17<L>  |  0.86    Ca    8.9      18 Oct 2019 07:25  Phos  4.6     10-17  Mg     2.2     10-18      PT/INR - ( 18 Oct 2019 07:25 )   PT: 20.1 sec;   INR: 1.75          PTT - ( 18 Oct 2019 12:45 )  PTT:91.3 sec          PROCEDURES/IMAGING  no new imaging

## 2019-10-18 NOTE — PROGRESS NOTE ADULT - PROBLEM SELECTOR PLAN 1
Patient with progressively worsening headache x3 months with recent diagnosis as outpatient of sinus cavernous thrombosis, started on lovenox as outpatient with imaging showing progression of venous sinus thrombosis now on hep gtt.  -PTT x4 therapeutic, will check daily  -10/18 coumadin dose 10 mg  -heparin gtt @ 9cc/hr  -Daily PT/PTT/INR in AM - goal INR 2-3 (10/18 INR 1.75)  -D/c heparin when INR 2-3 for 48 hrs on coumadin then continue with stable dose.  -cont. asa 81  -JAK2 positive  -Tylenol 325 PRN for pain  -Fioricet q4h for headache  -cont. acetazolamide 250 mg q 12 h

## 2019-10-19 LAB
ANION GAP SERPL CALC-SCNC: 11 MMOL/L — SIGNIFICANT CHANGE UP (ref 5–17)
APTT BLD: 89.6 SEC — HIGH (ref 27.5–36.3)
BUN SERPL-MCNC: 18 MG/DL — SIGNIFICANT CHANGE UP (ref 7–23)
CALCIUM SERPL-MCNC: 8.6 MG/DL — SIGNIFICANT CHANGE UP (ref 8.4–10.5)
CHLORIDE SERPL-SCNC: 109 MMOL/L — HIGH (ref 96–108)
CO2 SERPL-SCNC: 17 MMOL/L — LOW (ref 22–31)
CREAT SERPL-MCNC: 0.84 MG/DL — SIGNIFICANT CHANGE UP (ref 0.5–1.3)
GLUCOSE SERPL-MCNC: 94 MG/DL — SIGNIFICANT CHANGE UP (ref 70–99)
HCT VFR BLD CALC: 42.1 % — SIGNIFICANT CHANGE UP (ref 34.5–45)
HGB BLD-MCNC: 13.4 G/DL — SIGNIFICANT CHANGE UP (ref 11.5–15.5)
INR BLD: 2.01 — HIGH (ref 0.88–1.16)
MAGNESIUM SERPL-MCNC: 2.1 MG/DL — SIGNIFICANT CHANGE UP (ref 1.6–2.6)
MCHC RBC-ENTMCNC: 29.3 PG — SIGNIFICANT CHANGE UP (ref 27–34)
MCHC RBC-ENTMCNC: 31.8 GM/DL — LOW (ref 32–36)
MCV RBC AUTO: 91.9 FL — SIGNIFICANT CHANGE UP (ref 80–100)
NRBC # BLD: 0 /100 WBCS — SIGNIFICANT CHANGE UP (ref 0–0)
PLATELET # BLD AUTO: 701 K/UL — HIGH (ref 150–400)
POTASSIUM SERPL-MCNC: 4.1 MMOL/L — SIGNIFICANT CHANGE UP (ref 3.5–5.3)
POTASSIUM SERPL-SCNC: 4.1 MMOL/L — SIGNIFICANT CHANGE UP (ref 3.5–5.3)
PROTHROM AB SERPL-ACNC: 23.2 SEC — HIGH (ref 10–12.9)
RBC # BLD: 4.58 M/UL — SIGNIFICANT CHANGE UP (ref 3.8–5.2)
RBC # FLD: 14.2 % — SIGNIFICANT CHANGE UP (ref 10.3–14.5)
SODIUM SERPL-SCNC: 137 MMOL/L — SIGNIFICANT CHANGE UP (ref 135–145)
WBC # BLD: 17.17 K/UL — HIGH (ref 3.8–10.5)
WBC # FLD AUTO: 17.17 K/UL — HIGH (ref 3.8–10.5)

## 2019-10-19 PROCEDURE — 99233 SBSQ HOSP IP/OBS HIGH 50: CPT | Mod: 24

## 2019-10-19 RX ORDER — WARFARIN SODIUM 2.5 MG/1
10 TABLET ORAL ONCE
Refills: 0 | Status: COMPLETED | OUTPATIENT
Start: 2019-10-19 | End: 2019-10-19

## 2019-10-19 RX ADMIN — PANTOPRAZOLE SODIUM 40 MILLIGRAM(S): 20 TABLET, DELAYED RELEASE ORAL at 06:41

## 2019-10-19 RX ADMIN — Medication 100 MILLIGRAM(S): at 14:34

## 2019-10-19 RX ADMIN — ACETAZOLAMIDE 250 MILLIGRAM(S): 250 TABLET ORAL at 18:29

## 2019-10-19 RX ADMIN — Medication 200 MICROGRAM(S): at 06:41

## 2019-10-19 RX ADMIN — Medication 100 MILLIGRAM(S): at 06:41

## 2019-10-19 RX ADMIN — HEPARIN SODIUM 900 UNIT(S)/HR: 5000 INJECTION INTRAVENOUS; SUBCUTANEOUS at 02:35

## 2019-10-19 RX ADMIN — WARFARIN SODIUM 10 MILLIGRAM(S): 2.5 TABLET ORAL at 21:59

## 2019-10-19 RX ADMIN — POLYETHYLENE GLYCOL 3350 17 GRAM(S): 17 POWDER, FOR SOLUTION ORAL at 14:34

## 2019-10-19 RX ADMIN — Medication 81 MILLIGRAM(S): at 14:34

## 2019-10-19 RX ADMIN — ACETAZOLAMIDE 250 MILLIGRAM(S): 250 TABLET ORAL at 06:41

## 2019-10-19 RX ADMIN — Medication 100 MILLIGRAM(S): at 21:58

## 2019-10-19 NOTE — PROGRESS NOTE ADULT - PROBLEM SELECTOR PLAN 6
Stable disease, will order duonebs if needed
- no active plan at this time  - monitor respiratory symptoms
- no active plan at this time  - monitor respiratory symptoms
Stable disease
Patient with hyperchloremic metabolic acidosis on labs this am with chloride 112 and bicarb 19 consistent with NAGMA. likely 2/2 to IVF and Diamox.  -will switch NS to LR  -discontinue diamox when appropriate per neuro
Stable disease
Stable disease
- no active plan at this time  - monitor respiratory symptoms
Stable disease
Stable disease
Patient with hyperchloremic metabolic acidosis on labs this am with chloride 111 and bicarb 17 consistent with NAGMA. likely 2/2 to IVF and Diamox.  -will switch NS to LR  -discontinue diamox when appropriate per neuro

## 2019-10-19 NOTE — PROGRESS NOTE ADULT - PROBLEM SELECTOR PLAN 3
Patient afebrile throughout hospital course with persistent white count. No S&S of infection.  -trend CBC, white count slowly downtrending  -Heme also following - appointment on 10/22 as OP
Hx of thyroid cancer now euthyroid  -c/w synthroid 200mcg.
Patient with persistent thrombocytosis seen on labs with PLT > 500. Possibly related to etiology of thrombosis. Now PLT downtrending. With PLT downtrending likely reactive thrombosis.   -Dr. pradhan from [facility] following  -f/u JAK2  -trend CBC for plt
Patient afebrile throughout hospital course with persistent white count. No S&S of infection.  -trend CBC. 17.54 today.   -Heme also following.
Hx of thyroid cancer now euthyroid  -c/w synthroid 200mcg.
Hx of thyroid cancer now euthyroid  -c/w synthroid 200mcg.
Patient with persistent thrombocytosis seen on labs with PLT > 500. Possibly related to etiology of thrombosis. Now PLT downtrending  -Dr. pradhan from heme following  -f/u JAK2  -continue IVF at 150cc/hr to prevent clotting 2/2 to thrombocytosis. will switch NS to LR  -trend CBC for plt   -monitor lung checks in the setting of IVF
Patient afebrile throughout hospital course with persistent white count. No S&S of infection.  -trend CBC, white count slowly downtrending  -Heme also following - appointment on 10/22 as OP
Hx of thyroid cancer now euthyroid  -c/w synthroid 200mcg.

## 2019-10-19 NOTE — PROGRESS NOTE ADULT - PROBLEM SELECTOR PROBLEM 6
Mild intermittent asthma without complication
Hyperchloremic metabolic acidosis
Mild intermittent asthma without complication
Hyperchloremic metabolic acidosis

## 2019-10-19 NOTE — PROGRESS NOTE ADULT - SUBJECTIVE AND OBJECTIVE BOX
OVERNIGHT EVENTS:    SUBJECTIVE / INTERVAL HPI: Patient seen and examined at bedside.     VITAL SIGNS:  Vital Signs Last 24 Hrs  T(C): 36.8 (19 Oct 2019 05:39), Max: 37.3 (18 Oct 2019 11:53)  T(F): 98.2 (19 Oct 2019 05:39), Max: 99.1 (18 Oct 2019 11:53)  HR: 58 (19 Oct 2019 05:39) (58 - 75)  BP: 114/73 (19 Oct 2019 05:39) (103/63 - 114/73)  BP(mean): --  RR: 18 (19 Oct 2019 05:39) (14 - 18)  SpO2: 97% (19 Oct 2019 05:39) (95% - 97%)    PHYSICAL EXAM:    General: WDWN  HEENT: NCAT; PERRL, anicteric sclera; MMM  Neck: supple, trachea midline  Cardiovascular: S1, S2 normal; RRR, no M/G/R  Respiratory: CTABL; no W/R/R  Gastrointestinal: soft, nontender, nondistended. bowel sounds present.  Skin: no ulcerations or visible rashes appreciated  Extremities: WWP; no edema, clubbing or cyanosis  Vascular: 2+ radial, DP/PT pulses B/L  Neurological: AAOx3; CN II-XII grossly intact; no focal deficits    MEDICATIONS:  MEDICATIONS  (STANDING):  acetazolamide    Tablet 250 milliGRAM(s) Oral every 12 hours  aspirin enteric coated 81 milliGRAM(s) Oral daily  docusate sodium 100 milliGRAM(s) Oral three times a day  heparin  Infusion. 900 Unit(s)/Hr (9 mL/Hr) IV Continuous <Continuous>  influenza   Vaccine 0.5 milliLiter(s) IntraMuscular once  levothyroxine 200 MICROGram(s) Oral daily  pantoprazole    Tablet 40 milliGRAM(s) Oral before breakfast  polyethylene glycol 3350 17 Gram(s) Oral daily    MEDICATIONS  (PRN):  acetaminophen   Tablet .. 325 milliGRAM(s) Oral every 4 hours PRN Mild Pain (1-3)  acetaminophen 300 mG/butalbital 50 mG/ caffeine 40 mG 1 Capsule(s) Oral every 4 hours PRN headache  bisacodyl Suppository 10 milliGRAM(s) Rectal daily PRN Constipation  ondansetron Injectable 4 milliGRAM(s) IV Push every 6 hours PRN Nausea and/or Vomiting      ALLERGIES:  Allergies    penicillin (Hives)    Intolerances        LABS:                        13.4   17.17 )-----------( 701      ( 19 Oct 2019 06:45 )             42.1     10-19    137  |  109<H>  |  18  ----------------------------<  94  4.1   |  17<L>  |  0.84    Ca    8.6      19 Oct 2019 06:45  Mg     2.1     10-19      PT/INR - ( 19 Oct 2019 06:45 )   PT: 23.2 sec;   INR: 2.01          PTT - ( 19 Oct 2019 06:45 )  PTT:89.6 sec    CAPILLARY BLOOD GLUCOSE          RADIOLOGY & ADDITIONAL TESTS: Reviewed. OVERNIGHT EVENTS: RAHUL    SUBJECTIVE / INTERVAL HPI: Patient seen and examined at bedside. INR now 2.0    VITAL SIGNS:  Vital Signs Last 24 Hrs  T(C): 36.8 (19 Oct 2019 05:39), Max: 37.3 (18 Oct 2019 11:53)  T(F): 98.2 (19 Oct 2019 05:39), Max: 99.1 (18 Oct 2019 11:53)  HR: 58 (19 Oct 2019 05:39) (58 - 75)  BP: 114/73 (19 Oct 2019 05:39) (103/63 - 114/73)  BP(mean): --  RR: 18 (19 Oct 2019 05:39) (14 - 18)  SpO2: 97% (19 Oct 2019 05:39) (95% - 97%)    PHYSICAL EXAM:    General: WDWN  HEENT: NCAT; PERRL, anicteric sclera; MMM  Neck: supple, trachea midline  Cardiovascular: S1, S2 normal; RRR, no M/G/R  Respiratory: CTABL; no W/R/R  Gastrointestinal: soft, nontender, nondistended. bowel sounds present.  Skin: no ulcerations or visible rashes appreciated  Extremities: WWP; no edema, clubbing or cyanosis  Vascular: 2+ radial, DP/PT pulses B/L  Neurological: AAOx3; CN II-XII grossly intact; no focal deficits    MEDICATIONS:  MEDICATIONS  (STANDING):  acetazolamide    Tablet 250 milliGRAM(s) Oral every 12 hours  aspirin enteric coated 81 milliGRAM(s) Oral daily  docusate sodium 100 milliGRAM(s) Oral three times a day  heparin  Infusion. 900 Unit(s)/Hr (9 mL/Hr) IV Continuous <Continuous>  influenza   Vaccine 0.5 milliLiter(s) IntraMuscular once  levothyroxine 200 MICROGram(s) Oral daily  pantoprazole    Tablet 40 milliGRAM(s) Oral before breakfast  polyethylene glycol 3350 17 Gram(s) Oral daily    MEDICATIONS  (PRN):  acetaminophen   Tablet .. 325 milliGRAM(s) Oral every 4 hours PRN Mild Pain (1-3)  acetaminophen 300 mG/butalbital 50 mG/ caffeine 40 mG 1 Capsule(s) Oral every 4 hours PRN headache  bisacodyl Suppository 10 milliGRAM(s) Rectal daily PRN Constipation  ondansetron Injectable 4 milliGRAM(s) IV Push every 6 hours PRN Nausea and/or Vomiting      ALLERGIES:  Allergies    penicillin (Hives)    Intolerances        LABS:                        13.4   17.17 )-----------( 701      ( 19 Oct 2019 06:45 )             42.1     10-19    137  |  109<H>  |  18  ----------------------------<  94  4.1   |  17<L>  |  0.84    Ca    8.6      19 Oct 2019 06:45  Mg     2.1     10-19      PT/INR - ( 19 Oct 2019 06:45 )   PT: 23.2 sec;   INR: 2.01          PTT - ( 19 Oct 2019 06:45 )  PTT:89.6 sec    CAPILLARY BLOOD GLUCOSE          RADIOLOGY & ADDITIONAL TESTS: Reviewed.

## 2019-10-19 NOTE — PROGRESS NOTE ADULT - REASON FOR ADMISSION
headache

## 2019-10-19 NOTE — PROGRESS NOTE ADULT - PROBLEM SELECTOR PLAN 5
-Sinus manjit, with episode of manjit to 30s overnight, will obtain collateral. Asymptomatic without pauses. Possible cushing's response. Current HR >40  -C/w acetazolamide 250 q 12 to decrease ICP
Hx of thyroid cancer now euthyroid  -cont. synthroid 200 mcg.
Hx of thyroid cancer now euthyroid  -cont. synthroid 200 mcg.
Patient with sinus bradycardia HR 42-52. Patient not hypertensive so low concern that bradycardia 2/2 to increased intracranial pressure.  -will continue to monitor HR  -obtain collateral for baseline HR
Sinus bradycardia on EKG  Pt asymptomatic but was noted to go down to high 30's during sleep
Sinus bradycardia on EKG  Pt asymptomatic but was noted to go down to high 30's during sleep
Hx of thyroid cancer now euthyroid  -cont. synthroid 200 mcg.
-Sinus manjit, with episode of manjit to 30s overnight, will obtain collateral
-Sinus manjit, with episode of manjit to 30s overnight, will obtain collateral
Patient with sinus bradycardia HR 42-52. Patient not hypertensive so low concern that bradycardia 2/2 to increased intracranial pressure.  -will continue to monitor HR  -obtain collateral for baseline HR

## 2019-10-19 NOTE — PROGRESS NOTE ADULT - PROBLEM SELECTOR PROBLEM 5
Bradycardia
Thyroid carcinoma
Thyroid carcinoma
Bradycardia
Thyroid carcinoma
Bradycardia

## 2019-10-19 NOTE — PROGRESS NOTE ADULT - PROBLEM SELECTOR PLAN 1
Patient with progressively worsening headache x3 months with recent diagnosis as outpatient of sinus cavernous thrombosis, started on lovenox as outpatient with imaging showing progression of venous sinus thrombosis now on hep gtt.  -PTT x4 therapeutic, will check daily  -10/19 coumadin dose 10 mg  -heparin gtt @ 9cc/hr  -Daily PT/PTT/INR in AM - goal INR 2-3 (10/18 INR 1.75)  -D/c heparin when INR 2-3 for 48 hrs on coumadin then continue with stable dose.  -cont. asa 81  -JAK2 positive  -Tylenol 325 PRN for pain  -Fioricet q4h for headache  -cont. acetazolamide 250 mg q 12 h

## 2019-10-19 NOTE — PROGRESS NOTE ADULT - PROBLEM SELECTOR PLAN 2
Bradycardic at baseline. Previous episode of HR to 30s. Asymptomatic. Most recent HR consistently 40s-60s.  -continue to monitor
Continue antocoagulation with Coumadin aim at INR of 2-3.    DC when target INR reached...
Pt was started on Coumadin...DC when INR 2-3
2/2 to venous sinus thrombosis  -mgmt as above.
2/2 to venous sinus thrombosis. Headache today this am consistent with tension headache.   -see plan above
? DC in am with combined therapy Coumadin/Aspirin/+- Lovenox    f/u in my office 10/22 pt has appointment
Primary?    would add Aspirin 81 mg daily
Bradycardic at baseline. Previous episode of HR to 30s. Asymptomatic. Most recent HR consistently 40s-60s.  -continue to monitor
2/2 to venous sinus thrombosis  -mgmt as above.
2/2 to venous sinus thrombosis  -mgmt as above.
2/2 to venous sinus thrombosis  -see plan above
Bradycardic at baseline. Previous episode of HR to 30s. Asymptomatic. Most recent HR consistently 40s-60s.  -continue to monitor
2/2 to venous sinus thrombosis  -mgmt as above.

## 2019-10-19 NOTE — PROGRESS NOTE ADULT - SUBJECTIVE AND OBJECTIVE BOX
=================================  NEUROCRITICAL CARE ATTENDING NOTE  =================================    DANTE GRAHAM   MRN-[id number]  Summary:  63y/F  with a h/o asthma, thyroid cancer in the past, intracavernous tumor/meningioma, and recently dx large acute sinus sagittal thrombosis on Lovenox x 1 week (followed by Dr. Tejeda) who p/w worsening headache tonight. She has been experiencing HAs x 3 months, left supraorbital region, previous they were controlled y NSAIDs, but since she has been dx with thrombosis on lovenox she cannot take NSAIDs. She has been taking Tylenol and Fioricet but these are not really helping. Pt woke up two hours PTA with severe exacerbation of her headache on the left side. No vision or speech change, no n/v, no f/c, no neck pain, no trauma or fall. Patient was placed on Lovenox by Dr. Felder. she is being admitted to get a MRV of brain. Patient states that she quit smoking x 1 week due to severe headache. She is not using nicotine patch. (09 Oct 2019 04:30)    Past Medical History: Thyroid carcinoma Asthma Thrombosis, superior sagittal sinus Brain tumor  Allergies:  penicillin (Hives)  Home meds:  Lovenox 100 mg/mL injectable solution: injectable once a day Synthroid 200 mcg (0.2 mg) oral tablet: 1 tab(s) orally once a day    PHYSICAL EXAMINATION  T(C): 36.8 (10-19 @ 05:39), Max: 36.9 (10-18 @ 20:58) HR: 58 (10-19 @ 05:39) (58 - 75) BP: 114/73 (10-19 @ 05:39) (112/63 - 114/73) RR: 18 (10-19 @ 05:39) (14 - 18) SpO2: 97% (10-19 @ 05:39) (95% - 97%)   NEUROLOGIC EXAMINATION:  Patient is awake, alert, fully oriented, pupils 2-3mm equal and briskly reactive to light, EOMs intact, muscle strength 5/5 on all 4s.  GENERAL: not intubated, not in cardiorespiratory distress  EENT:  anicteric  CARDIOVASCULAR: (+) S1 S2, bradycardic rate and regular rhythm  PULMONARY: clear to auscultation bilaterally  ABDOMEN: soft, nontender with normoactive bowel sounds  EXTREMITIES: no edema  SKIN: no rash    LABS:             13.4   17.17 )-----------( 701      ( 19 Oct 2019 06:45 )             42.1     137  |  109<H>  |  18  ----------------------------<  94  4.1   |  17<L>  |  0.84    Ca    8.6      19 Oct 2019 06:45  Mg     2.1     10-19    10-18 @ 07:01  -  10-19 @ 07:00  IN: 456 mL / OUT: 800 mL / NET: -344 mL    INR TREND:  2.01 (10-19 @ 06:45)    1.75 (10-18 @ 07:25) 10 mg given  1.52 (10-17 @ 06:53) 10 mg given  1.27 (10-16 @ 06:22) 7.5 mg given  1.10 (10-15 @ 06:52) 10mg given   1.03 (10-14 @ 14:38) 10mg given     Bacteriology:  CSF studies:  EEG:  Neuroimaging:  10/12 CT head: stable exam  10/12 CTA:  stable attenuated caliber to distal L cervical ICA with focal area of high-grade stenosis / occlusion involving L cavernous segment secondary to underlying cavernous sinus meningioma, stable thrombosis involving 2/3 of SSS, small defect distal R transverse sinus - additional thrombus vs arachnoid granulation  Other imaging:    MEDICATIONS: 10-19  ASA 81mg PO daily acetazolamide 250mg PO q12h docusate 100mg PO TID pantoprazole 40 daily polyethylene glycol 17G daily daily levothyroxine 200 ug PO daily fioricet PRN bisacodyl PRN     IV FLUIDS:  DRIPS: heparin gtt.  DIET:  Lines:  Drains:    Wounds:    CODE STATUS:  Full Code                       GOALS OF CARE:  aggressive                      DISPOSITION:  7La

## 2019-10-19 NOTE — PROGRESS NOTE ADULT - PROBLEM SELECTOR PROBLEM 1
Essential thrombocytosis
JAK2 [id number] mutation
Thrombosis, superior sagittal sinus
JAK2 V617F mutation
Thrombosis, superior sagittal sinus

## 2019-10-19 NOTE — PROGRESS NOTE ADULT - ASSESSMENT
63y/F with  1.  venous sinus thrombosis involving superior sagittal sinus, possibly R transverse sinus  2.  cavernous sinus meningioma, brain compression  3.  high grade stenosis / occlusion distal L cervical ICA  4.  h/o thyroid carcinoma, hypothyroidism  5.  asthma, not in exacerbation  6.  consider essential thrombocytosis      PLAN:   - INR now therapeutic, overlap heparin gtt x 24 hours then d/c  - coumadin dose per hematology; discuss plans for hydroxyurea with heme  - continue aspirin

## 2019-10-19 NOTE — PROGRESS NOTE ADULT - PROBLEM SELECTOR PROBLEM 3
Other elevated white blood cell (WBC) count
Thrombocytosis
Thyroid carcinoma
Other elevated white blood cell (WBC) count
Thyroid carcinoma
Thyroid carcinoma
Thrombocytosis
Other elevated white blood cell (WBC) count
Thyroid carcinoma

## 2019-10-19 NOTE — PROGRESS NOTE ADULT - PROBLEM SELECTOR PROBLEM 2
Bradycardia
Thrombosis, superior sagittal sinus
Thrombosis, superior sagittal sinus
Headache
Other headache syndrome
Thrombocytosis
Thrombosis, superior sagittal sinus
Bradycardia
Other headache syndrome
Other headache syndrome
Headache
Bradycardia
Other headache syndrome

## 2019-10-19 NOTE — PROGRESS NOTE ADULT - PROBLEM SELECTOR PLAN 4
Patient with persistent thrombocytosis seen on labs with PLT > 500.  -Dr. Martin from heme following-now believes thrombocytosis reactionary  -JAK2 positive  -trend CBC for plt
Patient afebrile throughout hospital course with persistent white count, no S&S of infectious etiology, no cough, ho hematuria, no rashes. likely 2/2 to steroids. WBC increased on 10/14 still with no signs and sxs of infection  -will add on diff to am labs  -trend CBC
Patient with persistent thrombocytosis seen on labs with PLT > 500. Possibly related to etiology of thrombosis  -Dr. pradhan from heme following  -RADHA 2 +  -continue IVF at 150cc/hr to prevent clotting 2/2 to thrombocytosis LR  -trend CBC for plt.
Patient with persistent thrombocytosis seen on labs with PLT > 500. Possibly related to etiology of thrombosis  -Dr. pradhan from heme following  -RADHA 2 +  -continue IVF at 150cc/hr to prevent clotting 2/2 to thrombocytosis LR  -trend CBC for plt.
Patient with persistent thrombocytosis seen on labs with PLT > 500. Possibly related to etiology of thrombosis  -Dr. pradhan from heme following  -f/u JAK2  -continue IVF at 150cc/hr to prevent clotting 2/2 to thrombocytosis LR  -trend CBC for plt.
Patient with persistent thrombocytosis seen on labs with PLT > 500.  -Dr. Martin from heme following-now believes thrombocytosis reactionary  -JAK2 positive  -trend CBC for plt. 777 today - uptrending
Patient with persistent thrombocytosis seen on labs with PLT > 500. Possibly related to etiology of thrombosis  -Dr. pradhan from heme following-now believes thrombocytosis reactionary  -JAK2 positive  -trend CBC for plt.
Patient with persistent thrombocytosis seen on labs with PLT > 500. Possibly related to etiology of thrombosis  -Dr. pradhan from heme following-now believes thrombocytosis reactionary  -f/u JAK2 will dc ASA pending results  -trend CBC for plt.
Patient afebrile throughout hospital course with persistent white count, no S&S of infectious etiology, no cough, ho hematuria, no rashes. likely 2/2 to steroids. now wbc downtrending.  -trend CBC
Patient with persistent thrombocytosis seen on labs with PLT > 500.  -Dr. Martin from heme following-now believes thrombocytosis reactionary  -JAK2 positive  -trend CBC for plt
Patient with persistent thrombocytosis seen on labs with PLT > 500. Possibly related to etiology of thrombosis  -Dr. pradhan from heme following-now believes thrombocytosis reactionary  -JAK2 positive  -trend CBC for plt. 658 today.

## 2019-10-20 ENCOUNTER — TRANSCRIPTION ENCOUNTER (OUTPATIENT)
Age: 64
End: 2019-10-20

## 2019-10-20 VITALS
HEART RATE: 51 BPM | RESPIRATION RATE: 16 BRPM | DIASTOLIC BLOOD PRESSURE: 71 MMHG | SYSTOLIC BLOOD PRESSURE: 110 MMHG | TEMPERATURE: 98 F | OXYGEN SATURATION: 97 %

## 2019-10-20 LAB
ANION GAP SERPL CALC-SCNC: 11 MMOL/L — SIGNIFICANT CHANGE UP (ref 5–17)
APTT BLD: 160.3 SEC — CRITICAL HIGH (ref 27.5–36.3)
BUN SERPL-MCNC: 17 MG/DL — SIGNIFICANT CHANGE UP (ref 7–23)
CALCIUM SERPL-MCNC: 8.8 MG/DL — SIGNIFICANT CHANGE UP (ref 8.4–10.5)
CHLORIDE SERPL-SCNC: 108 MMOL/L — SIGNIFICANT CHANGE UP (ref 96–108)
CO2 SERPL-SCNC: 18 MMOL/L — LOW (ref 22–31)
CREAT SERPL-MCNC: 0.93 MG/DL — SIGNIFICANT CHANGE UP (ref 0.5–1.3)
GLUCOSE SERPL-MCNC: 97 MG/DL — SIGNIFICANT CHANGE UP (ref 70–99)
HCT VFR BLD CALC: 43 % — SIGNIFICANT CHANGE UP (ref 34.5–45)
HGB BLD-MCNC: 13.2 G/DL — SIGNIFICANT CHANGE UP (ref 11.5–15.5)
INR BLD: 2.59 — HIGH (ref 0.88–1.16)
MAGNESIUM SERPL-MCNC: 2.2 MG/DL — SIGNIFICANT CHANGE UP (ref 1.6–2.6)
MCHC RBC-ENTMCNC: 28.8 PG — SIGNIFICANT CHANGE UP (ref 27–34)
MCHC RBC-ENTMCNC: 30.7 GM/DL — LOW (ref 32–36)
MCV RBC AUTO: 93.7 FL — SIGNIFICANT CHANGE UP (ref 80–100)
NRBC # BLD: 0 /100 WBCS — SIGNIFICANT CHANGE UP (ref 0–0)
PLATELET # BLD AUTO: 713 K/UL — HIGH (ref 150–400)
POTASSIUM SERPL-MCNC: 4.1 MMOL/L — SIGNIFICANT CHANGE UP (ref 3.5–5.3)
POTASSIUM SERPL-SCNC: 4.1 MMOL/L — SIGNIFICANT CHANGE UP (ref 3.5–5.3)
PROTHROM AB SERPL-ACNC: 30.2 SEC — HIGH (ref 10–12.9)
RBC # BLD: 4.59 M/UL — SIGNIFICANT CHANGE UP (ref 3.8–5.2)
RBC # FLD: 14.2 % — SIGNIFICANT CHANGE UP (ref 10.3–14.5)
SODIUM SERPL-SCNC: 137 MMOL/L — SIGNIFICANT CHANGE UP (ref 135–145)
WBC # BLD: 14.67 K/UL — HIGH (ref 3.8–10.5)
WBC # FLD AUTO: 14.67 K/UL — HIGH (ref 3.8–10.5)

## 2019-10-20 PROCEDURE — 96375 TX/PRO/DX INJ NEW DRUG ADDON: CPT | Mod: XU

## 2019-10-20 PROCEDURE — 70551 MRI BRAIN STEM W/O DYE: CPT

## 2019-10-20 PROCEDURE — 36415 COLL VENOUS BLD VENIPUNCTURE: CPT

## 2019-10-20 PROCEDURE — 84100 ASSAY OF PHOSPHORUS: CPT

## 2019-10-20 PROCEDURE — 86900 BLOOD TYPING SEROLOGIC ABO: CPT

## 2019-10-20 PROCEDURE — 85520 HEPARIN ASSAY: CPT

## 2019-10-20 PROCEDURE — 70496 CT ANGIOGRAPHY HEAD: CPT

## 2019-10-20 PROCEDURE — 96361 HYDRATE IV INFUSION ADD-ON: CPT | Mod: XU

## 2019-10-20 PROCEDURE — 86901 BLOOD TYPING SEROLOGIC RH(D): CPT

## 2019-10-20 PROCEDURE — 86803 HEPATITIS C AB TEST: CPT

## 2019-10-20 PROCEDURE — 82550 ASSAY OF CK (CPK): CPT

## 2019-10-20 PROCEDURE — 85027 COMPLETE CBC AUTOMATED: CPT

## 2019-10-20 PROCEDURE — 71045 X-RAY EXAM CHEST 1 VIEW: CPT

## 2019-10-20 PROCEDURE — 80053 COMPREHEN METABOLIC PANEL: CPT

## 2019-10-20 PROCEDURE — 70450 CT HEAD/BRAIN W/O DYE: CPT

## 2019-10-20 PROCEDURE — 96365 THER/PROPH/DIAG IV INF INIT: CPT | Mod: XU

## 2019-10-20 PROCEDURE — 80048 BASIC METABOLIC PNL TOTAL CA: CPT

## 2019-10-20 PROCEDURE — 70498 CT ANGIOGRAPHY NECK: CPT

## 2019-10-20 PROCEDURE — 99285 EMERGENCY DEPT VISIT HI MDM: CPT | Mod: 25

## 2019-10-20 PROCEDURE — 85025 COMPLETE CBC W/AUTO DIFF WBC: CPT

## 2019-10-20 PROCEDURE — 83735 ASSAY OF MAGNESIUM: CPT

## 2019-10-20 PROCEDURE — 70544 MR ANGIOGRAPHY HEAD W/O DYE: CPT

## 2019-10-20 PROCEDURE — 93970 EXTREMITY STUDY: CPT

## 2019-10-20 PROCEDURE — 82553 CREATINE MB FRACTION: CPT

## 2019-10-20 PROCEDURE — 85610 PROTHROMBIN TIME: CPT

## 2019-10-20 PROCEDURE — 81270 JAK2 GENE: CPT

## 2019-10-20 PROCEDURE — 99232 SBSQ HOSP IP/OBS MODERATE 35: CPT

## 2019-10-20 PROCEDURE — 85730 THROMBOPLASTIN TIME PARTIAL: CPT

## 2019-10-20 PROCEDURE — 84484 ASSAY OF TROPONIN QUANT: CPT

## 2019-10-20 PROCEDURE — 86850 RBC ANTIBODY SCREEN: CPT

## 2019-10-20 PROCEDURE — 81001 URINALYSIS AUTO W/SCOPE: CPT

## 2019-10-20 RX ORDER — HEPARIN SODIUM 5000 [USP'U]/ML
700 INJECTION INTRAVENOUS; SUBCUTANEOUS
Qty: 25000 | Refills: 0 | Status: DISCONTINUED | OUTPATIENT
Start: 2019-10-20 | End: 2019-10-20

## 2019-10-20 RX ORDER — ASPIRIN/CALCIUM CARB/MAGNESIUM 324 MG
1 TABLET ORAL
Qty: 30 | Refills: 0
Start: 2019-10-20 | End: 2019-11-18

## 2019-10-20 RX ORDER — WARFARIN SODIUM 2.5 MG/1
1 TABLET ORAL
Qty: 30 | Refills: 0
Start: 2019-10-20 | End: 2019-11-18

## 2019-10-20 RX ADMIN — ACETAZOLAMIDE 250 MILLIGRAM(S): 250 TABLET ORAL at 06:10

## 2019-10-20 RX ADMIN — PANTOPRAZOLE SODIUM 40 MILLIGRAM(S): 20 TABLET, DELAYED RELEASE ORAL at 06:10

## 2019-10-20 RX ADMIN — Medication 200 MICROGRAM(S): at 06:10

## 2019-10-20 RX ADMIN — Medication 100 MILLIGRAM(S): at 06:10

## 2019-10-20 RX ADMIN — HEPARIN SODIUM 0 UNIT(S)/HR: 5000 INJECTION INTRAVENOUS; SUBCUTANEOUS at 08:01

## 2019-10-20 NOTE — PROGRESS NOTE ADULT - SUBJECTIVE AND OBJECTIVE BOX
=================================  NEUROCRITICAL CARE ATTENDING NOTE  =================================    DANTE GRAHAM   MRN-[id number]  Summary:  63y/F  with a h/o asthma, thyroid cancer in the past, intracavernous tumor/meningioma, and recently dx large acute sinus sagittal thrombosis on Lovenox x 1 week (followed by Dr. Tejeda) who p/w worsening headache tonight. She has been experiencing HAs x 3 months, left supraorbital region, previous they were controlled y NSAIDs, but since she has been dx with thrombosis on lovenox she cannot take NSAIDs. She has been taking Tylenol and Fioricet but these are not really helping. Pt woke up two hours PTA with severe exacerbation of her headache on the left side. No vision or speech change, no n/v, no f/c, no neck pain, no trauma or fall. Patient was placed on Lovenox by Dr. Felder. she is being admitted to get a MRV of brain. Patient states that she quit smoking x 1 week due to severe headache. She is not using nicotine patch. (09 Oct 2019 04:30)    Past Medical History: Thyroid carcinoma Asthma Thrombosis, superior sagittal sinus Brain tumor  Allergies:  penicillin (Hives)  Home meds:  Lovenox 100 mg/mL injectable solution: injectable once a day Synthroid 200 mcg (0.2 mg) oral tablet: 1 tab(s) orally once a day    PHYSICAL EXAMINATION  T(C): 36.4 (10-20 @ 05:26), Max: 36.8 (10-19 @ 21:55) HR: 51 (10-20 @ 05:26) (51 - 59) BP: 110/71 (10-20 @ 05:26) (110/71 - 112/71) RR: 16 (10-20 @ 05:26) (16 - 16) SpO2: 97% (10-20 @ 05:26) (96% - 97%)  NEUROLOGIC EXAMINATION:  Patient is awake, alert, fully oriented, pupils 2-3mm equal and briskly reactive to light, EOMs intact, muscle strength 5/5 on all 4s.  GENERAL: not intubated, not in cardiorespiratory distress  EENT:  anicteric  CARDIOVASCULAR: (+) S1 S2, bradycardic rate and regular rhythm  PULMONARY: clear to auscultation bilaterally  ABDOMEN: soft, nontender with normoactive bowel sounds  EXTREMITIES: no edema  SKIN: no rash    LABS:             13.2   14.67 )-----------( 713      ( 20 Oct 2019 05:45 )             43.0     137  |  108  |  17  ----------------------------<  97  4.1   |  18<L>  |  0.93    Ca    8.8      20 Oct 2019 05:45  Mg     2.2     10-20    10-19 @ 07:01  -  10-20 @ 07:00  IN: 99 mL / OUT: 900 mL / NET: -801 mL    INR TREND:  2.59 (10-20 @ 05:45)  2.01 (10-19 @ 06:45) 10mg given    1.75 (10-18 @ 07:25) 10 mg given  1.52 (10-17 @ 06:53) 10 mg given  1.27 (10-16 @ 06:22) 7.5 mg given  1.10 (10-15 @ 06:52) 10mg given   1.03 (10-14 @ 14:38) 10mg given     Bacteriology:  CSF studies:  EEG:  Neuroimaging:  10/12 CT head: stable exam  10/12 CTA:  stable attenuated caliber to distal L cervical ICA with focal area of high-grade stenosis / occlusion involving L cavernous segment secondary to underlying cavernous sinus meningioma, stable thrombosis involving 2/3 of SSS, small defect distal R transverse sinus - additional thrombus vs arachnoid granulation  Other imaging:    MEDICATIONS: 10-20 reviewed    IV FLUIDS:  DRIPS: heparin gtt.  DIET:  Lines:  Drains:    Wounds:    CODE STATUS:  Full Code                       GOALS OF CARE:  aggressive                      DISPOSITION:  7La

## 2019-10-20 NOTE — PROVIDER CONTACT NOTE (CRITICAL VALUE NOTIFICATION) - SITUATION
Patient receiving Heparin, and being bridged with Coumadin.  Ptt usual 89-90.  This Ptt is 160.3, blood drawn on the opposite arm of the infusing heparin. Patient also reports a nosebleed.

## 2019-10-20 NOTE — DISCHARGE NOTE NURSING/CASE MANAGEMENT/SOCIAL WORK - PATIENT PORTAL LINK FT
You can access the FollowMyHealth Patient Portal offered by [facility] by registering at the following website: [email or web address]. By joining [facility]’s FollowMyHealth portal, you will also be able to view your health information using other applications (apps) compatible with our system.

## 2019-10-20 NOTE — PROGRESS NOTE ADULT - ASSESSMENT
63y/F with  1.  venous sinus thrombosis involving superior sagittal sinus, possibly R transverse sinus  2.  cavernous sinus meningioma, brain compression  3.  high grade stenosis / occlusion distal L cervical ICA  4.  h/o thyroid carcinoma, hypothyroidism  5.  asthma, not in exacerbation  6.  consider essential thrombocytosis      PLAN:   - d/c heparin drip  - coumadin dosing as per gerardo / Dr. Felder  - discussed with Dr. Martin - f/u with coumadin clinic per gerardo, continue ASA

## 2019-10-20 NOTE — PROGRESS NOTE ADULT - PROVIDER SPECIALTY LIST ADULT
Heme/Onc
Internal Medicine
NSICU
Neurology
Neurosurgery
Neurology
Heme/Onc
Neurology
Neurology

## 2019-10-21 ENCOUNTER — INBOUND DOCUMENT (OUTPATIENT)
Age: 64
End: 2019-10-21

## 2019-10-21 RX ORDER — ENOXAPARIN SODIUM 100 MG/ML
0 INJECTION SUBCUTANEOUS
Qty: 0 | Refills: 0 | DISCHARGE

## 2019-10-22 ENCOUNTER — APPOINTMENT (OUTPATIENT)
Dept: HEMATOLOGY ONCOLOGY | Facility: CLINIC | Age: 64
End: 2019-10-22
Payer: COMMERCIAL

## 2019-10-22 ENCOUNTER — APPOINTMENT (OUTPATIENT)
Dept: INTERNAL MEDICINE | Facility: CLINIC | Age: 64
End: 2019-10-22
Payer: COMMERCIAL

## 2019-10-22 PROBLEM — C73 MALIGNANT NEOPLASM OF THYROID GLAND: Chronic | Status: ACTIVE | Noted: 2019-10-09

## 2019-10-22 PROBLEM — D49.6 NEOPLASM OF UNSPECIFIED BEHAVIOR OF BRAIN: Chronic | Status: ACTIVE | Noted: 2019-10-08

## 2019-10-22 PROBLEM — G08 INTRACRANIAL AND INTRASPINAL PHLEBITIS AND THROMBOPHLEBITIS: Chronic | Status: ACTIVE | Noted: 2019-10-09

## 2019-10-22 LAB — INR PPP: 2.8 RATIO

## 2019-10-22 PROCEDURE — 99214 OFFICE O/P EST MOD 30 MIN: CPT

## 2019-10-22 PROCEDURE — 85610 PROTHROMBIN TIME: CPT | Mod: QW

## 2019-10-22 PROCEDURE — 93793 ANTICOAG MGMT PT WARFARIN: CPT

## 2019-10-22 RX ORDER — IBUPROFEN 800 MG
TABLET ORAL
Refills: 0 | Status: DISCONTINUED | COMMUNITY
End: 2019-10-22

## 2019-10-22 RX ORDER — ENOXAPARIN SODIUM 100 MG/ML
100 INJECTION SUBCUTANEOUS DAILY
Qty: 1 | Refills: 0 | Status: DISCONTINUED | COMMUNITY
Start: 2019-10-01 | End: 2019-10-22

## 2019-10-22 RX ORDER — BUTALBITAL, ACETAMINOPHEN AND CAFFEINE 300; 50; 40 MG/1; MG/1; MG/1
50-300-40 CAPSULE ORAL EVERY 6 HOURS
Qty: 40 | Refills: 0 | Status: DISCONTINUED | COMMUNITY
Start: 2019-10-07 | End: 2019-10-22

## 2019-10-22 RX ORDER — ONDANSETRON 4 MG/1
4 TABLET ORAL
Qty: 15 | Refills: 0 | Status: DISCONTINUED | COMMUNITY
Start: 2019-08-29 | End: 2019-10-22

## 2019-10-22 RX ORDER — BUTALBITAL, ACETAMINOPHEN AND CAFFEINE 325; 50; 40 MG/1; MG/1; MG/1
50-325-40 TABLET ORAL
Qty: 40 | Refills: 0 | Status: DISCONTINUED | COMMUNITY
Start: 2019-09-05 | End: 2019-10-22

## 2019-10-22 RX ORDER — GABAPENTIN 100 MG/1
100 CAPSULE ORAL
Qty: 90 | Refills: 0 | Status: DISCONTINUED | COMMUNITY
Start: 2019-09-11 | End: 2019-10-22

## 2019-10-22 RX ORDER — CLINDAMYCIN HYDROCHLORIDE 300 MG/1
300 CAPSULE ORAL
Qty: 30 | Refills: 0 | Status: DISCONTINUED | COMMUNITY
End: 2019-10-22

## 2019-10-22 NOTE — ASSESSMENT
[FreeTextEntry1] : pt's INR controlled...continue same Coumadin dose (pt will let us know how much she takes...)\par \par will find her a PCP in the neighborhood to control INR.\par \par f/u here 1 m.

## 2019-10-22 NOTE — HISTORY OF PRESENT ILLNESS
[de-identified] : 62 y/o recently diagnosed with Talat 2 positive ET , s/p thrombosis and extension of sup sagittal venous thrombosis...pt on Coumadin and Aspirin(81mg) now...\par \par aware of risks for bleeding...INR today 2.8 .

## 2019-10-23 ENCOUNTER — APPOINTMENT (OUTPATIENT)
Dept: INTERNAL MEDICINE | Facility: CLINIC | Age: 64
End: 2019-10-23

## 2019-10-24 ENCOUNTER — APPOINTMENT (OUTPATIENT)
Dept: INTERNAL MEDICINE | Facility: CLINIC | Age: 64
End: 2019-10-24

## 2019-10-24 DIAGNOSIS — R00.1 BRADYCARDIA, UNSPECIFIED: ICD-10-CM

## 2019-10-24 DIAGNOSIS — D72.829 ELEVATED WHITE BLOOD CELL COUNT, UNSPECIFIED: ICD-10-CM

## 2019-10-24 DIAGNOSIS — T38.0X5A ADVERSE EFFECT OF GLUCOCORTICOIDS AND SYNTHETIC ANALOGUES, INITIAL ENCOUNTER: ICD-10-CM

## 2019-10-24 DIAGNOSIS — I67.6 NONPYOGENIC THROMBOSIS OF INTRACRANIAL VENOUS SYSTEM: ICD-10-CM

## 2019-10-24 DIAGNOSIS — E87.2 ACIDOSIS: ICD-10-CM

## 2019-10-24 DIAGNOSIS — J45.20 MILD INTERMITTENT ASTHMA, UNCOMPLICATED: ICD-10-CM

## 2019-10-24 DIAGNOSIS — Z85.850 PERSONAL HISTORY OF MALIGNANT NEOPLASM OF THYROID: ICD-10-CM

## 2019-10-24 DIAGNOSIS — G93.5 COMPRESSION OF BRAIN: ICD-10-CM

## 2019-10-24 DIAGNOSIS — I65.22 OCCLUSION AND STENOSIS OF LEFT CAROTID ARTERY: ICD-10-CM

## 2019-10-24 DIAGNOSIS — E66.3 OVERWEIGHT: ICD-10-CM

## 2019-10-24 DIAGNOSIS — Z88.0 ALLERGY STATUS TO PENICILLIN: ICD-10-CM

## 2019-10-24 DIAGNOSIS — E89.0 POSTPROCEDURAL HYPOTHYROIDISM: ICD-10-CM

## 2019-10-24 DIAGNOSIS — Z87.891 PERSONAL HISTORY OF NICOTINE DEPENDENCE: ICD-10-CM

## 2019-10-24 DIAGNOSIS — D47.3 ESSENTIAL (HEMORRHAGIC) THROMBOCYTHEMIA: ICD-10-CM

## 2019-10-28 ENCOUNTER — APPOINTMENT (OUTPATIENT)
Dept: INTERNAL MEDICINE | Facility: CLINIC | Age: 64
End: 2019-10-28
Payer: COMMERCIAL

## 2019-10-28 VITALS
TEMPERATURE: 98.1 F | BODY MASS INDEX: 26.4 KG/M2 | SYSTOLIC BLOOD PRESSURE: 107 MMHG | WEIGHT: 149 LBS | DIASTOLIC BLOOD PRESSURE: 67 MMHG | RESPIRATION RATE: 14 BRPM | HEART RATE: 71 BPM | HEIGHT: 63 IN | OXYGEN SATURATION: 97 %

## 2019-10-28 PROCEDURE — 99214 OFFICE O/P EST MOD 30 MIN: CPT

## 2019-10-30 NOTE — ASSESSMENT
[FreeTextEntry1] : Talat 2 mutation,  cavernous venous sinus Thrombosis \par cont coumadin\par PT/INR ordered\par recently diagnosed  Meningioma\par f/u in 3 months with neurosurgeon as scheduled

## 2019-10-30 NOTE — HISTORY OF PRESENT ILLNESS
[de-identified] : 64 yo female, presents for post hospitalization follow up visit\par After returning from vacation she had constant headaches. Saw neurology had CT of head they saw a tumor. She saw neurosurgeon and was diagnosed with meningioma. Had MRI and they saw intracranial venous sinus thrombosis \par She was hospitalized at [facility] for 10 days \par She underwent hypercoaguble work up and was + for Talat 2 mutation\par She was started on Coumadin 7.5 mg daily

## 2019-10-30 NOTE — PHYSICAL EXAM
[Normal Sclera/Conjunctiva] : normal sclera/conjunctiva [Normal Outer Ear/Nose] : the outer ears and nose were normal in appearance [No JVD] : no jugular venous distention [Normal] : normal rate, regular rhythm, normal S1 and S2 and no murmur heard [No Edema] : there was no peripheral edema [Soft] : abdomen soft [Non Tender] : non-tender [Non-distended] : non-distended [Normal Anterior Cervical Nodes] : no anterior cervical lymphadenopathy [No CVA Tenderness] : no CVA  tenderness [No Joint Swelling] : no joint swelling [No Rash] : no rash [Alert and Oriented x3] : oriented to person, place, and time [Normal Insight/Judgement] : insight and judgment were intact

## 2019-10-31 ENCOUNTER — APPOINTMENT (OUTPATIENT)
Dept: INTERNAL MEDICINE | Facility: CLINIC | Age: 64
End: 2019-10-31

## 2019-11-02 LAB
INR PPP: 2.44 RATIO
PT BLD: 28.3 SEC

## 2019-11-04 ENCOUNTER — MED ADMIN CHARGE (OUTPATIENT)
Age: 64
End: 2019-11-04

## 2019-11-04 ENCOUNTER — RESULT CHARGE (OUTPATIENT)
Age: 64
End: 2019-11-04

## 2019-11-04 ENCOUNTER — CHART COPY (OUTPATIENT)
Age: 64
End: 2019-11-04

## 2019-11-04 LAB
INR PPP: 3.1 RATIO
POCT-PROTHROMBIN TIME: 36.8 SECS

## 2019-11-08 ENCOUNTER — APPOINTMENT (OUTPATIENT)
Dept: NEUROLOGY | Facility: CLINIC | Age: 64
End: 2019-11-08
Payer: MEDICARE

## 2019-11-08 VITALS
HEIGHT: 63 IN | WEIGHT: 145 LBS | HEART RATE: 65 BPM | OXYGEN SATURATION: 96 % | DIASTOLIC BLOOD PRESSURE: 75 MMHG | BODY MASS INDEX: 25.69 KG/M2 | SYSTOLIC BLOOD PRESSURE: 127 MMHG | TEMPERATURE: 98.1 F

## 2019-11-08 LAB
MPL COMMENT: SIGNIFICANT CHANGE UP
MPL S505 MUTATION: SIGNIFICANT CHANGE UP
MPL SOURCE: SIGNIFICANT CHANGE UP
MPL W515 MUTATION: SIGNIFICANT CHANGE UP

## 2019-11-08 PROCEDURE — 99214 OFFICE O/P EST MOD 30 MIN: CPT

## 2019-11-11 ENCOUNTER — RESULT CHARGE (OUTPATIENT)
Age: 64
End: 2019-11-11

## 2019-11-12 LAB
INR PPP: 2.2 RATIO
POCT-PROTHROMBIN TIME: 26.3 SECS

## 2019-11-13 NOTE — ASSESSMENT
[FreeTextEntry1] : RADHA 2 thrombocytosis\par Sag and transverse sinus thrombosis\par \par cont coumadin and asa

## 2019-11-18 ENCOUNTER — APPOINTMENT (OUTPATIENT)
Dept: HEMATOLOGY ONCOLOGY | Facility: CLINIC | Age: 64
End: 2019-11-18
Payer: MEDICARE

## 2019-11-18 VITALS
DIASTOLIC BLOOD PRESSURE: 71 MMHG | HEART RATE: 58 BPM | RESPIRATION RATE: 14 BRPM | SYSTOLIC BLOOD PRESSURE: 124 MMHG | OXYGEN SATURATION: 97 % | HEIGHT: 63 IN | TEMPERATURE: 97.8 F | BODY MASS INDEX: 26.4 KG/M2 | WEIGHT: 149 LBS

## 2019-11-18 PROCEDURE — 36415 COLL VENOUS BLD VENIPUNCTURE: CPT

## 2019-11-18 PROCEDURE — 99214 OFFICE O/P EST MOD 30 MIN: CPT | Mod: 25

## 2019-11-18 NOTE — ASSESSMENT
[FreeTextEntry1] : pt's INR controlled...continue same Coumadin dose \par \par will continue with PCP in the neighborhood to control INR.\par \par Start Hydrea 500 mg daily...follow CBC with PCP and I will adjust hydrea dose...\par \par f/u here 1 m.

## 2019-11-18 NOTE — HISTORY OF PRESENT ILLNESS
[de-identified] : 64 y/o recently diagnosed with Talat 2 positive ET , s/p thrombosis and extension of sup sagittal venous thrombosis...pt on Coumadin and Aspirin(81mg) now...\par \par aware of risks for bleeding...INR today 2.8 . [de-identified] : 11- doing well...INR controlled...discussed with pt and her sister starting Hydrea...

## 2019-11-25 ENCOUNTER — RESULT CHARGE (OUTPATIENT)
Age: 64
End: 2019-11-25

## 2019-11-25 LAB
INR PPP: 2.2 RATIO
POCT-PROTHROMBIN TIME: 26.3 SECS

## 2019-11-26 ENCOUNTER — RESULT REVIEW (OUTPATIENT)
Age: 64
End: 2019-11-26

## 2019-12-06 LAB
BASOPHILS # BLD AUTO: 0.07 K/UL
BASOPHILS NFR BLD AUTO: 0.9 %
EOSINOPHIL # BLD AUTO: 0.18 K/UL
EOSINOPHIL NFR BLD AUTO: 2.2 %
HCT VFR BLD CALC: 41 %
HGB BLD-MCNC: 12.3 G/DL
IMM GRANULOCYTES NFR BLD AUTO: 0.5 %
LYMPHOCYTES # BLD AUTO: 2.85 K/UL
LYMPHOCYTES NFR BLD AUTO: 35 %
MAN DIFF?: NORMAL
MCHC RBC-ENTMCNC: 29.1 PG
MCHC RBC-ENTMCNC: 30 GM/DL
MCV RBC AUTO: 97.2 FL
MONOCYTES # BLD AUTO: 0.83 K/UL
MONOCYTES NFR BLD AUTO: 10.2 %
NEUTROPHILS # BLD AUTO: 4.17 K/UL
NEUTROPHILS NFR BLD AUTO: 51.2 %
PLATELET # BLD AUTO: 843 K/UL
RBC # BLD: 4.22 M/UL
RBC # FLD: 13.2 %
WBC # FLD AUTO: 8.14 K/UL

## 2019-12-11 LAB
BASOPHILS # BLD AUTO: 0.07 K/UL
BASOPHILS NFR BLD AUTO: 0.9 %
EOSINOPHIL # BLD AUTO: 0.25 K/UL
EOSINOPHIL NFR BLD AUTO: 3.4 %
HCT VFR BLD CALC: 39.4 %
HGB BLD-MCNC: 11.9 G/DL
IMM GRANULOCYTES NFR BLD AUTO: 0.4 %
LYMPHOCYTES # BLD AUTO: 2.4 K/UL
LYMPHOCYTES NFR BLD AUTO: 32.5 %
MAN DIFF?: NORMAL
MCHC RBC-ENTMCNC: 29 PG
MCHC RBC-ENTMCNC: 30.2 GM/DL
MCV RBC AUTO: 95.9 FL
MONOCYTES # BLD AUTO: 0.86 K/UL
MONOCYTES NFR BLD AUTO: 11.6 %
NEUTROPHILS # BLD AUTO: 3.78 K/UL
NEUTROPHILS NFR BLD AUTO: 51.2 %
PLATELET # BLD AUTO: 798 K/UL
RBC # BLD: 4.11 M/UL
RBC # FLD: 14 %
WBC # FLD AUTO: 7.39 K/UL

## 2019-12-23 ENCOUNTER — APPOINTMENT (OUTPATIENT)
Dept: HEMATOLOGY ONCOLOGY | Facility: CLINIC | Age: 64
End: 2019-12-23
Payer: MEDICARE

## 2019-12-23 VITALS
OXYGEN SATURATION: 97 % | SYSTOLIC BLOOD PRESSURE: 125 MMHG | HEART RATE: 64 BPM | HEIGHT: 63 IN | WEIGHT: 155.6 LBS | TEMPERATURE: 98 F | DIASTOLIC BLOOD PRESSURE: 70 MMHG | RESPIRATION RATE: 14 BRPM | BODY MASS INDEX: 27.57 KG/M2

## 2019-12-23 PROCEDURE — 99215 OFFICE O/P EST HI 40 MIN: CPT

## 2019-12-26 ENCOUNTER — RESULT CHARGE (OUTPATIENT)
Age: 64
End: 2019-12-26

## 2019-12-31 NOTE — ADDENDUM
[FreeTextEntry1] : Patient states she started taking the Hydrea 10 days ago when the prescription was first given... I informed patient that repeat CBC platelets are now up to 843,000... She will have a repeat CBC on December 9 at her PCPs office

## 2019-12-31 NOTE — ASSESSMENT
[FreeTextEntry1] : Discussed with patient and her sister Talat 2+ essential thrombocytosis.  I explained to them that this is a malignant process that needs to be controlled in somebody who already had 2 episode of thrombosis.  Patient is taking Hydrea 500 mg daily, and Coumadin monitored by her PCP in her neighborhood.\par \par \par Her most recent platelet count was 793,000 and she was advised to increase Hydrea to 1000 mg On Monday Wednesday and Friday, and have CBC with  every PT INR study.\par \par Patient was given a list of the side effects of Hydrea from up-to-date.\par \par Follow-up here in 3 months or sooner if needed.

## 2019-12-31 NOTE — CONSULT LETTER
[Dear  ___] : Dear  [unfilled], [Consult Letter:] : I had the pleasure of evaluating your patient, [unfilled]. [Please see my note below.] : Please see my note below. [Consult Closing:] : Thank you very much for allowing me to participate in the care of this patient.  If you have any questions, please do not hesitate to contact me. [FreeTextEntry3] : Neva Martin MD\par  [Sincerely,] : Sincerely,

## 2020-01-01 LAB
BASOPHILS # BLD AUTO: 0.08 K/UL
BASOPHILS NFR BLD AUTO: 0.9 %
EOSINOPHIL # BLD AUTO: 0.2 K/UL
EOSINOPHIL NFR BLD AUTO: 2.3 %
HCT VFR BLD CALC: 39.7 %
HGB BLD-MCNC: 12.3 G/DL
IMM GRANULOCYTES NFR BLD AUTO: 0.3 %
LYMPHOCYTES # BLD AUTO: 2.76 K/UL
LYMPHOCYTES NFR BLD AUTO: 32 %
MAN DIFF?: NORMAL
MCHC RBC-ENTMCNC: 29.6 PG
MCHC RBC-ENTMCNC: 31 GM/DL
MCV RBC AUTO: 95.7 FL
MONOCYTES # BLD AUTO: 0.74 K/UL
MONOCYTES NFR BLD AUTO: 8.6 %
NEUTROPHILS # BLD AUTO: 4.81 K/UL
NEUTROPHILS NFR BLD AUTO: 55.9 %
PLATELET # BLD AUTO: 728 K/UL
RBC # BLD: 4.15 M/UL
RBC # FLD: 14.1 %
WBC # FLD AUTO: 8.62 K/UL

## 2020-01-02 ENCOUNTER — RESULT CHARGE (OUTPATIENT)
Age: 65
End: 2020-01-02

## 2020-01-02 LAB
INR PPP: 1.9 RATIO
POCT-PROTHROMBIN TIME: 22.6 SECS

## 2020-01-06 ENCOUNTER — RESULT CHARGE (OUTPATIENT)
Age: 65
End: 2020-01-06

## 2020-01-07 LAB
INR PPP: 1.4 RATIO
INR PPP: 1.5 RATIO
POCT-PROTHROMBIN TIME: 16.3 SECS
POCT-PROTHROMBIN TIME: 18.4 SECS

## 2020-01-10 ENCOUNTER — RESULT CHARGE (OUTPATIENT)
Age: 65
End: 2020-01-10

## 2020-01-14 ENCOUNTER — RESULT CHARGE (OUTPATIENT)
Age: 65
End: 2020-01-14

## 2020-01-15 ENCOUNTER — FORM ENCOUNTER (OUTPATIENT)
Age: 65
End: 2020-01-15

## 2020-01-16 ENCOUNTER — OUTPATIENT (OUTPATIENT)
Dept: OUTPATIENT SERVICES | Facility: HOSPITAL | Age: 65
LOS: 1 days | End: 2020-01-16
Payer: MEDICARE

## 2020-01-16 ENCOUNTER — APPOINTMENT (OUTPATIENT)
Dept: MRI IMAGING | Facility: HOSPITAL | Age: 65
End: 2020-01-16
Payer: MEDICARE

## 2020-01-16 DIAGNOSIS — Z90.09 ACQUIRED ABSENCE OF OTHER PART OF HEAD AND NECK: Chronic | ICD-10-CM

## 2020-01-16 LAB
INR PPP: 2.1 RATIO
INR PPP: 2.8 RATIO
POCT-PROTHROMBIN TIME: 24.8 SECS
POCT-PROTHROMBIN TIME: 33.9 SECS

## 2020-01-16 PROCEDURE — A9585: CPT

## 2020-01-16 PROCEDURE — 70545 MR ANGIOGRAPHY HEAD W/DYE: CPT | Mod: 26

## 2020-01-16 PROCEDURE — 70545 MR ANGIOGRAPHY HEAD W/DYE: CPT

## 2020-01-22 ENCOUNTER — RESULT CHARGE (OUTPATIENT)
Age: 65
End: 2020-01-22

## 2020-01-27 ENCOUNTER — APPOINTMENT (OUTPATIENT)
Dept: NEUROSURGERY | Facility: CLINIC | Age: 65
End: 2020-01-27
Payer: MEDICARE

## 2020-01-27 VITALS
OXYGEN SATURATION: 98 % | RESPIRATION RATE: 16 BRPM | BODY MASS INDEX: 26.58 KG/M2 | DIASTOLIC BLOOD PRESSURE: 74 MMHG | HEIGHT: 63 IN | SYSTOLIC BLOOD PRESSURE: 160 MMHG | WEIGHT: 150 LBS | HEART RATE: 51 BPM | TEMPERATURE: 97.5 F

## 2020-01-27 LAB
INR PPP: 2.4 RATIO
POCT-PROTHROMBIN TIME: 29 SECS

## 2020-01-27 PROCEDURE — 99215 OFFICE O/P EST HI 40 MIN: CPT

## 2020-01-28 NOTE — REASON FOR VISIT
[Follow-Up: _____] : a [unfilled] follow-up visit [Family Member] : family member [FreeTextEntry1] : She returns to discuss recent MRI/MRV of the brain and for further discussion of Gamma Knife Radiosurgery for meningioma.\par \par She has been on warfarin X 3 months for venous sinus thrombosis. She has followed with Dr. Martin as well as Dr. 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 \par \par Denies new neurological symptoms.

## 2020-01-28 NOTE — HISTORY OF PRESENT ILLNESS
[de-identified] : 63 year old woman with past medical history thyroid cancer s/p thyroidectomy in 2006 followed by radiation and asthma referred by Dr. Daquan Tejeda for consideration of Gamma Knife Radiosurgery for LEFT cavernous meningioma.\par \par She began to have RIGHT sided headaches at the end of August 2019 followed by nausea/vomiting. She saw her PCP and was treated for viral gastroenteritis. However, she continued to have persistent headaches and had MRI/MRA done in September 2019 which demonstrated a LEFT cavernous meningioma. \par \par She was seen by Dr. Tejeda on 9/26/19 and recommended evaluation for consideration of Gamma Knife Radiosurgery to meningioma. Additionally, the MRI did show possible narrowing of the LEFT intracavernous carotid narrowing. Due to this finding, Dr. Tejeda also recommended NOVA to evaluate for decreased blood flow due to possible LEFT carotid narrowing.\par \par Reviewed in tumor board on 9/30/19 and MRI demonstrated a large acute sagittal thrombosis. She was seen by Dr. Felder and is now currently on lovenox bid. She is also following up with Dr. Martin.\par \par She reports RIGHT sided headaches. Denies focal weakness, changes in vision/speech, numbness/tingling, seizures, dizziness.\par \par

## 2020-01-28 NOTE — DATA REVIEWED
[de-identified] : \par EXAM: MR VENOGRAM BRAIN IC \par \par PROCEDURE DATE: 01/16/2020 \par \par \par \par INTERPRETATION: PROCEDURE: MRV of the brain without and with contrast \par \par INDICATION: Sagittal sinus thrombosis on anticoagulation, left skull base \par meningioma \par \par TECHNIQUE: The MRV was performed utilizing sagittal 3-D phase-contrast and \par coronal 2-D time-of-flight technique with MIP series provided. Axial \par T2-weighted FLAIR and diffusion imaging of the brain is obtained. \par Intravascular flow quantification was performed on the intracranial veins \par using gated 2-D phase contrast technique as part of Non-invasive Optimal \par Vessel analysis (NOVA). After the uneventful intravenous administration of \par 7.5 cc contrast, SPGR imaging is performed to assess venous patency. \par \par COMPARISON: MRV 10/10/2019, and outside contrast-enhanced prior MR from \par 09/11/2019 \par \par FINDINGS: \par \par There is restored flow signal in the distal most superior sagittal sinus \par (series 203, image 12, for example) compared to 10/10/2019. Overall, there \par remains poor flow signal in the majority of the sinus. Anteriorly, flow \par signal resides within frontal paramedian cortical veins and posteriorly \par there is more normal flow near the torcula. The internal cerebral veins are \par patent as is the straight sinus. Both transverse sinuses are patent with \par hypoplasia or stenosis on the right. The sigmoid sinuses are patent as are \par the jugular veins. The right jugular bulb appears hypoplastic with \par prominence of the inferior petrosal sinus. \par \par Postcontrast images show contrast enhancement of the sagittal sinus without \par expansile appearance. There is linear filling defect of thrombus seen on \par postcontrast imaging in the more posterior aspect of the sinus (see key \par images in PACS). On comparison to 9/11/2019, filling defect and burden of \par thrombus is markedly improved. No filling defect in other sinuses, \par particularly at the right transverse sinus with slower flow signal. \par \par Diffusion imaging is negative for recent infarction. T2-FLAIR imaging is \par unchanged. Left skull base meningioma centered at the cavernous sinus has \par not significant changed based on T2-FLAIR comparison. There is attenuated \par caliber and flow-void of the left internal carotid artery. This should be \par further mapped more accurately with dedicated skull base MRI. There is \par dilatation of the left superior opthalmic vein. \par \par NOVA quantifies flow as follows, listed in units mL/min: \par \par Superior sagittal sinus: 36, previously 18. \par Inferior sagittal sinus: 87, previously 27. \par Vein of Jefe: 36 \par Straight sinus: 178 \par Right Serjio: 27 \par Left Serjio: 64 \par Right transverse 31 \par Left transverse 237 \par Right sigmoid 70 \par Left sigmoid 381 \par Right  \par Left  \par \par \par \par IMPRESSION: \par \par Improved, though still slow, flow signal in the superior sagittal sinus, \par with increase in quantifiable flow confirmed with NOVA compared to \par 10/10/2019. \par \par On contrast imaging, there is small residual strand of thrombus in the \par posterior sagittal sinus, though majority of clot has resolved since \par 9/11/2019. \par \par \par \par \par \par Thank you for the opportunity to participate in the care of this patient. \par \par \par \par NEERAJ DANIELLE M.D. ATTENDING RADIOLOGIST \par This document has been electronically signed. Jan 16 2020 1:34PM \par \par \par \par \par \par \par \par \par    \par  \par  \par \par \par Bookmarks \par   \par    \par \par  \par \par  \par \par  \par \par \par 1\par    \par \par \par 2\par    \par \par \par 3\par    \par \par \par 4\par    \par \par \par 5\par    \par \par \par 6\par    \par \par \par 7\par    \par \par \par 8\par    \par \par \par 9\par    \par \par

## 2020-01-28 NOTE — ASSESSMENT
[FreeTextEntry1] : MRI/MRV done on 1/16/2020 reviewed by Dr. Oro with patient and patient's son, demonstrates stable meningioma.\par Discussed GKRS vs continued imaging surveillance. Risks, benefits and alternatives to treatment options discussed in detail. \par Given stability on serial imaging, patient and patient's son would like to repeat imaging.\par Recommend repeat MRI brain with and without contrast in 6 months (July 2020). Patient will call office in June 2020. Return to the office to see Dr. Oro at that time to discuss treatment recommendations.\par Continue to follow up with Dr. Martin and Dr. Felder for management of venous sinus thrombosis.\par Education provided regarding plan of care.\par \par Patient and patient's son verbalize agreement and understanding with plan.

## 2020-01-28 NOTE — PHYSICAL EXAM
[General Appearance - In No Acute Distress] : in no acute distress [General Appearance - Alert] : alert [Oriented To Time, Place, And Person] : oriented to person, place, and time [Motor Tone] : muscle tone was normal in all four extremities [Motor Strength] : muscle strength was normal in all four extremities [Sclera] : the sclera and conjunctiva were normal [Outer Ear] : the ears and nose were normal in appearance [Neck Appearance] : the appearance of the neck was normal [] : no respiratory distress [Abnormal Walk] : normal gait [Skin Color & Pigmentation] : normal skin color and pigmentation

## 2020-01-29 ENCOUNTER — RESULT CHARGE (OUTPATIENT)
Age: 65
End: 2020-01-29

## 2020-01-30 LAB
INR PPP: 1.5 RATIO
POCT-PROTHROMBIN TIME: 18.5 SECS

## 2020-02-05 ENCOUNTER — RESULT CHARGE (OUTPATIENT)
Age: 65
End: 2020-02-05

## 2020-02-06 RX ORDER — WARFARIN 7.5 MG/1
7.5 TABLET ORAL
Qty: 30 | Refills: 0 | Status: DISCONTINUED | COMMUNITY
Start: 1900-01-01 | End: 2020-02-06

## 2020-02-09 ENCOUNTER — OUTPATIENT (OUTPATIENT)
Dept: OUTPATIENT SERVICES | Facility: HOSPITAL | Age: 65
LOS: 1 days | End: 2020-02-09
Payer: MEDICARE

## 2020-02-09 ENCOUNTER — APPOINTMENT (OUTPATIENT)
Dept: MRI IMAGING | Facility: HOSPITAL | Age: 65
End: 2020-02-09
Payer: MEDICARE

## 2020-02-09 ENCOUNTER — FORM ENCOUNTER (OUTPATIENT)
Age: 65
End: 2020-02-09

## 2020-02-09 DIAGNOSIS — Z90.09 ACQUIRED ABSENCE OF OTHER PART OF HEAD AND NECK: Chronic | ICD-10-CM

## 2020-02-10 PROCEDURE — 70548 MR ANGIOGRAPHY NECK W/DYE: CPT | Mod: 26

## 2020-02-10 PROCEDURE — 70548 MR ANGIOGRAPHY NECK W/DYE: CPT

## 2020-02-10 PROCEDURE — A9585: CPT

## 2020-02-13 ENCOUNTER — RESULT CHARGE (OUTPATIENT)
Age: 65
End: 2020-02-13

## 2020-02-18 LAB
BASOPHILS # BLD AUTO: 0.07 K/UL
BASOPHILS NFR BLD AUTO: 1 %
EOSINOPHIL # BLD AUTO: 0.15 K/UL
EOSINOPHIL NFR BLD AUTO: 2.1 %
HCT VFR BLD CALC: 38.9 %
HGB BLD-MCNC: 12 G/DL
IMM GRANULOCYTES NFR BLD AUTO: 0.3 %
INR PPP: 2.2 RATIO
INR PPP: 2.3 RATIO
LYMPHOCYTES # BLD AUTO: 2.39 K/UL
LYMPHOCYTES NFR BLD AUTO: 33.8 %
MAN DIFF?: NORMAL
MCHC RBC-ENTMCNC: 30.8 GM/DL
MCHC RBC-ENTMCNC: 30.9 PG
MCV RBC AUTO: 100.3 FL
MONOCYTES # BLD AUTO: 0.84 K/UL
MONOCYTES NFR BLD AUTO: 11.9 %
NEUTROPHILS # BLD AUTO: 3.61 K/UL
NEUTROPHILS NFR BLD AUTO: 50.9 %
PLATELET # BLD AUTO: 461 K/UL
POCT-PROTHROMBIN TIME: 26.4 SECS
RBC # BLD: 3.88 M/UL
RBC # FLD: 14.5 %
WBC # FLD AUTO: 7.08 K/UL

## 2020-02-19 NOTE — PROGRESS NOTE ADULT - PROBLEM SELECTOR PROBLEM 4
Never smoker
Thrombocytosis
Leukocytosis
Thrombocytosis
Leukocytosis
Thrombocytosis
Thrombocytosis

## 2020-02-20 LAB
INR PPP: 2.3 RATIO
POCT-PROTHROMBIN TIME: 27.1 SECS

## 2020-02-27 ENCOUNTER — RESULT CHARGE (OUTPATIENT)
Age: 65
End: 2020-02-27

## 2020-03-01 LAB
INR PPP: 2 RATIO
POCT-PROTHROMBIN TIME: 24.4 SECS

## 2020-03-13 ENCOUNTER — RESULT CHARGE (OUTPATIENT)
Age: 65
End: 2020-03-13

## 2020-03-18 LAB
INR PPP: 2.8 RATIO
POCT-PROTHROMBIN TIME: 33 SECS

## 2020-03-23 ENCOUNTER — APPOINTMENT (OUTPATIENT)
Dept: HEMATOLOGY ONCOLOGY | Facility: CLINIC | Age: 65
End: 2020-03-23
Payer: MEDICARE

## 2020-03-23 ENCOUNTER — APPOINTMENT (OUTPATIENT)
Dept: HEMATOLOGY ONCOLOGY | Facility: CLINIC | Age: 65
End: 2020-03-23

## 2020-03-23 PROCEDURE — 99442: CPT

## 2020-03-25 ENCOUNTER — APPOINTMENT (OUTPATIENT)
Dept: NEUROLOGY | Facility: CLINIC | Age: 65
End: 2020-03-25
Payer: MEDICARE

## 2020-03-25 PROCEDURE — 99213 OFFICE O/P EST LOW 20 MIN: CPT | Mod: 95

## 2020-03-25 NOTE — PHYSICAL EXAM
[FreeTextEntry1] : The patient is alert and oriented x3, naming intact x3, repetition normal, follows three-step commands, and is able to participate fully in the history taking.\par Speech is normal with no evidence of dysarthria.\par \par Memory is intact: Immediate recall 3 out of 3, short-term 3 out of 3, remote memory intact\par \par Cranial nerves II through XII -  intact \par \par Motor exam: Upper and lower extremities grossly normal  power, No abnormal movements noted.\par \par Coordination and vestibular exam: Finger to nose intact, no evidence of truncal or appendicular ataxia. No evidence of nystagmus. \par \par Gait: Normal stance and gait.\par \par  [Neck Cervical Mass (___cm)] : no neck mass was observed [Auscultation Breath Sounds / Voice Sounds] : lungs were clear to auscultation bilaterally [Heart Sounds] : normal S1 and S2 [No Spinal Tenderness] : no spinal tenderness

## 2020-03-25 NOTE — CONSULT LETTER
[FreeTextEntry3] : Shira Felder MD\par Board Certified Neurology and Vascular Neurology\par Professor of Neurology\par [facility]\par [facility]\par [facility]\par \par [email or web address]\par [phone]\par

## 2020-03-25 NOTE — ASSESSMENT
[FreeTextEntry1] : Verbal consent given on  __03/25/2020______ and 4:00 pm  by Jessica Freeman\par Start time ___4.00 pm_____________\par End time __4:15 pm___________\par \par \par Plan \par 6 month follow up\par Cont Meds \par cont Coumadin. repeat MRV in one year\par patient to follow up with Dr. Oro for meningioma. \par

## 2020-03-25 NOTE — HISTORY OF PRESENT ILLNESS
[FreeTextEntry1] : \par Interval history - \par No headaches. \par MRV in February improved superior and inferior sagittal sinus with small residual clot in superior sagittal sinus.  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 Tejeda. \par \par Patient had gone to [facility] and came back August 27th and had headaches severe- 10/10 pain. Had gone to the doctor and was told she had the stomach flu due vomiting August 29th. Patient even had gone to the ER too 9/2- went to [facility]; CT was done- normal. Patient states this is the first week where she doesn't have the headaches- in the beginning went to see an ENT who gave her flonase but didn't have sinusitis. She was taking Tylenol and didn't work. She tried Motrin which helped but had to take it every 4 hours. Patient was given gabapentin 300mg which was given by her neurologist. She said it helps her sleep but doesn't take it all the time and would only take one. \par \par When patient would have the headaches, she would have right eye tearing. Patient also thought the dentist may have thought the headaches were related to teeth- but nothing was shown on the CT scan. Dentist gave her clindamycin - he says she may have sinusitis. \par \par Patient saw Dr. Tejeda on the 26th of September- did MRI in [place] prior. She knew from her past of neurologist she had a meningoma but wanted a consultation. \par \par No family history of migraines and no personal history of migraines- however 10 years ago, she had severe headaches. She realized though she was drinking a lot of water with aspartame on a daily excessively- she did her own research and stopped and the headaches immediately resolved. \par \par Patient had miscarriage- first trimester in the past. Patient never had clots in legs.

## 2020-03-27 ENCOUNTER — RESULT CHARGE (OUTPATIENT)
Age: 65
End: 2020-03-27

## 2020-03-27 LAB
INR PPP: 2.5 RATIO
POCT-PROTHROMBIN TIME: 30.3 SECS

## 2020-03-31 ENCOUNTER — APPOINTMENT (OUTPATIENT)
Dept: NEUROLOGY | Facility: CLINIC | Age: 65
End: 2020-03-31

## 2020-03-31 LAB
BASOPHILS # BLD AUTO: 0.05 K/UL
BASOPHILS NFR BLD AUTO: 0.9 %
EOSINOPHIL # BLD AUTO: 0.16 K/UL
EOSINOPHIL NFR BLD AUTO: 2.8 %
HCT VFR BLD CALC: 44.1 %
HGB BLD-MCNC: 13.4 G/DL
IMM GRANULOCYTES NFR BLD AUTO: 0.2 %
LYMPHOCYTES # BLD AUTO: 1.8 K/UL
LYMPHOCYTES NFR BLD AUTO: 31 %
MAN DIFF?: NORMAL
MCHC RBC-ENTMCNC: 30.4 GM/DL
MCHC RBC-ENTMCNC: 31.8 PG
MCV RBC AUTO: 104.8 FL
MONOCYTES # BLD AUTO: 0.59 K/UL
MONOCYTES NFR BLD AUTO: 10.2 %
NEUTROPHILS # BLD AUTO: 3.19 K/UL
NEUTROPHILS NFR BLD AUTO: 54.9 %
PLATELET # BLD AUTO: 480 K/UL
RBC # BLD: 4.21 M/UL
RBC # FLD: 12.7 %
WBC # FLD AUTO: 5.8 K/UL

## 2020-04-02 ENCOUNTER — RESULT CHARGE (OUTPATIENT)
Age: 65
End: 2020-04-02

## 2020-04-06 ENCOUNTER — RX RENEWAL (OUTPATIENT)
Age: 65
End: 2020-04-06

## 2020-04-06 LAB
INR PPP: 3.1 RATIO
POCT-PROTHROMBIN TIME: 36.8 SECS

## 2020-04-13 ENCOUNTER — RESULT CHARGE (OUTPATIENT)
Age: 65
End: 2020-04-13

## 2020-04-17 LAB
INR PPP: 2.2 RATIO
POCT-PROTHROMBIN TIME: 26.9 SECS

## 2020-05-08 ENCOUNTER — RESULT CHARGE (OUTPATIENT)
Age: 65
End: 2020-05-08

## 2020-05-11 LAB
BASOPHILS # BLD AUTO: 0.03 K/UL
BASOPHILS NFR BLD AUTO: 0.5 %
EOSINOPHIL # BLD AUTO: 0.14 K/UL
EOSINOPHIL NFR BLD AUTO: 2.1 %
HCT VFR BLD CALC: 42.1 %
HGB BLD-MCNC: 12.7 G/DL
IMM GRANULOCYTES NFR BLD AUTO: 0.3 %
INR PPP: 2 RATIO
LYMPHOCYTES # BLD AUTO: 2.6 K/UL
LYMPHOCYTES NFR BLD AUTO: 39.9 %
MAN DIFF?: NORMAL
MCHC RBC-ENTMCNC: 30.2 GM/DL
MCHC RBC-ENTMCNC: 30.5 PG
MCV RBC AUTO: 101 FL
MONOCYTES # BLD AUTO: 0.72 K/UL
MONOCYTES NFR BLD AUTO: 11 %
NEUTROPHILS # BLD AUTO: 3.01 K/UL
NEUTROPHILS NFR BLD AUTO: 46.2 %
PLATELET # BLD AUTO: 591 K/UL
POCT-PROTHROMBIN TIME: 23.7 SECS
RBC # BLD: 4.17 M/UL
RBC # FLD: 12.4 %
WBC # FLD AUTO: 6.52 K/UL

## 2020-05-14 ENCOUNTER — APPOINTMENT (OUTPATIENT)
Dept: HEMATOLOGY ONCOLOGY | Facility: CLINIC | Age: 65
End: 2020-05-14
Payer: MEDICARE

## 2020-05-14 PROCEDURE — 99214 OFFICE O/P EST MOD 30 MIN: CPT | Mod: 95

## 2020-05-14 NOTE — HISTORY OF PRESENT ILLNESS
[de-identified] : May 14, 2020 patient never had repeat blood work with PCP and platelet count was up to 591,000... She was taking Hydrea only at thousand milligram Monday Wednesday Friday...

## 2020-05-14 NOTE — ASSESSMENT
[FreeTextEntry1] : Discussed with patient the need to take the Hydrea 500 mg daily with at thousand milligram on Monday Wednesday and Friday...\par \par She is to continue with monthly monitoring of her CBC by her PCP...\par \par Patient's INR is in therapeutic range between 2 and 3... She is to continue Coumadin for now...\par \par Follow-up with us in person in 2 months... Prior to patient hopefully leaving to go to [place] in the summer.

## 2020-05-15 ENCOUNTER — RESULT CHARGE (OUTPATIENT)
Age: 65
End: 2020-05-15

## 2020-05-18 LAB
INR PPP: 2.1 RATIO
POCT-PROTHROMBIN TIME: 25.5 SECS

## 2020-06-01 ENCOUNTER — RESULT CHARGE (OUTPATIENT)
Age: 65
End: 2020-06-01

## 2020-06-02 LAB
INR PPP: 2.9 RATIO
POCT-PROTHROMBIN TIME: 34.4 SECS

## 2020-07-07 ENCOUNTER — NON-APPOINTMENT (OUTPATIENT)
Age: 65
End: 2020-07-07

## 2020-07-07 ENCOUNTER — APPOINTMENT (OUTPATIENT)
Dept: INTERNAL MEDICINE | Facility: CLINIC | Age: 65
End: 2020-07-07
Payer: MEDICARE

## 2020-07-07 VITALS
SYSTOLIC BLOOD PRESSURE: 108 MMHG | HEART RATE: 59 BPM | TEMPERATURE: 98.3 F | WEIGHT: 149 LBS | RESPIRATION RATE: 14 BRPM | OXYGEN SATURATION: 96 % | DIASTOLIC BLOOD PRESSURE: 72 MMHG | BODY MASS INDEX: 26.39 KG/M2

## 2020-07-07 DIAGNOSIS — Z87.891 PERSONAL HISTORY OF NICOTINE DEPENDENCE: ICD-10-CM

## 2020-07-07 LAB
INR PPP: 3.4 RATIO
POCT-PROTHROMBIN TIME: 41.1 SECS
QUALITY CONTROL: YES

## 2020-07-07 PROCEDURE — G0439: CPT

## 2020-07-07 PROCEDURE — G0402 INITIAL PREVENTIVE EXAM: CPT

## 2020-07-07 PROCEDURE — 93000 ELECTROCARDIOGRAM COMPLETE: CPT

## 2020-07-07 PROCEDURE — G0403: CPT

## 2020-07-07 PROCEDURE — 85610 PROTHROMBIN TIME: CPT | Mod: QW

## 2020-07-07 RX ORDER — WARFARIN 10 MG/1
10 TABLET ORAL
Qty: 60 | Refills: 0 | Status: DISCONTINUED | COMMUNITY
Start: 2020-04-06 | End: 2020-07-07

## 2020-07-07 NOTE — PHYSICAL EXAM
[No Acute Distress] : no acute distress [Well Nourished] : well nourished [Well Developed] : well developed [Well-Appearing] : well-appearing [Normal Sclera/Conjunctiva] : normal sclera/conjunctiva [PERRL] : pupils equal round and reactive to light [EOMI] : extraocular movements intact [Normal Outer Ear/Nose] : the outer ears and nose were normal in appearance [Normal Oropharynx] : the oropharynx was normal [No JVD] : no jugular venous distention [Supple] : supple [No Lymphadenopathy] : no lymphadenopathy [Thyroid Normal, No Nodules] : the thyroid was normal and there were no nodules present [No Respiratory Distress] : no respiratory distress  [No Accessory Muscle Use] : no accessory muscle use [Clear to Auscultation] : lungs were clear to auscultation bilaterally [Normal Rate] : normal rate  [Normal S1, S2] : normal S1 and S2 [Regular Rhythm] : with a regular rhythm [No Murmur] : no murmur heard [No Carotid Bruits] : no carotid bruits [No Abdominal Bruit] : a ~M bruit was not heard ~T in the abdomen [No Varicosities] : no varicosities [Pedal Pulses Present] : the pedal pulses are present [No Edema] : there was no peripheral edema [No Palpable Aorta] : no palpable aorta [No Extremity Clubbing/Cyanosis] : no extremity clubbing/cyanosis [Normal Appearance] : normal in appearance [No Nipple Discharge] : no nipple discharge [No Axillary Lymphadenopathy] : no axillary lymphadenopathy [Soft] : abdomen soft [Non Tender] : non-tender [Non-distended] : non-distended [No Masses] : no abdominal mass palpated [Normal Bowel Sounds] : normal bowel sounds [No HSM] : no HSM [Normal Posterior Cervical Nodes] : no posterior cervical lymphadenopathy [Normal Anterior Cervical Nodes] : no anterior cervical lymphadenopathy [No Spinal Tenderness] : no spinal tenderness [No CVA Tenderness] : no CVA  tenderness [No Joint Swelling] : no joint swelling [Grossly Normal Strength/Tone] : grossly normal strength/tone [No Rash] : no rash [Coordination Grossly Intact] : coordination grossly intact [No Focal Deficits] : no focal deficits [Normal Gait] : normal gait [Deep Tendon Reflexes (DTR)] : deep tendon reflexes were 2+ and symmetric [Normal Insight/Judgement] : insight and judgment were intact [Normal Affect] : the affect was normal

## 2020-07-10 LAB
25(OH)D3 SERPL-MCNC: 30.4 NG/ML
ALBUMIN SERPL ELPH-MCNC: 4.6 G/DL
ALP BLD-CCNC: 69 U/L
ALT SERPL-CCNC: 22 U/L
ANION GAP SERPL CALC-SCNC: 13 MMOL/L
APPEARANCE: CLEAR
AST SERPL-CCNC: 21 U/L
BACTERIA: NEGATIVE
BASOPHILS # BLD AUTO: 0.07 K/UL
BASOPHILS NFR BLD AUTO: 1 %
BILIRUB SERPL-MCNC: 0.2 MG/DL
BILIRUBIN URINE: NEGATIVE
BLOOD URINE: NORMAL
BUN SERPL-MCNC: 16 MG/DL
CALCIUM SERPL-MCNC: 9.3 MG/DL
CHLORIDE SERPL-SCNC: 103 MMOL/L
CHOLEST SERPL-MCNC: 188 MG/DL
CHOLEST/HDLC SERPL: 4.2 RATIO
CO2 SERPL-SCNC: 26 MMOL/L
COLOR: NORMAL
CREAT SERPL-MCNC: 0.96 MG/DL
CREAT SPEC-SCNC: 50 MG/DL
EOSINOPHIL # BLD AUTO: 0.12 K/UL
EOSINOPHIL NFR BLD AUTO: 1.7 %
ESTIMATED AVERAGE GLUCOSE: 91 MG/DL
FERRITIN SERPL-MCNC: 125 NG/ML
FOLATE SERPL-MCNC: 13.9 NG/ML
GLUCOSE QUALITATIVE U: NEGATIVE
GLUCOSE SERPL-MCNC: 90 MG/DL
GLUCOSE SERPL-MCNC: 91 MG/DL
HBA1C MFR BLD HPLC: 4.8 %
HCT VFR BLD CALC: 43.2 %
HDLC SERPL-MCNC: 45 MG/DL
HGB BLD-MCNC: 13.1 G/DL
HYALINE CASTS: 0 /LPF
IMM GRANULOCYTES NFR BLD AUTO: 0.3 %
IRON SERPL-MCNC: 98 UG/DL
KETONES URINE: NEGATIVE
LDLC SERPL CALC-MCNC: 112 MG/DL
LEUKOCYTE ESTERASE URINE: ABNORMAL
LYMPHOCYTES # BLD AUTO: 2.36 K/UL
LYMPHOCYTES NFR BLD AUTO: 32.5 %
MAN DIFF?: NORMAL
MCHC RBC-ENTMCNC: 30.3 GM/DL
MCHC RBC-ENTMCNC: 31.4 PG
MCV RBC AUTO: 103.6 FL
MICROALBUMIN 24H UR DL<=1MG/L-MCNC: <1.2 MG/DL
MICROALBUMIN/CREAT 24H UR-RTO: NORMAL MG/G
MICROSCOPIC-UA: NORMAL
MONOCYTES # BLD AUTO: 0.77 K/UL
MONOCYTES NFR BLD AUTO: 10.6 %
NEUTROPHILS # BLD AUTO: 3.93 K/UL
NEUTROPHILS NFR BLD AUTO: 53.9 %
NITRITE URINE: NEGATIVE
PH URINE: 6.5
PLATELET # BLD AUTO: 572 K/UL
POTASSIUM SERPL-SCNC: 5.1 MMOL/L
PROT SERPL-MCNC: 7 G/DL
PROTEIN URINE: NEGATIVE
RBC # BLD: 4.17 M/UL
RBC # FLD: 13.3 %
RED BLOOD CELLS URINE: 7 /HPF
SARS-COV-2 IGG SERPL IA-ACNC: 0.01 INDEX
SARS-COV-2 IGG SERPL QL IA: NEGATIVE
SODIUM SERPL-SCNC: 142 MMOL/L
SPECIFIC GRAVITY URINE: 1.01
SQUAMOUS EPITHELIAL CELLS: 1 /HPF
T4 FREE SERPL-MCNC: 2.2 NG/DL
TRIGL SERPL-MCNC: 156 MG/DL
TSH SERPL-ACNC: 0.01 UIU/ML
UROBILINOGEN URINE: NORMAL
VIT B12 SERPL-MCNC: 825 PG/ML
WBC # FLD AUTO: 7.27 K/UL
WHITE BLOOD CELLS URINE: 1 /HPF

## 2020-07-15 ENCOUNTER — RESULT CHARGE (OUTPATIENT)
Age: 65
End: 2020-07-15

## 2020-07-17 DIAGNOSIS — D25.9 LEIOMYOMA OF UTERUS, UNSPECIFIED: ICD-10-CM

## 2020-07-17 LAB
INR PPP: 3.7 RATIO
POCT-PROTHROMBIN TIME: 43.8 SECS

## 2020-07-20 ENCOUNTER — APPOINTMENT (OUTPATIENT)
Dept: UROLOGY | Facility: CLINIC | Age: 65
End: 2020-07-20
Payer: MEDICARE

## 2020-07-20 VITALS
BODY MASS INDEX: 26.58 KG/M2 | DIASTOLIC BLOOD PRESSURE: 66 MMHG | HEART RATE: 57 BPM | RESPIRATION RATE: 14 BRPM | OXYGEN SATURATION: 96 % | HEIGHT: 63 IN | SYSTOLIC BLOOD PRESSURE: 120 MMHG | WEIGHT: 150 LBS

## 2020-07-20 DIAGNOSIS — M85.80 OTHER SPECIFIED DISORDERS OF BONE DENSITY AND STRUCTURE, UNSPECIFIED SITE: ICD-10-CM

## 2020-07-20 PROCEDURE — 99204 OFFICE O/P NEW MOD 45 MIN: CPT

## 2020-07-20 NOTE — ASSESSMENT
[FreeTextEntry1] : Very pleasant 64-year-old female with microscopic hematuria\par -urinalysis reviewed\par -urine culture\par -CT Urogram images reviewed\par -cystoscopy\par -Extensive discussion of the potential etiologies of microscopic hematuria, as well as the need to complete full work up.  Patient understands and wishes to proceed.

## 2020-07-20 NOTE — PHYSICAL EXAM
[General Appearance - Well Developed] : well developed [General Appearance - Well Nourished] : well nourished [Normal Appearance] : normal appearance [General Appearance - In No Acute Distress] : no acute distress [Well Groomed] : well groomed [Edema] : no peripheral edema [Respiration, Rhythm And Depth] : normal respiratory rhythm and effort [Exaggerated Use Of Accessory Muscles For Inspiration] : no accessory muscle use [Abdomen Soft] : soft [Costovertebral Angle Tenderness] : no ~M costovertebral angle tenderness [Abdomen Tenderness] : non-tender [Urinary Bladder Findings] : the bladder was normal on palpation [Normal Station and Gait] : the gait and station were normal for the patient's age [] : no rash [No Focal Deficits] : no focal deficits [Affect] : the affect was normal [Oriented To Time, Place, And Person] : oriented to person, place, and time [Mood] : the mood was normal [Not Anxious] : not anxious [No Palpable Adenopathy] : no palpable adenopathy

## 2020-07-20 NOTE — HISTORY OF PRESENT ILLNESS
[Urinary Retention] : no urinary retention [FreeTextEntry1] : Very pleasant 64 year old woman who presents for evaluation of microscopic hematuria found on urinalysis approximately 2 weeks ago to 7 red blood cells per high-powered field. She reports no history of gross hematuria.  No flank pain. No suprapubic pain. No dysuria.  No urinary frequency, urgency. No history of urinary tract infections or renal impairment. No aggravating or alleviating factors that she knows of contributing to hematuria.\par \par No family history of  malignancy.  No family history of nephrolithiasis.  She does not smoke.  Recent CT scan demonstrated small bilateral simple renal cysts but no other urologic abnormalities\par  [Urinary Urgency] : no urinary urgency [Nocturia] : no nocturia [Urinary Frequency] : no urinary frequency [Hematuria - Gross] : no gross hematuria [Straining] : no straining [Hematuria - Microscopic] : microscopic hematuria

## 2020-07-21 DIAGNOSIS — R92.2 INCONCLUSIVE MAMMOGRAM: ICD-10-CM

## 2020-07-23 ENCOUNTER — RESULT CHARGE (OUTPATIENT)
Age: 65
End: 2020-07-23

## 2020-07-23 LAB
APPEARANCE: CLEAR
BACTERIA: NEGATIVE
BILIRUBIN URINE: NEGATIVE
BLOOD URINE: NEGATIVE
COLOR: YELLOW
GLUCOSE QUALITATIVE U: NEGATIVE
HYALINE CASTS: 0 /LPF
KETONES URINE: NEGATIVE
LEUKOCYTE ESTERASE URINE: NEGATIVE
MICROSCOPIC-UA: NORMAL
NITRITE URINE: NEGATIVE
PH URINE: 6.5
PROTEIN URINE: NORMAL
RED BLOOD CELLS URINE: 13 /HPF
SPECIFIC GRAVITY URINE: 1.02
SQUAMOUS EPITHELIAL CELLS: 1 /HPF
UROBILINOGEN URINE: NORMAL
WHITE BLOOD CELLS URINE: 2 /HPF

## 2020-07-27 LAB
BACTERIA UR CULT: ABNORMAL
INR PPP: 2.1 RATIO
POCT-PROTHROMBIN TIME: 25 SECS

## 2020-07-29 ENCOUNTER — APPOINTMENT (OUTPATIENT)
Dept: UROLOGY | Facility: CLINIC | Age: 65
End: 2020-07-29
Payer: MEDICARE

## 2020-07-29 DIAGNOSIS — R31.29 OTHER MICROSCOPIC HEMATURIA: ICD-10-CM

## 2020-07-29 PROCEDURE — 52000 CYSTOURETHROSCOPY: CPT

## 2020-08-07 ENCOUNTER — RESULT CHARGE (OUTPATIENT)
Age: 65
End: 2020-08-07

## 2020-08-07 LAB
INR PPP: 1.7 RATIO
POCT-PROTHROMBIN TIME: 20.2 SECS

## 2020-08-20 ENCOUNTER — RESULT CHARGE (OUTPATIENT)
Age: 65
End: 2020-08-20

## 2020-08-21 LAB
INR PPP: 2.5 RATIO
POCT-PROTHROMBIN TIME: 29.8 SECS

## 2020-08-26 LAB — BACTERIA UR CULT: ABNORMAL

## 2020-08-28 ENCOUNTER — APPOINTMENT (OUTPATIENT)
Dept: MRI IMAGING | Facility: HOSPITAL | Age: 65
End: 2020-08-28
Payer: MEDICARE

## 2020-08-28 ENCOUNTER — RESULT REVIEW (OUTPATIENT)
Age: 65
End: 2020-08-28

## 2020-08-28 ENCOUNTER — OUTPATIENT (OUTPATIENT)
Dept: OUTPATIENT SERVICES | Facility: HOSPITAL | Age: 65
LOS: 1 days | End: 2020-08-28
Payer: MEDICARE

## 2020-08-28 DIAGNOSIS — Z90.09 ACQUIRED ABSENCE OF OTHER PART OF HEAD AND NECK: Chronic | ICD-10-CM

## 2020-08-28 PROCEDURE — 70553 MRI BRAIN STEM W/O & W/DYE: CPT

## 2020-08-28 PROCEDURE — A9585: CPT

## 2020-08-28 PROCEDURE — 70553 MRI BRAIN STEM W/O & W/DYE: CPT | Mod: 26

## 2020-09-02 ENCOUNTER — APPOINTMENT (OUTPATIENT)
Dept: INTERNAL MEDICINE | Facility: CLINIC | Age: 65
End: 2020-09-02
Payer: MEDICARE

## 2020-09-02 ENCOUNTER — TRANSCRIPTION ENCOUNTER (OUTPATIENT)
Age: 65
End: 2020-09-02

## 2020-09-02 VITALS
SYSTOLIC BLOOD PRESSURE: 106 MMHG | RESPIRATION RATE: 14 BRPM | DIASTOLIC BLOOD PRESSURE: 66 MMHG | HEART RATE: 63 BPM | WEIGHT: 154 LBS | BODY MASS INDEX: 27.29 KG/M2 | HEIGHT: 63 IN | OXYGEN SATURATION: 96 % | TEMPERATURE: 97.3 F

## 2020-09-02 DIAGNOSIS — N39.0 URINARY TRACT INFECTION, SITE NOT SPECIFIED: ICD-10-CM

## 2020-09-02 PROCEDURE — 99214 OFFICE O/P EST MOD 30 MIN: CPT

## 2020-09-02 PROCEDURE — 85610 PROTHROMBIN TIME: CPT | Mod: QW

## 2020-09-02 RX ORDER — NITROFURANTOIN MACROCRYSTALS 100 MG/1
100 CAPSULE ORAL
Qty: 10 | Refills: 0 | Status: DISCONTINUED | COMMUNITY
Start: 2020-07-27 | End: 2020-09-02

## 2020-09-02 RX ORDER — CIPROFLOXACIN HYDROCHLORIDE 500 MG/1
500 TABLET, FILM COATED ORAL TWICE DAILY
Qty: 10 | Refills: 0 | Status: DISCONTINUED | COMMUNITY
Start: 2020-08-26 | End: 2020-09-02

## 2020-09-02 NOTE — PHYSICAL EXAM
[No Acute Distress] : no acute distress [Well Nourished] : well nourished [Well Developed] : well developed [Well-Appearing] : well-appearing [PERRL] : pupils equal round and reactive to light [Normal Sclera/Conjunctiva] : normal sclera/conjunctiva [EOMI] : extraocular movements intact [Normal Oropharynx] : the oropharynx was normal [Normal Outer Ear/Nose] : the outer ears and nose were normal in appearance [No JVD] : no jugular venous distention [No Lymphadenopathy] : no lymphadenopathy [Supple] : supple [Thyroid Normal, No Nodules] : the thyroid was normal and there were no nodules present [No Respiratory Distress] : no respiratory distress  [Clear to Auscultation] : lungs were clear to auscultation bilaterally [No Accessory Muscle Use] : no accessory muscle use [Regular Rhythm] : with a regular rhythm [Normal S1, S2] : normal S1 and S2 [Normal Rate] : normal rate  [No Murmur] : no murmur heard [No Carotid Bruits] : no carotid bruits [No Abdominal Bruit] : a ~M bruit was not heard ~T in the abdomen [No Varicosities] : no varicosities [No Edema] : there was no peripheral edema [Pedal Pulses Present] : the pedal pulses are present [Normal Appearance] : normal in appearance [No Extremity Clubbing/Cyanosis] : no extremity clubbing/cyanosis [No Palpable Aorta] : no palpable aorta [No Nipple Discharge] : no nipple discharge [Soft] : abdomen soft [No Axillary Lymphadenopathy] : no axillary lymphadenopathy [Non Tender] : non-tender [No HSM] : no HSM [No Masses] : no abdominal mass palpated [Non-distended] : non-distended [Normal Bowel Sounds] : normal bowel sounds [Normal Posterior Cervical Nodes] : no posterior cervical lymphadenopathy [Normal Anterior Cervical Nodes] : no anterior cervical lymphadenopathy [No Spinal Tenderness] : no spinal tenderness [No CVA Tenderness] : no CVA  tenderness [No Joint Swelling] : no joint swelling [Grossly Normal Strength/Tone] : grossly normal strength/tone [Coordination Grossly Intact] : coordination grossly intact [No Rash] : no rash [No Focal Deficits] : no focal deficits [Normal Affect] : the affect was normal [Deep Tendon Reflexes (DTR)] : deep tendon reflexes were 2+ and symmetric [Normal Gait] : normal gait [Normal Insight/Judgement] : insight and judgment were intact

## 2020-09-03 LAB
BASOPHILS # BLD AUTO: 0.07 K/UL
BASOPHILS NFR BLD AUTO: 0.9 %
EOSINOPHIL # BLD AUTO: 0.19 K/UL
EOSINOPHIL NFR BLD AUTO: 2.5 %
HCT VFR BLD CALC: 40.9 %
HGB BLD-MCNC: 12.7 G/DL
IMM GRANULOCYTES NFR BLD AUTO: 0.4 %
LYMPHOCYTES # BLD AUTO: 1.94 K/UL
LYMPHOCYTES NFR BLD AUTO: 25.5 %
MAN DIFF?: NORMAL
MCHC RBC-ENTMCNC: 31.1 GM/DL
MCHC RBC-ENTMCNC: 31.5 PG
MCV RBC AUTO: 101.5 FL
MONOCYTES # BLD AUTO: 0.79 K/UL
MONOCYTES NFR BLD AUTO: 10.4 %
NEUTROPHILS # BLD AUTO: 4.6 K/UL
NEUTROPHILS NFR BLD AUTO: 60.3 %
PLATELET # BLD AUTO: 588 K/UL
RBC # BLD: 4.03 M/UL
RBC # FLD: 12.6 %
WBC # FLD AUTO: 7.62 K/UL

## 2020-09-14 ENCOUNTER — APPOINTMENT (OUTPATIENT)
Dept: NEUROSURGERY | Facility: CLINIC | Age: 65
End: 2020-09-14
Payer: COMMERCIAL

## 2020-09-14 VITALS
HEIGHT: 63 IN | RESPIRATION RATE: 18 BRPM | SYSTOLIC BLOOD PRESSURE: 106 MMHG | WEIGHT: 154 LBS | DIASTOLIC BLOOD PRESSURE: 68 MMHG | TEMPERATURE: 98.4 F | HEART RATE: 54 BPM | BODY MASS INDEX: 27.29 KG/M2 | OXYGEN SATURATION: 98 %

## 2020-09-14 PROCEDURE — 99215 OFFICE O/P EST HI 40 MIN: CPT

## 2020-09-14 NOTE — REASON FOR VISIT
[Follow-Up: _____] : a [unfilled] follow-up visit [Family Member] : family member [FreeTextEntry1] : \par TODAY'S VISIT:\par Returns to review MRI brain with and without contrast done on 8/28/2020 to evaluate stability of LEFT cavernous sinus meningioma.\par Denies headaches, dizziness, nausea/vomiting, numbness/tingling, changes in vision/speech since prior visit.\par \par \par PREVIOUS OFFICE VISIT 1/27/2020:\par She returns to discuss recent MRI/MRV of the brain and for further discussion of Gamma Knife Radiosurgery for meningioma.\par \par She has been on warfarin X 3 months for venous sinus thrombosis. She has followed with Dr. Martin as well as Dr. 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 \par \par Denies new neurological symptoms.

## 2020-09-14 NOTE — ASSESSMENT
[FreeTextEntry1] : MRI brain with and without contrast from 8/28/2020 reviewed by Dr. Oro with patient, demonstrates no change in LEFT cavernous meningioma.\par Discussed treatment with GKRS. Favor early treatment with GKRS given size and proximity to cranial nerves and critical brain structures.\par  Risks, alternatives and benefits to Gamma Knife Radiosurgery discussed. Risks include but are not limited to cerebral edema, radionecrosis, seizures, continued tumor growth. Patient understands and wishes to proceed.\par Explained procedure to patient in detail including head frame placement, new MRI the day of procedure, radiation treatment, and post-procedure care. \par Radiation treatment will take place at [address] [place] [address]. Directions and contact information provided to patient.\par \par She was advised to schedule an appointment with Dr. Montanez for further discussion of GKRS.\par After discussing GKRS with Dr. Montanez and with her family, she will notify our office if she would like to proceed.\par If she would not like to proceed with GKRS at this time, recommend MRI brain with and without contrast in 6 months (February 2021).\par \par Patient verbalizes agreement and understanding with plan of care.\par \par \par \par

## 2020-09-14 NOTE — DATA REVIEWED
[de-identified] : \par EXAM: MR BRAIN WAW IC \par \par PROCEDURE DATE: 08/28/2020 \par \par \par \par INTERPRETATION: PROCEDURE: MRI Brain with and without contrast \par \par INDICATION: Left cavernous meningioma follow-up \par \par TECHNIQUE: Sagittal T1 volumetric series with MPR, axial T2, T2-FLAIR, T2-FFE and diffusion imaging of the brain is obtained. Following the intravenous administration of 7.5 cc Gadavist contrast material, sagittal T1 volumetric imaging with MPR provided. \par \par COMPARISON: MRI brain 10/12/2019, MR venogram 120 \par \par FINDINGS: \par \par Again demonstrated is a mass centered in the left cavernous sinus which demonstrates isointense signal to gray matter on T1 and T2 weighted imaging and avid contrast enhancement compatible with meningioma. The mass measures 2.9 cm AP x 1.9 cm transverse x 2.5 cm craniocaudal and is not significantly changed from MR venogram 1/16/2020. Again demonstrated is involvement of the left middle cranial fossa, left Meckel's cave and possible involvement of the left orbital apex. \par \par Again noted is soft tissue encasing and narrowing cavernous left internal carotid artery although there is preservation of the flow void. The remainder of the large vessel flow voids are preserved. \par \par Again demonstrated are strands of the nonocclusive thrombus within the superior sagittal sinus posteriorly which is similar to prior MR venogram. The remainder of the dural venous sinuses are patent. \par \par The ventricles and sulci are normal in size and configuration. \par \par There is no mass effect or midline shift. There is no acute intracranial hemorrhage. \par \par There are scattered foci of T2/FLAIR hyperintensity within the periventricular and subcortical white matter which likely in the basis of chronic microvascular ischemia. The diffusion-weighted images demonstrate no recent infarction. \par \par There is no susceptibility related signal loss to suggest hemosiderin deposition or calcification. \par \par There is a left maxillary sinus polyp/retention cyst. There is mild mucosal thickening of the ethmoid air cells.. The mastoid air cells are well-aerated. \par \par IMPRESSION: \par \par No significant change in meningioma centered within the left cavernous sinus measuring 2.9 x 1.9 x 2.5 cm. The mass involves the left Meckel's cave and left middle cranial fossa as well as possibly the left orbital apex. The mass encases and narrows the cavernous left internal carotid artery although there is preserved flow void demonstrating patency. \par \par Again seen is strandy of nonocclusive thrombus within the posterior superior sagittal sinus is not significantly changed since prior MR venogram. \par \par \par \par \par Thank you for the opportunity to participate in the care of this patient. \par \par \par \par ESTELLA GUTIERREZ M.D., ATTENDING RADIOLOGIST \par This document has been electronically signed. Aug 28 2020 3:45PM \par \par \par

## 2020-09-14 NOTE — PHYSICAL EXAM
[Oriented To Time, Place, And Person] : oriented to person, place, and time [General Appearance - Alert] : alert [General Appearance - In No Acute Distress] : in no acute distress [Cranial Nerves Optic (II)] : visual acuity intact bilaterally,  pupils equal round and reactive to light [Cranial Nerves Oculomotor (III)] : extraocular motion intact [Cranial Nerves Trigeminal (V)] : facial sensation intact symmetrically [Cranial Nerves Facial (VII)] : face symmetrical [Cranial Nerves Glossopharyngeal (IX)] : tongue and palate midline [Cranial Nerves Accessory (XI - Cranial And Spinal)] : head turning and shoulder shrug symmetric [Cranial Nerves Vestibulocochlear (VIII)] : hearing was intact bilaterally [Cranial Nerves Hypoglossal (XII)] : there was no tongue deviation with protrusion [Motor Strength] : muscle strength was normal in all four extremities [Motor Tone] : muscle tone was normal in all four extremities [Sclera] : the sclera and conjunctiva were normal [Neck Appearance] : the appearance of the neck was normal [Outer Ear] : the ears and nose were normal in appearance [Skin Color & Pigmentation] : normal skin color and pigmentation [] : no respiratory distress [Abnormal Walk] : normal gait

## 2020-09-14 NOTE — HISTORY OF PRESENT ILLNESS
[de-identified] : 63 year old woman with past medical history thyroid cancer s/p thyroidectomy in 2006 followed by radiation and asthma referred by Dr. Daquan Tejeda for consideration of Gamma Knife Radiosurgery for LEFT cavernous meningioma.\par \par She began to have RIGHT sided headaches at the end of August 2019 followed by nausea/vomiting. She saw her PCP and was treated for viral gastroenteritis. However, she continued to have persistent headaches and had MRI/MRA done in September 2019 which demonstrated a LEFT cavernous meningioma. \par \par She was seen by Dr. Tejeda on 9/26/19 and recommended evaluation for consideration of Gamma Knife Radiosurgery to meningioma. Additionally, the MRI did show possible narrowing of the LEFT intracavernous carotid narrowing. Due to this finding, Dr. Tejeda also recommended NOVA to evaluate for decreased blood flow due to possible LEFT carotid narrowing.\par \par Reviewed in tumor board on 9/30/19 and MRI demonstrated a large acute sagittal thrombosis. She was seen by Dr. Felder and is now currently on lovenox bid. She is also following up with Dr. Martin.\par \par She reports RIGHT sided headaches. Denies focal weakness, changes in vision/speech, numbness/tingling, seizures, dizziness.\par \par

## 2020-09-15 ENCOUNTER — APPOINTMENT (OUTPATIENT)
Dept: NEUROLOGY | Facility: CLINIC | Age: 65
End: 2020-09-15
Payer: MEDICARE

## 2020-09-15 VITALS
HEIGHT: 63 IN | HEART RATE: 48 BPM | SYSTOLIC BLOOD PRESSURE: 128 MMHG | BODY MASS INDEX: 26.75 KG/M2 | DIASTOLIC BLOOD PRESSURE: 80 MMHG | WEIGHT: 151 LBS | TEMPERATURE: 97.9 F | OXYGEN SATURATION: 98 %

## 2020-09-15 PROCEDURE — 99214 OFFICE O/P EST MOD 30 MIN: CPT

## 2020-09-17 ENCOUNTER — RESULT CHARGE (OUTPATIENT)
Age: 65
End: 2020-09-17

## 2020-09-18 LAB
INR PPP: 2.4 RATIO
POCT-PROTHROMBIN TIME: 29 SECS

## 2020-09-25 ENCOUNTER — APPOINTMENT (OUTPATIENT)
Dept: MRI IMAGING | Facility: HOSPITAL | Age: 65
End: 2020-09-25

## 2020-09-25 ENCOUNTER — OUTPATIENT (OUTPATIENT)
Dept: OUTPATIENT SERVICES | Facility: HOSPITAL | Age: 65
LOS: 1 days | End: 2020-09-25
Payer: MEDICARE

## 2020-09-25 DIAGNOSIS — Z90.09 ACQUIRED ABSENCE OF OTHER PART OF HEAD AND NECK: Chronic | ICD-10-CM

## 2020-09-25 PROCEDURE — 70544 MR ANGIOGRAPHY HEAD W/O DYE: CPT | Mod: 26

## 2020-09-25 PROCEDURE — 70544 MR ANGIOGRAPHY HEAD W/O DYE: CPT

## 2020-09-29 ENCOUNTER — APPOINTMENT (OUTPATIENT)
Dept: RADIATION ONCOLOGY | Facility: CLINIC | Age: 65
End: 2020-09-29
Payer: MEDICARE

## 2020-09-29 VITALS
SYSTOLIC BLOOD PRESSURE: 136 MMHG | BODY MASS INDEX: 26.93 KG/M2 | RESPIRATION RATE: 18 BRPM | DIASTOLIC BLOOD PRESSURE: 74 MMHG | WEIGHT: 152.01 LBS | HEART RATE: 46 BPM | OXYGEN SATURATION: 97 %

## 2020-09-29 DIAGNOSIS — Z82.5 FAMILY HISTORY OF ASTHMA AND OTHER CHRONIC LOWER RESPIRATORY DISEASES: ICD-10-CM

## 2020-09-29 PROCEDURE — 99205 OFFICE O/P NEW HI 60 MIN: CPT | Mod: 25,GC

## 2020-09-29 NOTE — PHYSICAL EXAM
[] : no respiratory distress [Nondistended] : nondistended [Normal] : oriented to person, place and time, the affect was normal, the mood was normal and not anxious

## 2020-09-30 NOTE — REVIEW OF SYSTEMS
[Negative] : Psychiatric [Fever] : no fever [Chills] : no chills [Night Sweats] : no night sweats [Dysphagia] : no dysphagia [Loss of Hearing] : no loss of hearing [Chest Pain] : no chest pain [Palpitations] : no palpitations [Shortness Of Breath] : no shortness of breath [Cough] : no cough [Abdominal Pain] : no abdominal pain [Vomiting] : no vomiting [Constipation] : no constipation [Confused] : no confusion [Dizziness] : no dizziness [Fainting] : no fainting [Difficulty Walking] : no difficulty walking [de-identified] : denies headaches

## 2020-09-30 NOTE — LETTER CLOSING
[FreeTextEntry3] : Mendez Montanez MD\par Attending Physician\par Department of Radiation Medicine\par \par \par

## 2020-09-30 NOTE — HISTORY OF PRESENT ILLNESS
[FreeTextEntry1] : Ms. Pitts is a 63yo woman presenting for consideration of GKSRS for left cavernous sinus meningioma.  \par \par She has a past medical history thyroid cancer s/p thyroidectomy in 2006 followed by radiation.  She began to have RIGHT sided headaches at the end of August 2019 followed by nausea/vomiting. She saw her PCP and was treated for viral gastroenteritis. However, she continued to have persistent headaches and had MRI/MRA done in September 2019 which demonstrated a LEFT cavernous meningioma. \par \par She was seen by Dr. Tejeda on 9/26/19 and recommended evaluation for consideration of Gamma Knife Radiosurgery to meningioma. Additionally, the MRI did show possible narrowing of the LEFT intracavernous carotid narrowing. Due to this finding, Dr. Tejeda also recommended NOVA to evaluate for decreased blood flow due to possible LEFT carotid narrowing.Reviewed in tumor board on 9/30/19 and MRI demonstrated a large acute sagittal thrombosis. She was seen by Dr. Felder (neuro) and is now currently on lovenox bid. She is also following up with Dr. Martin (heme). Today she feels well. Notes headaches resolved after she started taking coumadin. Denies focal weakness, numbness, tingling, nausea, vomiting. Overall feeling well.  She still regularly feels some discomfort along the V2 distribution of the left side of her face.

## 2020-10-12 ENCOUNTER — APPOINTMENT (OUTPATIENT)
Dept: HEMATOLOGY ONCOLOGY | Facility: CLINIC | Age: 65
End: 2020-10-12
Payer: MEDICARE

## 2020-10-12 VITALS
TEMPERATURE: 98 F | OXYGEN SATURATION: 97 % | BODY MASS INDEX: 27.21 KG/M2 | HEIGHT: 63 IN | DIASTOLIC BLOOD PRESSURE: 80 MMHG | WEIGHT: 153.6 LBS | SYSTOLIC BLOOD PRESSURE: 123 MMHG | RESPIRATION RATE: 16 BRPM | HEART RATE: 59 BPM

## 2020-10-12 PROCEDURE — 36415 COLL VENOUS BLD VENIPUNCTURE: CPT

## 2020-10-12 PROCEDURE — 99215 OFFICE O/P EST HI 40 MIN: CPT | Mod: 25

## 2020-10-12 RX ORDER — WARFARIN 5 MG/1
5 TABLET ORAL
Qty: 90 | Refills: 3 | Status: DISCONTINUED | COMMUNITY
Start: 2019-11-04 | End: 2020-10-12

## 2020-10-12 RX ORDER — WARFARIN 10 MG/1
10 TABLET ORAL
Qty: 90 | Refills: 3 | Status: DISCONTINUED | COMMUNITY
Start: 2020-02-06 | End: 2020-10-12

## 2020-10-12 RX ORDER — NITROFURANTOIN (MONOHYDRATE/MACROCRYSTALS) 25; 75 MG/1; MG/1
100 CAPSULE ORAL
Qty: 10 | Refills: 0 | Status: DISCONTINUED | COMMUNITY
Start: 2020-07-27 | End: 2020-10-12

## 2020-10-12 NOTE — ASSESSMENT
[FreeTextEntry1] : Discussed with patient the need to take the Hydrea 500 mg daily, add at 1000 mg on Monday and Friday... Patient to continue to have monthly CBC done with PCP.\par \par Case discussed at length with patient and Dr. Felder... Since we found  data using NOACs in patients with MPN, patient will be switched to Eliquis.\par \par She is to stop Coumadin today, and start Eliquis 5 mg p.o. twice daily once INR is below 2.\par \par All this was discussed in detail with patient and Dr. Felder, and patient was given written information from up-to-date about Eliquis.\par \par She is to undergo SRS in December, and I discussed this with Dr. Oro, and there is no objection to patient being on Eliquis at the time of the procedure.\par \par Patient to follow-up with me in 4 to 5 months.

## 2020-10-12 NOTE — HISTORY OF PRESENT ILLNESS
[de-identified] : May 14, 2020 patient never had repeat blood work with PCP and platelet count was up to 591,000... She was taking Hydrea only at thousand milligram Monday Wednesday Friday...\par \par October 12, 2020 patient returns for follow-up... Main complaint is that she gained weight because she is not eating green vegetables salad because she is on Coumadin...

## 2020-10-12 NOTE — CONSULT LETTER
[Dear  ___] : Dear  [unfilled], [Consult Letter:] : I had the pleasure of evaluating your patient, [unfilled]. [Please see my note below.] : Please see my note below. [Consult Closing:] : Thank you very much for allowing me to participate in the care of this patient.  If you have any questions, please do not hesitate to contact me. [Sincerely,] : Sincerely, [FreeTextEntry3] : Neva Martin MD\par

## 2020-10-13 LAB
BASOPHILS # BLD AUTO: 0.06 K/UL
BASOPHILS NFR BLD AUTO: 0.7 %
EOSINOPHIL # BLD AUTO: 0.2 K/UL
EOSINOPHIL NFR BLD AUTO: 2.4 %
HCT VFR BLD CALC: 42.7 %
HGB BLD-MCNC: 13.1 G/DL
IMM GRANULOCYTES NFR BLD AUTO: 0.4 %
INR PPP: 2.86 RATIO
LYMPHOCYTES # BLD AUTO: 2.82 K/UL
LYMPHOCYTES NFR BLD AUTO: 33.6 %
MAN DIFF?: NORMAL
MCHC RBC-ENTMCNC: 30.7 GM/DL
MCHC RBC-ENTMCNC: 31.1 PG
MCV RBC AUTO: 101.4 FL
MONOCYTES # BLD AUTO: 0.8 K/UL
MONOCYTES NFR BLD AUTO: 9.5 %
NEUTROPHILS # BLD AUTO: 4.48 K/UL
NEUTROPHILS NFR BLD AUTO: 53.4 %
PLATELET # BLD AUTO: 671 K/UL
PT BLD: 32.4 SEC
RBC # BLD: 4.21 M/UL
RBC # FLD: 12.9 %
WBC # FLD AUTO: 8.39 K/UL

## 2020-10-15 ENCOUNTER — APPOINTMENT (OUTPATIENT)
Dept: INTERNAL MEDICINE | Facility: CLINIC | Age: 65
End: 2020-10-15
Payer: MEDICARE

## 2020-10-15 VITALS
HEART RATE: 50 BPM | BODY MASS INDEX: 27.11 KG/M2 | SYSTOLIC BLOOD PRESSURE: 132 MMHG | OXYGEN SATURATION: 94 % | HEIGHT: 63 IN | TEMPERATURE: 97.3 F | DIASTOLIC BLOOD PRESSURE: 78 MMHG | RESPIRATION RATE: 12 BRPM | WEIGHT: 153 LBS

## 2020-10-15 VITALS — SYSTOLIC BLOOD PRESSURE: 120 MMHG | DIASTOLIC BLOOD PRESSURE: 80 MMHG

## 2020-10-15 PROCEDURE — 85610 PROTHROMBIN TIME: CPT | Mod: QW

## 2020-10-15 PROCEDURE — G0439: CPT

## 2020-10-15 PROCEDURE — G0008: CPT

## 2020-10-15 PROCEDURE — 90686 IIV4 VACC NO PRSV 0.5 ML IM: CPT

## 2020-10-15 PROCEDURE — G0402 INITIAL PREVENTIVE EXAM: CPT

## 2020-10-15 NOTE — PHYSICAL EXAM
[No Acute Distress] : no acute distress [Well Nourished] : well nourished [Well Developed] : well developed [Well-Appearing] : well-appearing [Normal Sclera/Conjunctiva] : normal sclera/conjunctiva [EOMI] : extraocular movements intact [PERRL] : pupils equal round and reactive to light [Normal Oropharynx] : the oropharynx was normal [Normal Outer Ear/Nose] : the outer ears and nose were normal in appearance [No JVD] : no jugular venous distention [No Lymphadenopathy] : no lymphadenopathy [Supple] : supple [Thyroid Normal, No Nodules] : the thyroid was normal and there were no nodules present [No Respiratory Distress] : no respiratory distress  [No Accessory Muscle Use] : no accessory muscle use [Clear to Auscultation] : lungs were clear to auscultation bilaterally [Normal Rate] : normal rate  [Regular Rhythm] : with a regular rhythm [Normal S1, S2] : normal S1 and S2 [No Murmur] : no murmur heard [No Abdominal Bruit] : a ~M bruit was not heard ~T in the abdomen [No Carotid Bruits] : no carotid bruits [No Varicosities] : no varicosities [Pedal Pulses Present] : the pedal pulses are present [No Edema] : there was no peripheral edema [No Palpable Aorta] : no palpable aorta [No Extremity Clubbing/Cyanosis] : no extremity clubbing/cyanosis [Normal Appearance] : normal in appearance [No Nipple Discharge] : no nipple discharge [No Axillary Lymphadenopathy] : no axillary lymphadenopathy [Soft] : abdomen soft [Non Tender] : non-tender [Non-distended] : non-distended [No Masses] : no abdominal mass palpated [No HSM] : no HSM [Normal Bowel Sounds] : normal bowel sounds [Normal Posterior Cervical Nodes] : no posterior cervical lymphadenopathy [Normal Anterior Cervical Nodes] : no anterior cervical lymphadenopathy [No CVA Tenderness] : no CVA  tenderness [No Spinal Tenderness] : no spinal tenderness [No Joint Swelling] : no joint swelling [Grossly Normal Strength/Tone] : grossly normal strength/tone [No Rash] : no rash [Coordination Grossly Intact] : coordination grossly intact [No Focal Deficits] : no focal deficits [Normal Gait] : normal gait [Deep Tendon Reflexes (DTR)] : deep tendon reflexes were 2+ and symmetric [Normal Affect] : the affect was normal [Normal Insight/Judgement] : insight and judgment were intact

## 2020-10-16 LAB
INR PPP: 1.3 RATIO
POCT-PROTHROMBIN TIME: 15.1 SECS
QUALITY CONTROL: YES

## 2020-11-19 ENCOUNTER — OUTPATIENT (OUTPATIENT)
Dept: OUTPATIENT SERVICES | Facility: HOSPITAL | Age: 65
LOS: 1 days | Discharge: HOME | End: 2020-11-19
Payer: MEDICARE

## 2020-11-19 DIAGNOSIS — Z90.09 ACQUIRED ABSENCE OF OTHER PART OF HEAD AND NECK: Chronic | ICD-10-CM

## 2020-12-23 PROBLEM — N39.0 ACUTE UTI: Status: RESOLVED | Noted: 2020-07-27 | Resolved: 2020-12-23

## 2020-12-30 ENCOUNTER — EMERGENCY (EMERGENCY)
Facility: HOSPITAL | Age: 65
LOS: 1 days | Discharge: HOME | End: 2020-12-30
Attending: EMERGENCY MEDICINE
Payer: MEDICARE

## 2020-12-30 VITALS
HEART RATE: 59 BPM | RESPIRATION RATE: 18 BRPM | TEMPERATURE: 97 F | OXYGEN SATURATION: 98 % | DIASTOLIC BLOOD PRESSURE: 72 MMHG | SYSTOLIC BLOOD PRESSURE: 145 MMHG | HEIGHT: 63 IN | WEIGHT: 147.93 LBS

## 2020-12-30 VITALS
DIASTOLIC BLOOD PRESSURE: 78 MMHG | OXYGEN SATURATION: 100 % | SYSTOLIC BLOOD PRESSURE: 154 MMHG | HEART RATE: 48 BPM | TEMPERATURE: 98 F | RESPIRATION RATE: 15 BRPM

## 2020-12-30 DIAGNOSIS — Z90.09 ACQUIRED ABSENCE OF OTHER PART OF HEAD AND NECK: Chronic | ICD-10-CM

## 2020-12-30 PROCEDURE — 73620 X-RAY EXAM OF FOOT: CPT

## 2020-12-30 PROCEDURE — 99283 EMERGENCY DEPT VISIT LOW MDM: CPT

## 2020-12-30 PROCEDURE — 73620 X-RAY EXAM OF FOOT: CPT | Mod: 26,RT

## 2020-12-30 PROCEDURE — 99283 EMERGENCY DEPT VISIT LOW MDM: CPT | Mod: GC

## 2020-12-30 RX ORDER — OXYCODONE HYDROCHLORIDE 5 MG/1
5 TABLET ORAL ONCE
Refills: 0 | Status: DISCONTINUED | OUTPATIENT
Start: 2020-12-30 | End: 2020-12-30

## 2020-12-30 RX ADMIN — OXYCODONE HYDROCHLORIDE 5 MILLIGRAM(S): 5 TABLET ORAL at 18:23

## 2020-12-30 RX ADMIN — OXYCODONE HYDROCHLORIDE 5 MILLIGRAM(S): 5 TABLET ORAL at 19:22

## 2020-12-30 RX ADMIN — Medication 20 MILLIGRAM(S): at 19:59

## 2020-12-30 NOTE — ED PROVIDER NOTE - NSFOLLOWUPINSTRUCTIONS_ED_ALL_ED_FT
You were seen in the ER for foot pain.     Please follow up with your primary care physician.   Please take prednisone as directed.     Please return to the ER for any worsening or new symptoms.

## 2020-12-30 NOTE — ED PROVIDER NOTE - OBJECTIVE STATEMENT
64 y/o female with a h/o superior sagittal sinus thrombosis, hypothyroidism, and meningioma, presenting with atraumatic right foot pain and swelling, which started at 1PM today. Patient states she was standing in her kitchen when she started having the pain. Acute onset, mild-moderate severity, sharp, nonradiating, relief with tylenol, no other associated symptoms. Denies any calf pain, knee pain, fever, chills, LE numbness/tingling, or LE weakness. 64 y/o female with a h/o superior sagittal sinus thrombosis, hypothyroidism, and meningioma, presenting with atraumatic right foot pain and swelling, which started at 1PM today. Patient states she was standing in her kitchen when she started having the pain. Acute onset, mild-moderate severity, sharp, nonradiating, relief with tylenol, no other associated symptoms. Denies any calf pain, knee pain, fever, chills, LE numbness/tingling, or LE weakness.     Attn - pt seen in OR3 - pt with onset of pain from 1-3pm on dorsum of R foot.  deep aching pain worse with mvm.  pt denies: trauma, prior episode, other joint aches or pain, fever, recent UTI, diabetes, hx of gout, use of diuretics.

## 2020-12-30 NOTE — ED PROVIDER NOTE - CARE PROVIDERS DIRECT ADDRESSES
[email or web address]

## 2020-12-30 NOTE — ED ADULT NURSE REASSESSMENT NOTE - NS ED NURSE REASSESS COMMENT FT1
Report received from Ariana FRANK in [place] @ 4349, patient seen resting comfortably in bed talking to family on phone. Patient states that heart rate usually runs low (as low as mid 30s) and this is normal for her. Patient denying any dizziness or any discomfort at this time. Awaiting dispo. Safety and comfort provided. Statement Selected

## 2020-12-30 NOTE — ED ADULT NURSE NOTE - OBJECTIVE STATEMENT
64 y/o female presents to the ED from home c/o right foot pain starting approx 1pm today. Pt states she was sitting and suddenly had severe pain in right foot, unable to walk. Denies fall/injury/trauma, fever, chills, n/v, weakness, abd pain, diarrhea/constipation, numbness/tingling, urinary s/s. Pt A&Ox3, in no respiratory distress, no CP, no visible swelling/ecchymosis, pulses present, skin warm to touch. PT safety maintained, call bell within reach and bed in the lowest position.

## 2020-12-30 NOTE — ED PROVIDER NOTE - PHYSICAL EXAMINATION
Musculoskeletal:   Right dorsal TTP and mild swelling, no redness  Normal right ankle and knee ROM   No b/l calf tenderness  2+ DP pulses b/l   Normal b/l LE sensation Musculoskeletal:   Right dorsal TTP and mild swelling, no redness  Normal right ankle and knee ROM   No b/l calf tenderness  2+ DP pulses b/l   Normal b/l LE sensation     Attn - alert, nad, Right lower extrem - no swelling, erythema, calf tenderness.  ankle - NT, slight tender dorsum lateral side of foot with increased warm, but no erythema, rash or lesion. N-V intact, no ecchymosis. sensation intact.

## 2020-12-30 NOTE — ED PROVIDER NOTE - CARE PROVIDER_API CALL
Sarah [facility]  INTERNAL [facility]  [address] [address] [place] [address]  Phone: [phone]  Fax: [phone]  Established Patient  Follow Up Time: 4-6 Days

## 2020-12-30 NOTE — ED PROVIDER NOTE - CLINICAL SUMMARY MEDICAL DECISION MAKING FREE TEXT BOX
Presenting with atraumatic right foot pain. Presenting with atraumatic right foot pain.     Attn - atraumatic acute pain dorsum lateral Right foot.  doubt fx.  prob acute arthritis like gout/pseudogout.  Xray, analgesia and poss NSAID or steroid. Presenting with atraumatic right foot pain.      Attn - atraumatic acute pain dorsum lateral Right foot.  doubt fx.  prob acute arthritis like gout/pseudogout.  Xray, analgesia and poss NSAID or steroid.  Xray foot unremarkable.   Given 20mg prednisone in ED. Will send rx prednisone to pharmacy.    Patient will follow up with PMD. Given return precautions.

## 2020-12-30 NOTE — ED ADULT NURSE NOTE - NSIMPLEMENTINTERV_GEN_ALL_ED
Implemented All Fall Risk Interventions:  [place] to call system. Call bell, personal items and telephone within reach. Instruct patient to call for assistance. Room bathroom lighting operational. Non-slip footwear when patient is off stretcher. Physically safe environment: no spills, clutter or unnecessary equipment. Stretcher in lowest position, wheels locked, appropriate side rails in place. Provide visual cue, wrist band, yellow gown, etc. Monitor gait and stability. Monitor for mental status changes and reorient to person, place, and time. Review medications for side effects contributing to fall risk. Reinforce activity limits and safety measures with patient and family.

## 2021-01-06 DIAGNOSIS — Z20.822 CONTACT WITH AND (SUSPECTED) EXPOSURE TO COVID-19: ICD-10-CM

## 2021-01-08 LAB — SARS-COV-2 N GENE NPH QL NAA+PROBE: NOT DETECTED

## 2021-01-28 ENCOUNTER — APPOINTMENT (OUTPATIENT)
Dept: INTERNAL MEDICINE | Facility: CLINIC | Age: 66
End: 2021-01-28
Payer: MEDICARE

## 2021-01-28 VITALS
TEMPERATURE: 97.7 F | SYSTOLIC BLOOD PRESSURE: 111 MMHG | HEIGHT: 63 IN | BODY MASS INDEX: 26.4 KG/M2 | HEART RATE: 61 BPM | OXYGEN SATURATION: 96 % | RESPIRATION RATE: 12 BRPM | DIASTOLIC BLOOD PRESSURE: 61 MMHG | WEIGHT: 149 LBS

## 2021-01-28 DIAGNOSIS — Z23 ENCOUNTER FOR IMMUNIZATION: ICD-10-CM

## 2021-01-28 PROCEDURE — 90732 PPSV23 VACC 2 YRS+ SUBQ/IM: CPT

## 2021-01-28 PROCEDURE — 99214 OFFICE O/P EST MOD 30 MIN: CPT | Mod: 25

## 2021-01-28 PROCEDURE — G0009: CPT

## 2021-01-28 NOTE — PHYSICAL EXAM
[Well Nourished] : well nourished [Well Developed] : well developed [Well-Appearing] : well-appearing [Normal Sclera/Conjunctiva] : normal sclera/conjunctiva [PERRL] : pupils equal round and reactive to light [EOMI] : extraocular movements intact [Normal Outer Ear/Nose] : the outer ears and nose were normal in appearance [Normal Oropharynx] : the oropharynx was normal [No JVD] : no jugular venous distention [No Lymphadenopathy] : no lymphadenopathy [Supple] : supple [Thyroid Normal, No Nodules] : the thyroid was normal and there were no nodules present [No Respiratory Distress] : no respiratory distress  [No Accessory Muscle Use] : no accessory muscle use [Clear to Auscultation] : lungs were clear to auscultation bilaterally [Normal Rate] : normal rate  [Regular Rhythm] : with a regular rhythm [Normal S1, S2] : normal S1 and S2 [No Murmur] : no murmur heard [No Carotid Bruits] : no carotid bruits [No Abdominal Bruit] : a ~M bruit was not heard ~T in the abdomen [No Varicosities] : no varicosities [Pedal Pulses Present] : the pedal pulses are present [No Edema] : there was no peripheral edema [No Palpable Aorta] : no palpable aorta [No Extremity Clubbing/Cyanosis] : no extremity clubbing/cyanosis [Normal Appearance] : normal in appearance [No Nipple Discharge] : no nipple discharge [No Axillary Lymphadenopathy] : no axillary lymphadenopathy [Soft] : abdomen soft [Non Tender] : non-tender [Non-distended] : non-distended [No Masses] : no abdominal mass palpated [No HSM] : no HSM [Normal Bowel Sounds] : normal bowel sounds [Normal Posterior Cervical Nodes] : no posterior cervical lymphadenopathy [Normal Anterior Cervical Nodes] : no anterior cervical lymphadenopathy [No CVA Tenderness] : no CVA  tenderness [No Spinal Tenderness] : no spinal tenderness [No Joint Swelling] : no joint swelling [Grossly Normal Strength/Tone] : grossly normal strength/tone [No Rash] : no rash [Coordination Grossly Intact] : coordination grossly intact [No Focal Deficits] : no focal deficits [Normal Gait] : normal gait [Deep Tendon Reflexes (DTR)] : deep tendon reflexes were 2+ and symmetric [Normal Affect] : the affect was normal [Normal Insight/Judgement] : insight and judgment were intact

## 2021-01-29 LAB
25(OH)D3 SERPL-MCNC: 32.2 NG/ML
ALBUMIN SERPL ELPH-MCNC: 4.6 G/DL
ALP BLD-CCNC: 68 U/L
ALT SERPL-CCNC: 23 U/L
ANION GAP SERPL CALC-SCNC: 11 MMOL/L
AST SERPL-CCNC: 19 U/L
BILIRUB SERPL-MCNC: 0.4 MG/DL
BUN SERPL-MCNC: 18 MG/DL
CALCIUM SERPL-MCNC: 9.3 MG/DL
CHLORIDE SERPL-SCNC: 102 MMOL/L
CO2 SERPL-SCNC: 25 MMOL/L
CREAT SERPL-MCNC: 0.98 MG/DL
CREAT SPEC-SCNC: 18 MG/DL
ESTIMATED AVERAGE GLUCOSE: 94 MG/DL
FERRITIN SERPL-MCNC: 117 NG/ML
FOLATE SERPL-MCNC: >20 NG/ML
GLUCOSE SERPL-MCNC: 85 MG/DL
GLUCOSE SERPL-MCNC: 88 MG/DL
HBA1C MFR BLD HPLC: 4.9 %
IRON SERPL-MCNC: 143 UG/DL
MICROALBUMIN 24H UR DL<=1MG/L-MCNC: <1.2 MG/DL
MICROALBUMIN/CREAT 24H UR-RTO: NORMAL MG/G
POTASSIUM SERPL-SCNC: 4.7 MMOL/L
PROT SERPL-MCNC: 7.1 G/DL
SARS-COV-2 IGG SERPL IA-ACNC: 0.07 INDEX
SARS-COV-2 IGG SERPL QL IA: NEGATIVE
SODIUM SERPL-SCNC: 139 MMOL/L
T4 FREE SERPL-MCNC: 1.8 NG/DL
TSH SERPL-ACNC: 0.02 UIU/ML
VIT B12 SERPL-MCNC: 939 PG/ML

## 2021-01-31 LAB
APPEARANCE: CLEAR
BILIRUBIN URINE: NEGATIVE
BLOOD URINE: NEGATIVE
COLOR: YELLOW
GLUCOSE QUALITATIVE U: NEGATIVE
KETONES URINE: NEGATIVE
LEUKOCYTE ESTERASE URINE: NEGATIVE
NITRITE URINE: NEGATIVE
PH URINE: 7.5
PROTEIN URINE: NEGATIVE
SPECIFIC GRAVITY URINE: 1.01
UROBILINOGEN URINE: NORMAL

## 2021-02-01 ENCOUNTER — APPOINTMENT (OUTPATIENT)
Dept: HEMATOLOGY ONCOLOGY | Facility: CLINIC | Age: 66
End: 2021-02-01

## 2021-02-02 LAB
BASOPHILS # BLD AUTO: 0.06 K/UL
BASOPHILS NFR BLD AUTO: 0.8 %
CHOLEST SERPL-MCNC: 207 MG/DL
EOSINOPHIL # BLD AUTO: 0.16 K/UL
EOSINOPHIL NFR BLD AUTO: 2.2 %
HCT VFR BLD CALC: 41 %
HDLC SERPL-MCNC: 58 MG/DL
HGB BLD-MCNC: 12.9 G/DL
IMM GRANULOCYTES NFR BLD AUTO: 0.1 %
LDLC SERPL CALC-MCNC: 132 MG/DL
LYMPHOCYTES # BLD AUTO: 2.24 K/UL
LYMPHOCYTES NFR BLD AUTO: 30.8 %
MAN DIFF?: NORMAL
MCHC RBC-ENTMCNC: 31.5 GM/DL
MCHC RBC-ENTMCNC: 32.9 PG
MCV RBC AUTO: 104.6 FL
MONOCYTES # BLD AUTO: 0.73 K/UL
MONOCYTES NFR BLD AUTO: 10 %
NEUTROPHILS # BLD AUTO: 4.08 K/UL
NEUTROPHILS NFR BLD AUTO: 56.1 %
NONHDLC SERPL-MCNC: 149 MG/DL
PLATELET # BLD AUTO: 464 K/UL
RBC # BLD: 3.92 M/UL
RBC # FLD: 12.8 %
TRIGL SERPL-MCNC: 83 MG/DL
WBC # FLD AUTO: 7.28 K/UL

## 2021-02-11 ENCOUNTER — APPOINTMENT (OUTPATIENT)
Dept: INTERNAL MEDICINE | Facility: CLINIC | Age: 66
End: 2021-02-11

## 2021-02-16 ENCOUNTER — APPOINTMENT (OUTPATIENT)
Dept: MRI IMAGING | Facility: IMAGING CENTER | Age: 66
End: 2021-02-16

## 2021-02-16 ENCOUNTER — OUTPATIENT (OUTPATIENT)
Dept: OUTPATIENT SERVICES | Facility: HOSPITAL | Age: 66
LOS: 1 days | End: 2021-02-16
Payer: MEDICARE

## 2021-02-16 DIAGNOSIS — D32.9 BENIGN NEOPLASM OF MENINGES, UNSPECIFIED: ICD-10-CM

## 2021-02-16 DIAGNOSIS — Z90.09 ACQUIRED ABSENCE OF OTHER PART OF HEAD AND NECK: Chronic | ICD-10-CM

## 2021-02-16 PROCEDURE — A9585: CPT

## 2021-02-16 PROCEDURE — 77263 THER RADIOLOGY TX PLNG CPLX: CPT

## 2021-02-16 PROCEDURE — 77334 RADIATION TREATMENT AID(S): CPT | Mod: 26

## 2021-02-16 PROCEDURE — 77290 THER RAD SIMULAJ FIELD CPLX: CPT | Mod: 26

## 2021-02-16 PROCEDURE — 76498 UNLISTED MR PROCEDURE: CPT

## 2021-02-17 ENCOUNTER — APPOINTMENT (OUTPATIENT)
Dept: NEUROSURGERY | Facility: AMBULATORY SURGERY CENTER | Age: 66
End: 2021-02-17
Payer: MEDICARE

## 2021-02-17 ENCOUNTER — APPOINTMENT (OUTPATIENT)
Dept: INTERNAL MEDICINE | Facility: CLINIC | Age: 66
End: 2021-02-17

## 2021-02-17 PROCEDURE — 77295 3-D RADIOTHERAPY PLAN: CPT | Mod: 26

## 2021-02-17 PROCEDURE — 77263 THER RADIOLOGY TX PLNG CPLX: CPT

## 2021-02-17 PROCEDURE — 77300 RADIATION THERAPY DOSE PLAN: CPT | Mod: 26

## 2021-02-17 PROCEDURE — 61798 SRS CRANIAL LESION COMPLEX: CPT

## 2021-02-18 ENCOUNTER — APPOINTMENT (OUTPATIENT)
Dept: INTERNAL MEDICINE | Facility: CLINIC | Age: 66
End: 2021-02-18

## 2021-02-25 DIAGNOSIS — D32.9 BENIGN NEOPLASM OF MENINGES, UNSPECIFIED: ICD-10-CM

## 2021-02-25 PROCEDURE — 77435 SBRT MANAGEMENT: CPT

## 2021-03-04 ENCOUNTER — NON-APPOINTMENT (OUTPATIENT)
Age: 66
End: 2021-03-04

## 2021-04-21 ENCOUNTER — APPOINTMENT (OUTPATIENT)
Dept: HEMATOLOGY ONCOLOGY | Facility: CLINIC | Age: 66
End: 2021-04-21
Payer: MEDICARE

## 2021-04-21 ENCOUNTER — NON-APPOINTMENT (OUTPATIENT)
Age: 66
End: 2021-04-21

## 2021-04-21 VITALS
HEIGHT: 63 IN | DIASTOLIC BLOOD PRESSURE: 80 MMHG | SYSTOLIC BLOOD PRESSURE: 124 MMHG | WEIGHT: 148 LBS | HEART RATE: 62 BPM | TEMPERATURE: 97.6 F | OXYGEN SATURATION: 96 % | BODY MASS INDEX: 26.22 KG/M2

## 2021-04-21 PROCEDURE — 99214 OFFICE O/P EST MOD 30 MIN: CPT | Mod: 25

## 2021-04-21 PROCEDURE — 36415 COLL VENOUS BLD VENIPUNCTURE: CPT

## 2021-04-21 NOTE — REASON FOR VISIT
[Follow-Up Visit] : a follow-up visit for [Blood Count Assessment] : blood count assessment [Thrombocytosis] : thrombocytosis

## 2021-04-22 ENCOUNTER — TRANSCRIPTION ENCOUNTER (OUTPATIENT)
Age: 66
End: 2021-04-22

## 2021-04-22 LAB
BASOPHILS # BLD AUTO: 0.05 K/UL
BASOPHILS NFR BLD AUTO: 0.8 %
EOSINOPHIL # BLD AUTO: 0.08 K/UL
EOSINOPHIL NFR BLD AUTO: 1.2 %
HCT VFR BLD CALC: 39.3 %
HGB BLD-MCNC: 12.4 G/DL
IMM GRANULOCYTES NFR BLD AUTO: 0.3 %
LYMPHOCYTES # BLD AUTO: 2.15 K/UL
LYMPHOCYTES NFR BLD AUTO: 33 %
MAN DIFF?: NORMAL
MCHC RBC-ENTMCNC: 31.6 GM/DL
MCHC RBC-ENTMCNC: 33 PG
MCV RBC AUTO: 104.5 FL
MONOCYTES # BLD AUTO: 0.65 K/UL
MONOCYTES NFR BLD AUTO: 10 %
NEUTROPHILS # BLD AUTO: 3.56 K/UL
NEUTROPHILS NFR BLD AUTO: 54.7 %
PLATELET # BLD AUTO: 454 K/UL
RBC # BLD: 3.76 M/UL
RBC # FLD: 12.7 %
WBC # FLD AUTO: 6.51 K/UL

## 2021-04-22 NOTE — ASSESSMENT
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 Patient thrombocytosis is controlled by Hydrea 500 mg daily, with at 1000 mg Monday, Wednesday, Friday.... She is also on anticoagulation with Eliquis which she tolerates very well... Patient is planning a trip to [place] in June...\par \par Plan is to have repeat blood work stable CBC.\par \par Follow-up with her PCP in 1 to 2 months... Dr. Rico ,Patient needs to see GYN and have colonoscopy...\par \par \par Follow-up here 4 to 6 months.

## 2021-04-22 NOTE — HISTORY OF PRESENT ILLNESS
[de-identified] : May 14, 2020 patient never had repeat blood work with PCP and platelet count was up to 591,000... She was taking Hydrea only at thousand milligram Monday Wednesday Friday...\par \par October 12, 2020 patient returns for follow-up... Main complaint is that she gained weight because she is not eating green vegetables salad because she is on Coumadin...\par \par April 21, 2020 here for follow-up... States "I feel fine, I even forgot them sick"...

## 2021-04-27 ENCOUNTER — TRANSCRIPTION ENCOUNTER (OUTPATIENT)
Age: 66
End: 2021-04-27

## 2021-04-29 ENCOUNTER — APPOINTMENT (OUTPATIENT)
Dept: RADIATION ONCOLOGY | Facility: CLINIC | Age: 66
End: 2021-04-29
Payer: MEDICARE

## 2021-04-29 VITALS
BODY MASS INDEX: 27.06 KG/M2 | DIASTOLIC BLOOD PRESSURE: 68 MMHG | RESPIRATION RATE: 17 BRPM | WEIGHT: 152.78 LBS | TEMPERATURE: 97.1 F | OXYGEN SATURATION: 95 % | HEART RATE: 65 BPM | SYSTOLIC BLOOD PRESSURE: 114 MMHG

## 2021-04-29 PROCEDURE — 99024 POSTOP FOLLOW-UP VISIT: CPT

## 2021-04-29 RX ORDER — DEXAMETHASONE 2 MG/1
2 TABLET ORAL
Qty: 15 | Refills: 0 | Status: DISCONTINUED | COMMUNITY
Start: 2021-02-24 | End: 2021-04-29

## 2021-04-29 RX ORDER — FAMOTIDINE 20 MG/1
20 TABLET, FILM COATED ORAL DAILY
Qty: 20 | Refills: 0 | Status: DISCONTINUED | COMMUNITY
Start: 2021-02-24 | End: 2021-04-29

## 2021-05-02 NOTE — HISTORY OF PRESENT ILLNESS
[FreeTextEntry1] : Ms. Pitts is a 65yo woman who presented for consideration of GKSRS for left cavernous sinus meningioma on 9/29/2020  She completed radiation therapy to her meningioma for a total of 2500 cgy 2/17-2/25/2021.\par \par RELEVANT MEDICAL HISTORY\par She has a past medical history thyroid cancer s/p thyroidectomy in 2006 followed by radiation.  She began to have RIGHT sided headaches at the end of August 2019 followed by nausea/vomiting. She saw her PCP and was treated for viral gastroenteritis. However, she continued to have persistent headaches and had MRI/MRA done in September 2019 which demonstrated a LEFT cavernous meningioma. \par \par She was seen by Dr. Tejeda on 9/26/19 and recommended evaluation for consideration of Gamma Knife Radiosurgery to meningioma. Additionally, the MRI did show possible narrowing of the LEFT intracavernous carotid narrowing. Due to this finding, Dr. Tejeda also recommended NOVA to evaluate for decreased blood flow due to possible LEFT carotid narrowing.Reviewed in tumor board on 9/30/19 and MRI demonstrated a large acute sagittal thrombosis. She was seen by Dr. Felder (neuro) and is now currently on lovenox bid. She is also following up with Dr. Martin (heme). Today she feels well. Notes headaches resolved after she started taking coumadin. Denies focal weakness, numbness, tingling, nausea, vomiting. Overall feeling well.  She still regularly feels some discomfort along the V2 distribution of the left side of her face.  \par \par 4/29/2021- Ms. Michelle presents today for follow up. TOday she is doing well. No longer has discomfort to V2 on the left. sometimes has some tearing to the left eye. overall has been feeling very well since treatment

## 2021-05-02 NOTE — REVIEW OF SYSTEMS
[Negative] : Psychiatric [Fever] : no fever [Chills] : no chills [Night Sweats] : no night sweats [Dysphagia] : no dysphagia [Loss of Hearing] : no loss of hearing [Chest Pain] : no chest pain [Palpitations] : no palpitations [Shortness Of Breath] : no shortness of breath [Cough] : no cough [Abdominal Pain] : no abdominal pain [Vomiting] : no vomiting [Confused] : no confusion [Constipation] : no constipation [Dizziness] : no dizziness [Fainting] : no fainting [Difficulty Walking] : no difficulty walking [de-identified] : denies headaches. no longer having discomfort to left V2 distribution

## 2021-05-17 ENCOUNTER — TRANSCRIPTION ENCOUNTER (OUTPATIENT)
Age: 66
End: 2021-05-17

## 2021-05-18 ENCOUNTER — TRANSCRIPTION ENCOUNTER (OUTPATIENT)
Age: 66
End: 2021-05-18

## 2021-05-21 ENCOUNTER — TRANSCRIPTION ENCOUNTER (OUTPATIENT)
Age: 66
End: 2021-05-21

## 2021-05-24 ENCOUNTER — TRANSCRIPTION ENCOUNTER (OUTPATIENT)
Age: 66
End: 2021-05-24

## 2021-06-24 ENCOUNTER — APPOINTMENT (OUTPATIENT)
Dept: INTERNAL MEDICINE | Facility: CLINIC | Age: 66
End: 2021-06-24
Payer: MEDICARE

## 2021-06-24 ENCOUNTER — LABORATORY RESULT (OUTPATIENT)
Age: 66
End: 2021-06-24

## 2021-06-24 VITALS
BODY MASS INDEX: 26.58 KG/M2 | DIASTOLIC BLOOD PRESSURE: 80 MMHG | OXYGEN SATURATION: 95 % | HEIGHT: 63 IN | TEMPERATURE: 97.7 F | RESPIRATION RATE: 12 BRPM | SYSTOLIC BLOOD PRESSURE: 115 MMHG | HEART RATE: 61 BPM | WEIGHT: 150 LBS

## 2021-06-24 PROCEDURE — 99214 OFFICE O/P EST MOD 30 MIN: CPT

## 2021-06-28 LAB
25(OH)D3 SERPL-MCNC: 52 NG/ML
ALBUMIN SERPL ELPH-MCNC: 4.3 G/DL
ALP BLD-CCNC: 62 U/L
ALT SERPL-CCNC: 19 U/L
ANION GAP SERPL CALC-SCNC: 13 MMOL/L
APPEARANCE: CLEAR
AST SERPL-CCNC: 19 U/L
BASOPHILS # BLD AUTO: 0.04 K/UL
BASOPHILS NFR BLD AUTO: 0.8 %
BILIRUB SERPL-MCNC: 0.3 MG/DL
BILIRUBIN URINE: NEGATIVE
BLOOD URINE: NEGATIVE
BUN SERPL-MCNC: 22 MG/DL
CALCIUM SERPL-MCNC: 9.3 MG/DL
CHLORIDE SERPL-SCNC: 103 MMOL/L
CO2 SERPL-SCNC: 23 MMOL/L
COLOR: NORMAL
CREAT SERPL-MCNC: 0.89 MG/DL
EOSINOPHIL # BLD AUTO: 0.1 K/UL
EOSINOPHIL NFR BLD AUTO: 1.9 %
ESTIMATED AVERAGE GLUCOSE: 94 MG/DL
FERRITIN SERPL-MCNC: 98 NG/ML
FOLATE SERPL-MCNC: 15.1 NG/ML
GLUCOSE QUALITATIVE U: NEGATIVE
GLUCOSE SERPL-MCNC: 80 MG/DL
GLUCOSE SERPL-MCNC: 90 MG/DL
HBA1C MFR BLD HPLC: 4.9 %
HCT VFR BLD CALC: 42.3 %
HGB BLD-MCNC: 12.6 G/DL
IMM GRANULOCYTES NFR BLD AUTO: 0.4 %
IRON SERPL-MCNC: 80 UG/DL
KETONES URINE: NEGATIVE
LEUKOCYTE ESTERASE URINE: NEGATIVE
LYMPHOCYTES # BLD AUTO: 1.58 K/UL
LYMPHOCYTES NFR BLD AUTO: 30.7 %
MAN DIFF?: NORMAL
MCHC RBC-ENTMCNC: 29.8 GM/DL
MCHC RBC-ENTMCNC: 32.5 PG
MCV RBC AUTO: 109 FL
MONOCYTES # BLD AUTO: 0.59 K/UL
MONOCYTES NFR BLD AUTO: 11.5 %
NEUTROPHILS # BLD AUTO: 2.82 K/UL
NEUTROPHILS NFR BLD AUTO: 54.7 %
NITRITE URINE: NEGATIVE
PH URINE: 6
PLATELET # BLD AUTO: 482 K/UL
POTASSIUM SERPL-SCNC: 4.5 MMOL/L
PROT SERPL-MCNC: 7.1 G/DL
PROTEIN URINE: NEGATIVE
RBC # BLD: 3.88 M/UL
RBC # FLD: 12.9 %
SODIUM SERPL-SCNC: 139 MMOL/L
SPECIFIC GRAVITY URINE: 1.01
TSH SERPL-ACNC: 0.02 UIU/ML
UROBILINOGEN URINE: NORMAL
VIT B12 SERPL-MCNC: 751 PG/ML
WBC # FLD AUTO: 5.15 K/UL

## 2021-07-16 LAB
CHOLEST SERPL-MCNC: 194 MG/DL
HDLC SERPL-MCNC: 55 MG/DL
LDLC SERPL CALC-MCNC: 126 MG/DL
NONHDLC SERPL-MCNC: 139 MG/DL
TRIGL SERPL-MCNC: 65 MG/DL

## 2021-09-03 NOTE — ED ADULT TRIAGE NOTE - PATIENT ON (OXYGEN DELIVERY METHOD)
[facility]  Progress Note - Sarina Ty 1962, 60 y o  female MRN: [id number]  Unit/Bed#: 7T [facility] 709-01 Encounter: [id number]  Primary Care Provider: No primary care provider on file  Date and time admitted to hospital: 9/2/2021  1:44 PM    Class 2 severe obesity due to excess calories with serious comorbidity and body mass index (BMI) of 39 0 to 39 9 in adult [facility])  Assessment & Plan  -encourage weight loss  -affects all aspects of care    Type 2 diabetes mellitus with chronic kidney disease, without long-term current use of insulin [facility])  Assessment & Plan  Lab Results   Component Value Date    HGBA1C 8 5 (H) 09/02/2021       Recent Labs     09/02/21  1657 09/02/21 2023 09/03/21  0539   POCGLU 209* 224* 211*       Blood Sugar Average: Last 72 hrs:  (P) 213 [id number]   -cont with SSI while in the hospital  -resume home antihyperglycemic agents on d/c  -poorly controlled, will need to discuss medication changes with PCP    Essential hypertension  Assessment & Plan  -cont Norvasc/Cozaar    Stage 3a chronic kidney disease ([facility] [address] )  Assessment & Plan  Lab Results   Component Value Date    EGFR 53 (L) 09/02/2021    EGFR 57 07/25/2018    EGFR >60 0 08/24/2016    CREATININE 1 28 (H) 09/02/2021    CREATININE 1 23 07/25/2018    CREATININE 0 99 08/24/2016     -baseline Cr 1 2    * Chest pain  Assessment & Plan  -likely non-cardiac  -ECG (read by me): NSR 93, nl axis/intervals, no acute ST/TW changes  -Troponin NEG x 4  -telemetry, reviewed by me, sinus rhythm  -c/s cards      VTE Pharmacologic Prophylaxis:  Ambulation    Patient Centered Rounds: I have performed bedside rounds with nursing staff today  Time Spent for Care: 20 minutes  More than 50% of total time spent on counseling and coordination of care as described above      Current Length of Stay: 0 day(s)    Current Patient Status: Observation   Certification Statement: The patient will continue to require additional inpatient hospital stay due to Chest pain pending cardiology consult    Discharge Plan: Today    Code Status: Level 1 - Full Code      Subjective:   Patient reports persistent retrosternal sharp chest pain that radiates to her right chest   Today she mentions it is pleuritic and that the pain is worse when she presses on her sternum  Objective:     Vitals:   Temp (24hrs), Av 8 °F (36 °C), Min:96 3 °F (35 7 °C), Max:97 5 °F (36 4 °C)    Temp:  [96 3 °F (35 7 °C)-97 5 °F (36 4 °C)] 97 5 °F (36 4 °C)  HR:  [] 86  Resp:  [16-18] 18  BP: (125-182)/(67-96) 148/92  SpO2:  [94 %-97 %] 95 %  Body mass index is 39 18 kg/m²  Input and Output Summary (last 24 hours):        Intake/Output Summary (Last 24 hours) at 9/3/2021 0848  Last data filed at 9/3/2021 0540  Gross per 24 hour   Intake 840 ml   Output --   Net 840 ml       Physical Exam:     Physical Exam  Gen: NAD, AAOx3, well developed, well nourished  Eyes: EOMI, PERRLA, no scleral icterus  ENMT:  no nasal discharge, no otic discharge, moist mucous membranes  Neck:  Supple  Cardiovascular:  Remains  Regular rate and rhythm, normal S1-S2, no murmurs, rubs, or gallops  Lungs:  Remains  Clear to auscultation bilaterally, no wheezes, or rales, or rhonchi  Abdomen:  Remains  Positive bowel sounds, soft, nontender, nondistended, no palpable organomegaly   Skin:  Intact, no obvious lesions or rashes, no edema  Neuro: Cranial nerves 2-12 are intact, non-focal, 5/5 strength in all 4 extremities  Additional Data:     Labs:    Results from last 7 days   Lab Units 21  1410   WBC Thousand/uL 7 60   HEMOGLOBIN g/dL 12 5   HEMATOCRIT % 38 0   PLATELETS Thousands/uL 448   NEUTROS PCT % 59   LYMPHS PCT % 30   MONOS PCT % 8   EOS PCT % 2     Results from last 7 days   Lab Units 21  1410   SODIUM mmol/L 137   POTASSIUM mmol/L 4 7   CHLORIDE mmol/L 100   CO2 mmol/L 27   BUN mg/dL 15   CREATININE mg/dL 1 28*   ANION GAP mmol/L 10   CALCIUM mg/dL 9 7   GLUCOSE RANDOM mg/dL 307*         Results from last 7 days   Lab Units 09/03/21  0539 09/02/21 2023 09/02/21  1657   POC GLUCOSE mg/dl 211* 224* 209*     Results from last 7 days   Lab Units 09/02/21  1410   HEMOGLOBIN A1C % 8 5*               * I Have Reviewed All Lab Data Listed Above  * Additional Pertinent Lab Tests Reviewed: [address] 65 Admission Reviewed     Recent Cultures (last 7 days):           Last 24 Hours Medication List:   Current Facility-Administered Medications   Medication Dose Route Frequency Provider Last Rate    acetaminophen  650 mg Oral Q6H PRN Maria M Matthews MD      amLODIPine  10 mg Oral Daily Maria M Matthews MD      insulin lispro  2-12 Units Subcutaneous TID [facility] [place] Maria M Matthews MD      losartan  100 mg Oral Daily Maria M Matthews MD      ondansetron  4 mg Intravenous Q4H PRN Vinay Maloney PA-C      sodium chloride (PF)  3 mL Intravenous Q1H PRN Maria M Matthews MD          Today, Patient Was Seen By: Maria M Matthews MD    ** Please Note: Dictation voice to text software may have been used in the creation of this document   ** room air

## 2021-10-01 ENCOUNTER — APPOINTMENT (OUTPATIENT)
Dept: MRI IMAGING | Facility: IMAGING CENTER | Age: 66
End: 2021-10-01
Payer: MEDICARE

## 2021-10-01 ENCOUNTER — OUTPATIENT (OUTPATIENT)
Dept: OUTPATIENT SERVICES | Facility: HOSPITAL | Age: 66
LOS: 1 days | End: 2021-10-01
Payer: MEDICARE

## 2021-10-01 DIAGNOSIS — Z90.09 ACQUIRED ABSENCE OF OTHER PART OF HEAD AND NECK: Chronic | ICD-10-CM

## 2021-10-01 DIAGNOSIS — D32.9 BENIGN NEOPLASM OF MENINGES, UNSPECIFIED: ICD-10-CM

## 2021-10-01 PROCEDURE — 70553 MRI BRAIN STEM W/O & W/DYE: CPT | Mod: 26,MH

## 2021-10-01 PROCEDURE — A9585: CPT

## 2021-10-01 PROCEDURE — 70553 MRI BRAIN STEM W/O & W/DYE: CPT

## 2021-10-05 ENCOUNTER — APPOINTMENT (OUTPATIENT)
Dept: RADIATION ONCOLOGY | Facility: CLINIC | Age: 66
End: 2021-10-05
Payer: MEDICARE

## 2021-10-05 VITALS
OXYGEN SATURATION: 97 % | BODY MASS INDEX: 26.57 KG/M2 | TEMPERATURE: 96.8 F | SYSTOLIC BLOOD PRESSURE: 127 MMHG | HEART RATE: 59 BPM | RESPIRATION RATE: 12 BRPM | WEIGHT: 150 LBS | DIASTOLIC BLOOD PRESSURE: 84 MMHG

## 2021-10-05 PROCEDURE — 99213 OFFICE O/P EST LOW 20 MIN: CPT

## 2021-10-06 NOTE — HISTORY OF PRESENT ILLNESS
[FreeTextEntry1] : Ms. Pitts presented for consideration of GKSRS for left cavernous sinus meningioma on 9/29/2020  She completed radiation therapy to her meningioma for a total of 2500 cgy 2/17-2/25/2021.\par \par RELEVANT MEDICAL HISTORY\par She has a past medical history thyroid cancer s/p thyroidectomy in 2006 followed by radiation.  She began to have RIGHT sided headaches at the end of August 2019 followed by nausea/vomiting. She saw her PCP and was treated for viral gastroenteritis. However, she continued to have persistent headaches and had MRI/MRA done in September 2019 which demonstrated a LEFT cavernous meningioma. \par \par She was seen by Dr. Tejeda on 9/26/19 and recommended evaluation for consideration of Gamma Knife Radiosurgery to meningioma. Additionally, the MRI did show possible narrowing of the LEFT intracavernous carotid narrowing. Due to this finding, Dr. Tejeda also recommended NOVA to evaluate for decreased blood flow due to possible LEFT carotid narrowing.Reviewed in tumor board on 9/30/19 and MRI demonstrated a large acute sagittal thrombosis. She was seen by Dr. Felder (neuro) and is now currently on lovenox bid. She is also following up with Dr. Martin (heme). Today she feels well. Notes headaches resolved after she started taking coumadin. Denies focal weakness, numbness, tingling, nausea, vomiting. Overall feeling well.  She still regularly feels some discomfort along the V2 distribution of the left side of her face.  \par \par 4/29/2021- Ms. Michelle presents today for follow up. TOday she is doing well. No longer has discomfort to V2 on the left. sometimes has some tearing to the left eye. overall has been feeling very well since treatment\par \par 10/5/2021- Ms. Michelle presents today for follow up. She underwent a brain MRI on 10/2/2021. There is no final read on this scan however her meningioma appears smaller. No V2 pain. no headaches, no visual trouble, no balance trouble, no nausea or vomiting. notes some pain and numbness from her left shoulder to her left fingertips, starting this summer

## 2021-10-06 NOTE — REVIEW OF SYSTEMS
[Negative] : Psychiatric [Fever] : no fever [Chills] : no chills [Night Sweats] : no night sweats [Dysphagia] : no dysphagia [Loss of Hearing] : no loss of hearing [Chest Pain] : no chest pain [Palpitations] : no palpitations [Shortness Of Breath] : no shortness of breath [Cough] : no cough [Abdominal Pain] : no abdominal pain [Vomiting] : no vomiting [Constipation] : no constipation [Confused] : no confusion [Dizziness] : no dizziness [Fainting] : no fainting [Difficulty Walking] : no difficulty walking [de-identified] : denies headaches. no longer having discomfort to left V2 distribution.Notes numbness and pain to left arm from shoulder to hand

## 2022-02-03 ENCOUNTER — APPOINTMENT (OUTPATIENT)
Dept: HEMATOLOGY ONCOLOGY | Facility: CLINIC | Age: 67
End: 2022-02-03
Payer: MEDICARE

## 2022-02-03 ENCOUNTER — LABORATORY RESULT (OUTPATIENT)
Age: 67
End: 2022-02-03

## 2022-02-03 PROCEDURE — 99214 OFFICE O/P EST MOD 30 MIN: CPT | Mod: 95

## 2022-02-04 ENCOUNTER — TRANSCRIPTION ENCOUNTER (OUTPATIENT)
Age: 67
End: 2022-02-04

## 2022-02-04 LAB
25(OH)D3 SERPL-MCNC: 46.7 NG/ML
ALBUMIN SERPL ELPH-MCNC: 4.4 G/DL
ALP BLD-CCNC: 72 U/L
ALT SERPL-CCNC: 25 U/L
ANION GAP SERPL CALC-SCNC: 14 MMOL/L
AST SERPL-CCNC: 19 U/L
BASOPHILS # BLD AUTO: 0.05 K/UL
BASOPHILS NFR BLD AUTO: 0.8 %
BILIRUB SERPL-MCNC: <0.2 MG/DL
BUN SERPL-MCNC: 21 MG/DL
CALCIUM SERPL-MCNC: 9 MG/DL
CHLORIDE SERPL-SCNC: 103 MMOL/L
CO2 SERPL-SCNC: 24 MMOL/L
CREAT SERPL-MCNC: 0.88 MG/DL
EOSINOPHIL # BLD AUTO: 0.09 K/UL
EOSINOPHIL NFR BLD AUTO: 1.4 %
ERYTHROCYTE [SEDIMENTATION RATE] IN BLOOD BY WESTERGREN METHOD: 32 MM/HR
GLUCOSE SERPL-MCNC: 87 MG/DL
HCT VFR BLD CALC: 38.4 %
HGB BLD-MCNC: 12 G/DL
IMM GRANULOCYTES NFR BLD AUTO: 0.3 %
LDH SERPL-CCNC: 166 U/L
LYMPHOCYTES # BLD AUTO: 2.37 K/UL
LYMPHOCYTES NFR BLD AUTO: 37.1 %
MAN DIFF?: NORMAL
MCHC RBC-ENTMCNC: 31.3 GM/DL
MCHC RBC-ENTMCNC: 32.8 PG
MCV RBC AUTO: 104.9 FL
MONOCYTES # BLD AUTO: 0.79 K/UL
MONOCYTES NFR BLD AUTO: 12.4 %
NEUTROPHILS # BLD AUTO: 3.06 K/UL
NEUTROPHILS NFR BLD AUTO: 48 %
PLATELET # BLD AUTO: 481 K/UL
POTASSIUM SERPL-SCNC: 4.6 MMOL/L
PROT SERPL-MCNC: 6.8 G/DL
RBC # BLD: 3.66 M/UL
RBC # FLD: 12.7 %
SODIUM SERPL-SCNC: 142 MMOL/L
TSH SERPL-ACNC: 0.01 UIU/ML
VIT B12 SERPL-MCNC: 807 PG/ML
WBC # FLD AUTO: 6.38 K/UL

## 2022-02-04 NOTE — HISTORY OF PRESENT ILLNESS
[Home] : at home, [unfilled] , at the time of the visit. [Medical Office: ([facility])___] : at the medical office located in  [Verbal consent obtained from patient] : the patient, [unfilled] [de-identified] : May 14, 2020 patient never had repeat blood work with PCP and platelet count was up to 591,000... She was taking Hydrea only at thousand milligram Monday Wednesday Friday...\par \par October 12, 2020 patient returns for follow-up... Main complaint is that she gained weight because she is not eating green vegetables salad because she is on Coumadin...\par \par April 21, 2021 here for follow-up... States "I feel fine, I even forgot I am sick"...\par \par February 3, 2022 patient needs refill on her Hydrea... Doing well no complaints... Last CBC over 6 months ago\par \par

## 2022-02-04 NOTE — ASSESSMENT
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 Patient thrombocytosis is controlled by Hydrea 500 mg daily, with at 1000 mg Monday, Wednesday, Friday.... She is also on anticoagulation with Eliquis which she tolerates very well... \par \par Patient returned from [place] but forgot to come for follow-up...\par \par I reviewed patient's recent blood work... Platelet count controlled, patient to continue same Hydrea... Prescription renewed... Of note patient TSH is low with T4 is high, and she needs adjustment of her thyroid medications... I informed patient of all this and I referred her back to her PCP or her endocrinologist.\par \par Follow-up with me 3 to 4 months, or sooner if needed\par \par

## 2022-02-06 ENCOUNTER — TRANSCRIPTION ENCOUNTER (OUTPATIENT)
Age: 67
End: 2022-02-06

## 2022-02-09 ENCOUNTER — TRANSCRIPTION ENCOUNTER (OUTPATIENT)
Age: 67
End: 2022-02-09

## 2022-02-17 ENCOUNTER — APPOINTMENT (OUTPATIENT)
Dept: INTERNAL MEDICINE | Facility: CLINIC | Age: 67
End: 2022-02-17
Payer: MEDICARE

## 2022-02-17 ENCOUNTER — NON-APPOINTMENT (OUTPATIENT)
Age: 67
End: 2022-02-17

## 2022-02-17 VITALS
RESPIRATION RATE: 12 BRPM | HEART RATE: 88 BPM | DIASTOLIC BLOOD PRESSURE: 76 MMHG | WEIGHT: 151 LBS | BODY MASS INDEX: 26.75 KG/M2 | HEIGHT: 63 IN | SYSTOLIC BLOOD PRESSURE: 114 MMHG | OXYGEN SATURATION: 97 %

## 2022-02-17 PROCEDURE — 93000 ELECTROCARDIOGRAM COMPLETE: CPT

## 2022-02-17 PROCEDURE — G0439: CPT

## 2022-02-17 NOTE — ASSESSMENT
[FreeTextEntry1] : CPE OG 66 Y OLD FEM WITH PMX OF THYROID CA ON SUPPRESSIVE RX,ET ON ELIQUIS= LABS SEEN \par EKG TODAY \par F/U ENDO \par RECOMM COLONOSCOPY \par RTO 4 M

## 2022-02-17 NOTE — PHYSICAL EXAM
[No Acute Distress] : no acute distress [Well Nourished] : well nourished [Well Developed] : well developed [Well-Appearing] : well-appearing [Normal Sclera/Conjunctiva] : normal sclera/conjunctiva [PERRL] : pupils equal round and reactive to light [EOMI] : extraocular movements intact [No JVD] : no jugular venous distention [No Lymphadenopathy] : no lymphadenopathy [Supple] : supple [Thyroid Normal, No Nodules] : the thyroid was normal and there were no nodules present [No Respiratory Distress] : no respiratory distress  [No Accessory Muscle Use] : no accessory muscle use [Clear to Auscultation] : lungs were clear to auscultation bilaterally [Normal Rate] : normal rate  [Regular Rhythm] : with a regular rhythm [Normal S1, S2] : normal S1 and S2 [No Murmur] : no murmur heard [No Carotid Bruits] : no carotid bruits [No Abdominal Bruit] : a ~M bruit was not heard ~T in the abdomen [No Varicosities] : no varicosities [Pedal Pulses Present] : the pedal pulses are present [No Edema] : there was no peripheral edema [No Palpable Aorta] : no palpable aorta [No Extremity Clubbing/Cyanosis] : no extremity clubbing/cyanosis [Soft] : abdomen soft [Non Tender] : non-tender [Non-distended] : non-distended [No Masses] : no abdominal mass palpated [No HSM] : no HSM [Normal Bowel Sounds] : normal bowel sounds [Normal Posterior Cervical Nodes] : no posterior cervical lymphadenopathy [Normal Anterior Cervical Nodes] : no anterior cervical lymphadenopathy [No CVA Tenderness] : no CVA  tenderness [No Joint Swelling] : no joint swelling [No Rash] : no rash [Coordination Grossly Intact] : coordination grossly intact [No Focal Deficits] : no focal deficits [Normal Affect] : the affect was normal [Normal Insight/Judgement] : insight and judgment were intact

## 2022-02-17 NOTE — HISTORY OF PRESENT ILLNESS
[de-identified] : COMES FOR CPE \par SEEN BY HEMATOLOGY\par WILL SEE ENDO ,ON SUPPRESSIVE RX FOR THYROID CA HX \par NEVER HAD COLONOSCOPY \par LAST COVID-19 VACCINE 3/21

## 2022-05-18 ENCOUNTER — APPOINTMENT (OUTPATIENT)
Dept: NEUROLOGY | Facility: CLINIC | Age: 67
End: 2022-05-18

## 2022-05-18 VITALS
TEMPERATURE: 98.1 F | BODY MASS INDEX: 26.93 KG/M2 | WEIGHT: 152 LBS | SYSTOLIC BLOOD PRESSURE: 113 MMHG | DIASTOLIC BLOOD PRESSURE: 76 MMHG | HEIGHT: 63 IN | OXYGEN SATURATION: 96 % | HEART RATE: 76 BPM

## 2022-05-18 PROCEDURE — 99215 OFFICE O/P EST HI 40 MIN: CPT

## 2022-05-23 NOTE — PHYSICAL EXAM
[FreeTextEntry1] : General: sitting on exam table comfortably, smiling, NAD\par CV: RRR\par Extremities: FROMx4, 2+ radial pulses b/l\par \par Neurological Exam:\par Mental Status: AA&O x3, fluent, no dysarthria, follows all commands, able to tell full history, attention intact\par Cranial nerves: EOMI, facial sensation intact, no facial droop, tongue midline\par Motor: No pronator drift, 5/5 x4, normal bulk and tone\par Sensory: Intact light touch bilaterally\par Reflexes: UE 2+, LE  2+\par Cerebellar: FNF intact b/l\par Gait: steady\par

## 2022-05-23 NOTE — ASSESSMENT
[FreeTextEntry1] : 64 year old right handed female patient w/pmh of left cavernous sinus meningioma presents forfollow up visit. \par \par Plan\par -MRV head; repeat. If negative for clot no further visits needed. If positive for clot will follow up in one year. 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

## 2022-05-23 NOTE — HISTORY OF PRESENT ILLNESS
[FreeTextEntry1] : 64 year old right handed female patient w/pmh of left cavernous sinus meningioma presents for 6 month follow up visit. \par \par \par No more headaches. She had an MRI of her meningioma done but not an MRV. \par has been very stressed due to family situation with her grandson so has not been so focused on her own health. \par \par Denies neurological deficits as of today- dizziness, nausea/vomiting, numbness/tingling, changes in vision/speech since the last visit. \par \par \par \par Got vaccine x 2 doses and then boosted x 2.

## 2022-05-29 ENCOUNTER — OUTPATIENT (OUTPATIENT)
Dept: OUTPATIENT SERVICES | Facility: HOSPITAL | Age: 67
LOS: 1 days | End: 2022-05-29
Payer: MEDICARE

## 2022-05-29 ENCOUNTER — APPOINTMENT (OUTPATIENT)
Dept: MRI IMAGING | Facility: HOSPITAL | Age: 67
End: 2022-05-29

## 2022-05-29 DIAGNOSIS — Z90.09 ACQUIRED ABSENCE OF OTHER PART OF HEAD AND NECK: Chronic | ICD-10-CM

## 2022-05-29 PROCEDURE — G1004: CPT

## 2022-05-29 PROCEDURE — 70544 MR ANGIOGRAPHY HEAD W/O DYE: CPT | Mod: 26,ME

## 2022-05-29 PROCEDURE — 70544 MR ANGIOGRAPHY HEAD W/O DYE: CPT | Mod: ME

## 2022-06-28 ENCOUNTER — NON-APPOINTMENT (OUTPATIENT)
Age: 67
End: 2022-06-28

## 2022-07-03 ENCOUNTER — OUTPATIENT (OUTPATIENT)
Dept: OUTPATIENT SERVICES | Facility: HOSPITAL | Age: 67
LOS: 1 days | End: 2022-07-03
Payer: MEDICARE

## 2022-07-03 ENCOUNTER — APPOINTMENT (OUTPATIENT)
Dept: MRI IMAGING | Facility: HOSPITAL | Age: 67
End: 2022-07-03

## 2022-07-03 DIAGNOSIS — Z90.09 ACQUIRED ABSENCE OF OTHER PART OF HEAD AND NECK: Chronic | ICD-10-CM

## 2022-07-03 PROCEDURE — 70546 MR ANGIOGRAPH HEAD W/O&W/DYE: CPT

## 2022-07-03 PROCEDURE — A9585: CPT

## 2022-07-03 PROCEDURE — 70546 MR ANGIOGRAPH HEAD W/O&W/DYE: CPT | Mod: 26,MH

## 2022-08-11 ENCOUNTER — APPOINTMENT (OUTPATIENT)
Dept: HEMATOLOGY ONCOLOGY | Facility: CLINIC | Age: 67
End: 2022-08-11

## 2022-08-11 ENCOUNTER — OUTPATIENT (OUTPATIENT)
Dept: OUTPATIENT SERVICES | Facility: HOSPITAL | Age: 67
LOS: 1 days | Discharge: HOME | End: 2022-08-11

## 2022-08-11 DIAGNOSIS — Z90.09 ACQUIRED ABSENCE OF OTHER PART OF HEAD AND NECK: Chronic | ICD-10-CM

## 2022-08-11 DIAGNOSIS — Z15.89 GENETIC SUSCEPTIBILITY TO OTHER DISEASE: ICD-10-CM

## 2022-08-11 PROCEDURE — 99214 OFFICE O/P EST MOD 30 MIN: CPT | Mod: 95

## 2022-08-11 NOTE — HISTORY OF PRESENT ILLNESS
[Home] : at home, [unfilled] , at the time of the visit. [Medical Office: ([facility])___] : at the medical office located in  [Verbal consent obtained from patient] : the patient, [unfilled] [de-identified] : May 14, 2020 patient never had repeat blood work with PCP and platelet count was up to 591,000... She was taking Hydrea only at thousand milligram Monday Wednesday Friday...\par \par October 12, 2020 patient returns for follow-up... Main complaint is that she gained weight because she is not eating green vegetables salad because she is on Coumadin...\par \par April 21, 2021 here for follow-up... States "I feel fine, I even forgot I am sick"...\par \par February 3, 2022 patient needs refill on her Hydrea... Doing well no complaints... Last CBC over 6 months ago\par \par August 11, 2022 patient requested follow-up appointment since she suffers from excessive heat, and is sweating a lot this summer... She states she never used to sweat in the summer...\par \par

## 2022-08-11 NOTE — ASSESSMENT
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 Patient thrombocytosis is controlled by Hydrea 500 mg daily, with at 1000 mg Monday, Wednesday, Friday.... She is also on anticoagulation with Eliquis which she tolerates very well... \par \par Patient last blood work in our system is from February 2022, 6 months ago.\par \par I placed orders for annual blood work, for patient to do at her local lab.\par \par I suspect patient's overheating is secondary to her hyperactive thyroid... Reached out to her PCP to review her blood work, adjust her Synthroid, referred her to endocrinology for adjustment of her Synthroid.\par \par Follow-up here in 3 months or sooner if needed.

## 2022-08-12 ENCOUNTER — LABORATORY RESULT (OUTPATIENT)
Age: 67
End: 2022-08-12

## 2022-08-16 ENCOUNTER — APPOINTMENT (OUTPATIENT)
Dept: HEMATOLOGY ONCOLOGY | Facility: CLINIC | Age: 67
End: 2022-08-16

## 2022-08-22 NOTE — DISCUSSION/SUMMARY
[FreeTextEntry1] : The visit was provided via telehealth using real-time 2-way audio visual technology. The patient, Jessica Michelle, was located at home in [place], [place] at the time of the visit. The provider, Leny Christiansen, was located at the medical office located in [place], [place] at the time of the visit. The patient, Jessica Michelle, and Provider, Leny Christiansen, participated in the telehealth encounter. Consent for telehealth services was given on 2022 by the patient, Jessica Michelle.\par \par REASON FOR CONSULT\par Jessica Michelle is a 66-year-old female who was referred by Lyudmila Bertrand NP, for cancer genetic counseling and risk assessment due to her family history of pancreatic cancer.\par \par RELEVANT MEDICAL HISTORY\par Ms. Michelle was diagnosed with papillary thyroid cancer in  at age 50. She was treated with thyroidectomy and radiation.\par \par Ms. Michelle also has a family history of cancer, see below.\par \par OTHER MEDICAL AND SURGICAL HISTORY:\par Thrombocytosis, JAK2 POSITIVE, headaches, meningioma. Total thyroidectomy, trigger finger repair, tubal ligation, surgery for a vein bleed in chest area.\par \par PAST OB/GYN HISTORY:\par Obstetrical History: \par Age at Menarche: 11\par Menopausal with LMP at age 39\par Age at First Live Birth: 21\par Oral Contraceptive Use: Yes, less than 5 years total\par Hormone Replacement Therapy: No\par \par CANCER SCREENING HISTORY:  \par Breast: Most recent mammogram: 2020, results: [id number] – benign, Frequency: every 2 years; Most recent sonogram: 2020, results: normal per patient, Frequency: as needed\par GYN: Most recent visit: 40 years ago, Ms. Michelle reported her previous pap smears were normal \par Colon: No previous colonoscopies\par Skin:  No previous skin exams\par \par SOCIAL HISTORY:\par •	\par •	Tobacco-product use: Yes, former smoker for 40 years total, 1 pack every 2 days. Quit three years ago.\par •	Second-hand smoke exposure: No\par \par FAMILY HISTORY:\par Maternal ancestry was reported as [place]ian and paternal ancestry was reported as [place]ian. No Ashkenazi Zoroastrian heritage reported. A detailed family history of cancer was ascertained, see below and scanned chart for pedigree. \par \par To Ms. Michelle’s knowledge, no one in the family has had germline testing for cancer susceptibility.  \par 	\par 	RISK ASSESSMENT:\par Ms. Michelle’s personal history of papillary thyroid cancer and family history of pancreatic, gastric, ovarian, and thyroid cancer is suggestive of more than one hereditary cancer predisposition syndrome. We recommended genetic testing for the Pancreatic Cancer Panel, the Thyroid Cancer Panel, the Breast and Gyn Cancers Guidelines-Based Panel, and the Gastric Cancer Panel. This test analyzes 37 genes: APC, TYRON, BARD1, BMPR1A, BRCA1, BRCA2, BRIP1, CDH1, CDKN2A, CHEK2, CTNNA1, DICER1, EPCAM, KIT, MEN1, MLH1, MSH2, MSH6, NF1, PALB2, PDGFRA, PMS2, TSNCA9T, PTEN, RAD51C, RAD51D, RET, SDHA, SDHB, SDHC, SDHD, SMAD4, STK11, TP53, TSC1, TSC2, VHL\par \par We discussed the risks, benefits and limitations, and implications of genetic testing. We reviewed that Ms. Michelle does not meet Medicare criteria for testing, and she understood she will have to pay the self-pay option of $250 if genetic testing is pursued. We also discussed the psychosocial implications of genetic testing. Possible test results were reviewed with Ms. Michelle, along with associated medical management options. The Genetic Information Non-discrimination Act ([place]ES[place]) was also reviewed.\par \par Ms. Michelle verbally consented to the above-mentioned genetic testing panel. [facility] informed consent form was emailed to the patient. A saliva sample kit was mailed to the patient today. Once the sample has been collected and sent out, Ms. Michelle will inform us. \par \par PLAN:\par \par 1.	Saliva kit was sent to Ms. Michelle’s home for collection. Once collected and dropped off, patient will inform us. \par 2.	Ms. Michelle was sent a copy of the [facility] consent and medical release form via email to be filled, signed, and returned to us.\par 3.	We will contact Ms. Michelle to schedule a follow-up appointment once the results are available. Results generally return in 2-3 weeks from the day the sample kit is mailed.\par \par \par For any additional questions please call [facility] at [phone]. \par \par \par Leny Christiansen, MS\par Genetic Counselor, [facility]\par \par \par CC: \par Patient\par Lyudmila Bertrand NP

## 2022-08-29 LAB
ALBUMIN SERPL ELPH-MCNC: 4.4 G/DL
ALP BLD-CCNC: 86 U/L
ALT SERPL-CCNC: 25 U/L
ANION GAP SERPL CALC-SCNC: 13 MMOL/L
AST SERPL-CCNC: 20 U/L
BASOPHILS # BLD AUTO: 0.05 K/UL
BASOPHILS NFR BLD AUTO: 0.7 %
BILIRUB SERPL-MCNC: 0.2 MG/DL
BUN SERPL-MCNC: 20 MG/DL
CALCIUM SERPL-MCNC: 9.4 MG/DL
CHLORIDE SERPL-SCNC: 104 MMOL/L
CO2 SERPL-SCNC: 25 MMOL/L
CREAT SERPL-MCNC: 0.94 MG/DL
EGFR: 67 ML/MIN/1.73M2
EOSINOPHIL # BLD AUTO: 0.11 K/UL
EOSINOPHIL NFR BLD AUTO: 1.5 %
ERYTHROCYTE [SEDIMENTATION RATE] IN BLOOD BY WESTERGREN METHOD: 34 MM/HR
GLUCOSE SERPL-MCNC: 97 MG/DL
HCT VFR BLD CALC: 40.8 %
HGB BLD-MCNC: 12.7 G/DL
IMM GRANULOCYTES NFR BLD AUTO: 0.4 %
LDH SERPL-CCNC: 196 U/L
LYMPHOCYTES # BLD AUTO: 2.02 K/UL
LYMPHOCYTES NFR BLD AUTO: 27.3 %
MAN DIFF?: NORMAL
MCHC RBC-ENTMCNC: 31.1 GM/DL
MCHC RBC-ENTMCNC: 32.2 PG
MCV RBC AUTO: 103.6 FL
MONOCYTES # BLD AUTO: 0.7 K/UL
MONOCYTES NFR BLD AUTO: 9.4 %
NEUTROPHILS # BLD AUTO: 4.5 K/UL
NEUTROPHILS NFR BLD AUTO: 60.7 %
PLATELET # BLD AUTO: 557 K/UL
POTASSIUM SERPL-SCNC: 5.1 MMOL/L
PROT SERPL-MCNC: 6.8 G/DL
RBC # BLD: 3.94 M/UL
RBC # FLD: 13 %
SODIUM SERPL-SCNC: 141 MMOL/L
TSH SERPL-ACNC: 0.03 UIU/ML
WBC # FLD AUTO: 7.41 K/UL

## 2022-08-30 ENCOUNTER — NON-APPOINTMENT (OUTPATIENT)
Age: 67
End: 2022-08-30

## 2022-08-30 NOTE — DISCUSSION/SUMMARY
[FreeTextEntry1] : Ms. Michelle was called on 8/30/2022 and informed that there was a sample failure when the laboratory, [facility], ran her saliva sample to conduct oncologic genetic testing. We discussed that she may have a second saliva kit sent to her home, or she may come to one of our cancer centers for a blood draw. Ms. Michelle opted to pursue a blood draw. I let her know that either my team or myself will be in touch to schedule her for the blood draw.

## 2022-09-01 ENCOUNTER — APPOINTMENT (OUTPATIENT)
Dept: HEMATOLOGY ONCOLOGY | Facility: CLINIC | Age: 67
End: 2022-09-01

## 2022-09-01 ENCOUNTER — LABORATORY RESULT (OUTPATIENT)
Age: 67
End: 2022-09-01

## 2022-09-01 NOTE — DISCUSSION/SUMMARY
[FreeTextEntry1] : 09/01/2022\par \par Patient consented and had her blood drawn at the [facility] today for the cancer genetic testing previously discussed (see [facility] note from 08/16/2022). Her Genetic Counselor, Leny Christiansen, will reach out to her once the results are available.\par \par Case Morrell MS, [facility] [place]\[facility] Genetic Counselor

## 2022-09-18 ENCOUNTER — FORM ENCOUNTER (OUTPATIENT)
Age: 67
End: 2022-09-18

## 2022-09-23 ENCOUNTER — NON-APPOINTMENT (OUTPATIENT)
Age: 67
End: 2022-09-23

## 2022-10-02 ENCOUNTER — APPOINTMENT (OUTPATIENT)
Dept: MRI IMAGING | Facility: IMAGING CENTER | Age: 67
End: 2022-10-02

## 2022-10-02 ENCOUNTER — OUTPATIENT (OUTPATIENT)
Dept: OUTPATIENT SERVICES | Facility: HOSPITAL | Age: 67
LOS: 1 days | End: 2022-10-02
Payer: MEDICARE

## 2022-10-02 DIAGNOSIS — Z90.09 ACQUIRED ABSENCE OF OTHER PART OF HEAD AND NECK: Chronic | ICD-10-CM

## 2022-10-02 DIAGNOSIS — D32.9 BENIGN NEOPLASM OF MENINGES, UNSPECIFIED: ICD-10-CM

## 2022-10-02 PROCEDURE — 70553 MRI BRAIN STEM W/O & W/DYE: CPT

## 2022-10-02 PROCEDURE — 70553 MRI BRAIN STEM W/O & W/DYE: CPT | Mod: 26,MH

## 2022-10-02 PROCEDURE — A9585: CPT

## 2022-10-05 ENCOUNTER — APPOINTMENT (OUTPATIENT)
Dept: RADIATION ONCOLOGY | Facility: CLINIC | Age: 67
End: 2022-10-05

## 2022-10-05 VITALS
BODY MASS INDEX: 27.73 KG/M2 | SYSTOLIC BLOOD PRESSURE: 122 MMHG | DIASTOLIC BLOOD PRESSURE: 68 MMHG | WEIGHT: 156.53 LBS | HEART RATE: 60 BPM | TEMPERATURE: 97.7 F | RESPIRATION RATE: 16 BRPM | OXYGEN SATURATION: 96 %

## 2022-10-05 PROCEDURE — 99213 OFFICE O/P EST LOW 20 MIN: CPT | Mod: 25

## 2022-10-05 NOTE — PHYSICAL EXAM
[] : no respiratory distress [Nondistended] : nondistended [Normal] : oriented to person, place and time, the affect was normal, the mood was normal and not anxious [de-identified] : some swelling to left nose

## 2022-10-05 NOTE — HISTORY OF PRESENT ILLNESS
[FreeTextEntry1] : Ms. Pitts presented for consideration of GKSRS for left cavernous sinus meningioma on 9/29/2020  She completed radiation therapy to her meningioma for a total of 2500 cgy 2/17-2/25/2021.\par \par RELEVANT MEDICAL HISTORY\par She has a past medical history thyroid cancer s/p thyroidectomy in 2006 followed by radiation.  She began to have RIGHT sided headaches at the end of August 2019 followed by nausea/vomiting. She saw her PCP and was treated for viral gastroenteritis. However, she continued to have persistent headaches and had MRI/MRA done in September 2019 which demonstrated a LEFT cavernous meningioma. \par \par She was seen by Dr. Tejeda on 9/26/19 and recommended evaluation for consideration of Gamma Knife Radiosurgery to meningioma. Additionally, the MRI did show possible narrowing of the LEFT intracavernous carotid narrowing. Due to this finding, Dr. Tejeda also recommended [facility] to evaluate for decreased blood flow due to possible LEFT carotid narrowing.Reviewed in tumor board on 9/30/19 and MRI demonstrated a large acute sagittal thrombosis. She was seen by Dr. Felder (neuro) and is now currently on lovenox bid. She is also following up with Dr. Martin (heme). Today she feels well. Notes headaches resolved after she started taking coumadin. Denies focal weakness, numbness, tingling, nausea, vomiting. Overall feeling well.  She still regularly feels some discomfort along the V2 distribution of the left side of her face.  \par \par 4/29/2021- Ms. Michelle presents today for follow up. TOday she is doing well. No longer has discomfort to V2 on the left. sometimes has some tearing to the left eye. overall has been feeling very well since treatment\par \par 10/5/2021- Ms. Michelle presents today for follow up. She underwent a brain MRI on 10/2/2021. There is no final read on this scan however her meningioma appears smaller. No V2 pain. no headaches, no visual trouble, no balance trouble, no nausea or vomiting. notes some pain and numbness from her left shoulder to her left fingertips, starting this summer\par \par 10/5/2022- Ms. Michelle presents today for follow up. Brain MRI done 10/2/2022 showed Since 10/1/2021 the left cavernous sinus and jerica clinoid meningioma appears to be slightly smaller.\par she has had MRVs over the summer. \par has not followed with Dr. Jacobsen recently. no headaches at all, no nausea, no focal weakness. notes intermittent vascular congestion to left side of nose and pressure to left V2.

## 2022-10-05 NOTE — REVIEW OF SYSTEMS
[Negative] : Psychiatric [Fever] : no fever [Chills] : no chills [Night Sweats] : no night sweats [Dysphagia] : no dysphagia [Loss of Hearing] : no loss of hearing [Chest Pain] : no chest pain [Palpitations] : no palpitations [Shortness Of Breath] : no shortness of breath [Cough] : no cough [Abdominal Pain] : no abdominal pain [Vomiting] : no vomiting [Constipation] : no constipation [Confused] : no confusion [Dizziness] : no dizziness [Fainting] : no fainting [Difficulty Walking] : no difficulty walking [de-identified] : denies headaches. has some intermittent pressure to left V2 distribution.

## 2022-10-10 NOTE — DISCUSSION/SUMMARY
[FreeTextEntry1] : REASON FOR CONSULT\par Jessica Michelle is a 66-year-old female who was called on 9/23/2022 for a discussion regarding her variant of uncertain significance (VUS) genetic testing results related to hereditary cancer predisposition. \par \par Ms. Michelle was originally seen at [facility] on 8/16/2022 for hereditary cancer predisposition risk assessment. Ms. Michelle was diagnosed with papillary thyroid cancer in 2006 at age 50, and she also has a family history of cancer. \par \par At her initial visit on 8/16/2022, Ms. Michelle decided to pursue genetic testing using the Pancreatic Cancer Panel, the Thyroid Cancer Panel, the Breast and Gyn Cancers Guidelines-Based Panel, and the Gastric Cancer Panels offered by [facility], which looked at 37 genes. Upon completion of this testing, Ms. Michelle was re-contacted on 9/23/2022 with her VUS result. At that time, she requested additional testing of genes related to brain cancer and meningioma. She consented to pursue this additional genetic testing via the [facility] Nervous System/Brain Cancer Panel plus BAP1. In total, Ms. Michelle underwent analysis for 52 total genes.\par \par TEST RESULTS: PDGFRA VARIANT OF UNCERTAIN SIGNIFICANCE (VUS) ([id number]; [id number])\par \par NO pathogenic (disease-causing) variants or additional VUSs were detected in the following 51 genes:  AIP, ALK, APC, TYRON, BAP1, BARD1, BMPR1A, BRCA1, BRCA2, BRIP1, CDH1, CDKN2A, CHEK2, CTNNA1, DICER1, EPCAM, HRAS, KIT, LZTR1, MEN1, MLH1, MSH2, MSH6, NBN, NF1, NF2, PALB2, PHOX2B, PMS2, POT1, SJZDJ2N, PTCH1, PTEN, RAD51C, RAD51D, RB1, RET, SDHA, SDHB, SDHC, SDHD, SMAD4, SMARCA4, SMARCB1, SMARCE1, STK11, SUFU\par TP53, TSC1, TSC2, VHL\par \par RESULTS INTERPRETATION AND ASSESSMENT:\par At this time the available evidence is insufficient to determine the role of this VUS in disease and the clinical significance of this result is uncertain. Pathogenic mutations in the PDGFRA gene are associated with GIST-Plus Syndrome.  It is unknown if the patient has an increased risk for the cancers associated with the PDGFRA gene at this time. \par \par The detection of this VUS should NOT currently change the patient’s medical management. It is NOT recommended at this time that family members use this result for predictive genetic testing or medical management decisions. With more research, a VUS may be reclassified as either disease-causing or benign. Ms. Michelle was encouraged to contact us every 2-3 years to enquire about any new information for this variant, or sooner if there are any changes in her personal or family history of cancer.  Such updates could possibly change our risk assessment and recommendations.\par \par We discussed that the cause of the Ms. Michelle’s cancer and family history of cancer remains unknown and that this result does not rule out a hereditary cancer risk in the patient since it is possible, although unlikely, the patient has a mutation that is not detectable by this analysis or is in an unidentified gene. It is also possible there is a hereditary cancer predisposition in the family, but the patient did not inherit it. \par \par IMPLICATIONS FOR THE PATIENT\par Given Ms. Michelle’s personal and current reported family history of cancer, and her negative genetic test results, the following screening guidelines and risk-reducing recommendations were discussed:\par \par THYROID: Long-term management and surveillance should be based on Ms. Michelle’ on- or post-treatment protocol as recommended by her oncologist \par \par PANCREATIC:\par •	No consensus exists for pancreatic cancer screening in individuals with a family history of pancreatic cancer. Per National Comprehensive Cancer Network (NCCN) and CAPS Consortium, screening may be considered for individuals with a family history of exocrine pancreatic cancer in =2 first-degree relatives even in the absence of a known pathogenic/likely pathogenic germline variant. \par •	Ms. Michelle does not meet the above criteria since she was not identified to have a germline variant in a pancreatic cancer predisposition gene and has 1 first-degree and 1 third-degree relative affected with pancreatic cancer instead of 2 first-degree relatives. We briefly reviewed the options for screening modality including contrast-enhanced MRI/magnetic resonance cholangiopancreatography (MRCP) and/or endoscopic ultrasound (EUS). \par •	Ms. Michelle expressed interested in discussing pancreatic cancer screening further with the high-risk program at [facility]. She was previously in contact with the [facility] due to her mother’s history, and Lyudmila Bertrand, Nurse Navigator at the [facility], originally referred Ms. Michelle to genetics. \par •	Ms. Michelle was provided with the contact information for the pancreas clinic for further discussion regarding the option of pancreatic cancer screening, and she was informed that this summary letter will be faxed to Lyudmila Bertrand as she is the referring provider. \par \par OTHER: In the absence of other indications, Ms. Michelle should practice age-appropriate cancer screening of other organ systems as recommended for the general population.\par \par In addition, we discussed Ms. Michelle’ sisters consider pursuing cancer risk assessment genetic counseling with the option of genetic testing due to their mother’s history of pancreatic cancer. \par \par PLAN:\par 1.	See above discussion for recommended management.\par 2.	Testing of family members based on this result is not indicated. However, other relatives may consider pursuing genetic testing based on the family history of cancer.\par 3.	The patient was encouraged to contact us every 2-3 years to check on any changes in interpretation of the VUS, or sooner if there are changes in her personal or family history of cancer.\par 4.	Patient provided with contact information for the pancreas clinic.\par 5.	Patient informed consult note(s) will be available through their [facility] patient portal and genetic test results will be released via [facility]’s laboratory portal. \par \par \par For any additional questions please call [facility] at [phone]. \par \par \par Leny Christiansen, MS\par Genetic Counselor, [facility]\par \par \par CC: \par Patient\par Lyudmila Bertrand, GYPSY

## 2022-10-10 NOTE — DISCUSSION/SUMMARY
[FreeTextEntry1] : REASON FOR CONSULT\par Jessica Michelle is a 66-year-old female who was called on 9/23/2022 for a discussion regarding her variant of uncertain significance (VUS) genetic testing results related to hereditary cancer predisposition. \par \par Ms. Michelle was originally seen at [facility] on 8/16/2022 for hereditary cancer predisposition risk assessment. Ms. Michelle was diagnosed with papillary thyroid cancer in 2006 at age 50, and she also has a family history of cancer. \par \par At her initial visit on 8/16/2022, Ms. Michelle decided to pursue genetic testing using the Pancreatic Cancer Panel, the Thyroid Cancer Panel, the Breast and Gyn Cancers Guidelines-Based Panel, and the Gastric Cancer Panels offered by [facility], which looked at 37 genes. Upon completion of this testing, Ms. Michelle was re-contacted on 9/23/2022 with her VUS result. At that time, she requested additional testing of genes related to brain cancer and meningioma. She consented to pursue this additional genetic testing via the [facility] Nervous System/Brain Cancer Panel plus BAP1. In total, Ms. Michelle underwent analysis for 52 total genes.\par \par TEST RESULTS: PDGFRA VARIANT OF UNCERTAIN SIGNIFICANCE (VUS) ([id number]; [id number])\par \par NO pathogenic (disease-causing) variants or additional VUSs were detected in the following 51 genes:  AIP, ALK, APC, TYRON, BAP1, BARD1, BMPR1A, BRCA1, BRCA2, BRIP1, CDH1, CDKN2A, CHEK2, CTNNA1, DICER1, EPCAM, HRAS, KIT, LZTR1, MEN1, MLH1, MSH2, MSH6, NBN, NF1, NF2, PALB2, PHOX2B, PMS2, POT1, RJPJV4I, PTCH1, PTEN, RAD51C, RAD51D, RB1, RET, SDHA, SDHB, SDHC, SDHD, SMAD4, SMARCA4, SMARCB1, SMARCE1, STK11, SUFU\par TP53, TSC1, TSC2, VHL\par \par RESULTS INTERPRETATION AND ASSESSMENT:\par At this time the available evidence is insufficient to determine the role of this VUS in disease and the clinical significance of this result is uncertain. Pathogenic mutations in the PDGFRA gene are associated with GIST-Plus Syndrome.  It is unknown if the patient has an increased risk for the cancers associated with the PDGFRA gene at this time. \par \par The detection of this VUS should NOT currently change the patient’s medical management. It is NOT recommended at this time that family members use this result for predictive genetic testing or medical management decisions. With more research, a VUS may be reclassified as either disease-causing or benign. Ms. Michelle was encouraged to contact us every 2-3 years to enquire about any new information for this variant, or sooner if there are any changes in her personal or family history of cancer.  Such updates could possibly change our risk assessment and recommendations.\par \par We discussed that the cause of the Ms. Michelle’s cancer and family history of cancer remains unknown and that this result does not rule out a hereditary cancer risk in the patient since it is possible, although unlikely, the patient has a mutation that is not detectable by this analysis or is in an unidentified gene. It is also possible there is a hereditary cancer predisposition in the family, but the patient did not inherit it. \par \par IMPLICATIONS FOR THE PATIENT\par Given Ms. Michelle’s personal and current reported family history of cancer, and her negative genetic test results, the following screening guidelines and risk-reducing recommendations were discussed:\par \par THYROID: Long-term management and surveillance should be based on Ms. Michelle’ on- or post-treatment protocol as recommended by her oncologist \par \par PANCREATIC:\par •	No consensus exists for pancreatic cancer screening in individuals with a family history of pancreatic cancer. Per National Comprehensive Cancer Network (NCCN) and CAPS Consortium, screening may be considered for individuals with a family history of exocrine pancreatic cancer in =2 first-degree relatives even in the absence of a known pathogenic/likely pathogenic germline variant. \par •	Ms. Michelle does not meet the above criteria since she was not identified to have a germline variant in a pancreatic cancer predisposition gene and has 1 first-degree and 1 third-degree relative affected with pancreatic cancer instead of 2 first-degree relatives. We briefly reviewed the options for screening modality including contrast-enhanced MRI/magnetic resonance cholangiopancreatography (MRCP) and/or endoscopic ultrasound (EUS). \par •	Ms. Michelle expressed interested in discussing pancreatic cancer screening further with the high-risk program at [facility]. She was previously in contact with the [facility] due to her mother’s history, and Lyudmila Bertrand, Nurse Navigator at the [facility], originally referred Ms. Michelle to genetics. \par •	Ms. Michelle was provided with the contact information for the pancreas clinic for further discussion regarding the option of pancreatic cancer screening, and she was informed that this summary letter will be faxed to Lyudmila Bertrand as she is the referring provider. \par \par OTHER: In the absence of other indications, Ms. Michelle should practice age-appropriate cancer screening of other organ systems as recommended for the general population.\par \par In addition, we discussed Ms. Michelle’ sisters consider pursuing cancer risk assessment genetic counseling with the option of genetic testing due to their mother’s history of pancreatic cancer. \par \par PLAN:\par 1.	See above discussion for recommended management.\par 2.	Testing of family members based on this result is not indicated. However, other relatives may consider pursuing genetic testing based on the family history of cancer.\par 3.	The patient was encouraged to contact us every 2-3 years to check on any changes in interpretation of the VUS, or sooner if there are changes in her personal or family history of cancer.\par 4.	Patient provided with contact information for the pancreas clinic.\par 5.	Patient informed consult note(s) will be available through their [facility] patient portal and genetic test results will be released via [facility]’s laboratory portal. \par \par \par For any additional questions please call [facility] at [phone]. \par \par \par Leny Christiansen, MS\par Genetic Counselor, [facility]\par \par \par CC: \par Patient\par Lyudmila Bertrand, GYPSY

## 2022-11-03 ENCOUNTER — APPOINTMENT (OUTPATIENT)
Dept: INTERNAL MEDICINE | Facility: CLINIC | Age: 67
End: 2022-11-03

## 2022-11-03 ENCOUNTER — NON-APPOINTMENT (OUTPATIENT)
Age: 67
End: 2022-11-03

## 2022-11-03 ENCOUNTER — LABORATORY RESULT (OUTPATIENT)
Age: 67
End: 2022-11-03

## 2022-11-03 VITALS
RESPIRATION RATE: 16 BRPM | OXYGEN SATURATION: 96 % | WEIGHT: 154 LBS | SYSTOLIC BLOOD PRESSURE: 122 MMHG | DIASTOLIC BLOOD PRESSURE: 75 MMHG | BODY MASS INDEX: 27.28 KG/M2 | HEART RATE: 78 BPM

## 2022-11-03 DIAGNOSIS — D49.6 NEOPLASM OF UNSPECIFIED BEHAVIOR OF BRAIN: ICD-10-CM

## 2022-11-03 PROCEDURE — 93000 ELECTROCARDIOGRAM COMPLETE: CPT

## 2022-11-03 PROCEDURE — 99397 PER PM REEVAL EST PAT 65+ YR: CPT | Mod: 25,GY

## 2022-11-03 NOTE — HISTORY OF PRESENT ILLNESS
[de-identified] : SEEN BY HEMATOLOGY AND NEURO AS WELL AS ENDO\par DECLINES INFLUENZA AND COVID-19 VACCINES

## 2022-11-03 NOTE — ASSESSMENT
[FreeTextEntry1] : CPE OF 66 Y OLD FEM WITH PMX OF ET,CAVERNOUS SINUS MENINGIOMA AND THROMBOSIS OF SUPERIOR SAGITAL SINUS = LABS AND EKG \par MAMMO AND GI EVAL ORDERED\par RTO 3-6 M

## 2022-11-04 LAB
25(OH)D3 SERPL-MCNC: 38.9 NG/ML
ALBUMIN SERPL ELPH-MCNC: 4.3 G/DL
ALP BLD-CCNC: 77 U/L
ALT SERPL-CCNC: 22 U/L
ANION GAP SERPL CALC-SCNC: 13 MMOL/L
AST SERPL-CCNC: 20 U/L
BASOPHILS # BLD AUTO: 0.04 K/UL
BASOPHILS NFR BLD AUTO: 0.6 %
BILIRUB SERPL-MCNC: 0.2 MG/DL
BUN SERPL-MCNC: 21 MG/DL
CALCIUM SERPL-MCNC: 9.2 MG/DL
CHLORIDE SERPL-SCNC: 104 MMOL/L
CHOLEST SERPL-MCNC: 203 MG/DL
CO2 SERPL-SCNC: 23 MMOL/L
CREAT SERPL-MCNC: 1 MG/DL
EGFR: 62 ML/MIN/1.73M2
EOSINOPHIL # BLD AUTO: 0.07 K/UL
EOSINOPHIL NFR BLD AUTO: 1 %
GLUCOSE SERPL-MCNC: 88 MG/DL
HCT VFR BLD CALC: 39.6 %
HDLC SERPL-MCNC: 51 MG/DL
HGB BLD-MCNC: 12.5 G/DL
IMM GRANULOCYTES NFR BLD AUTO: 0.4 %
LDLC SERPL CALC-MCNC: 131 MG/DL
LYMPHOCYTES # BLD AUTO: 2.26 K/UL
LYMPHOCYTES NFR BLD AUTO: 31.2 %
MAN DIFF?: NORMAL
MCHC RBC-ENTMCNC: 31.6 GM/DL
MCHC RBC-ENTMCNC: 33.3 PG
MCV RBC AUTO: 105.6 FL
MONOCYTES # BLD AUTO: 0.76 K/UL
MONOCYTES NFR BLD AUTO: 10.5 %
NEUTROPHILS # BLD AUTO: 4.08 K/UL
NEUTROPHILS NFR BLD AUTO: 56.3 %
NONHDLC SERPL-MCNC: 151 MG/DL
PLATELET # BLD AUTO: 548 K/UL
POTASSIUM SERPL-SCNC: 5 MMOL/L
PROT SERPL-MCNC: 6.9 G/DL
RBC # BLD: 3.75 M/UL
RBC # FLD: 13.2 %
SODIUM SERPL-SCNC: 141 MMOL/L
TRIGL SERPL-MCNC: 102 MG/DL
WBC # FLD AUTO: 7.24 K/UL

## 2023-01-05 ENCOUNTER — APPOINTMENT (OUTPATIENT)
Dept: MRI IMAGING | Facility: IMAGING CENTER | Age: 68
End: 2023-01-05
Payer: MEDICARE

## 2023-01-05 ENCOUNTER — OUTPATIENT (OUTPATIENT)
Dept: OUTPATIENT SERVICES | Facility: HOSPITAL | Age: 68
LOS: 1 days | End: 2023-01-05
Payer: MEDICARE

## 2023-01-05 DIAGNOSIS — C25.9 MALIGNANT NEOPLASM OF PANCREAS, UNSPECIFIED: ICD-10-CM

## 2023-01-05 DIAGNOSIS — Z90.09 ACQUIRED ABSENCE OF OTHER PART OF HEAD AND NECK: Chronic | ICD-10-CM

## 2023-01-05 PROCEDURE — A9585: CPT

## 2023-01-05 PROCEDURE — 74183 MRI ABD W/O CNTR FLWD CNTR: CPT

## 2023-01-05 PROCEDURE — 74183 MRI ABD W/O CNTR FLWD CNTR: CPT | Mod: 26,MH

## 2023-01-11 PROBLEM — C25.9 FAMILIAL CARCINOMA OF PANCREAS: Status: ACTIVE | Noted: 2022-10-11

## 2023-01-13 ENCOUNTER — APPOINTMENT (OUTPATIENT)
Dept: SURGICAL ONCOLOGY | Facility: CLINIC | Age: 68
End: 2023-01-13
Payer: MEDICARE

## 2023-01-13 VITALS
WEIGHT: 154 LBS | DIASTOLIC BLOOD PRESSURE: 69 MMHG | SYSTOLIC BLOOD PRESSURE: 125 MMHG | HEART RATE: 62 BPM | BODY MASS INDEX: 27.29 KG/M2 | RESPIRATION RATE: 16 BRPM | HEIGHT: 63 IN | OXYGEN SATURATION: 97 %

## 2023-01-13 DIAGNOSIS — Z80.0 FAMILY HISTORY OF MALIGNANT NEOPLASM OF DIGESTIVE ORGANS: ICD-10-CM

## 2023-01-13 DIAGNOSIS — C25.9 MALIGNANT NEOPLASM OF PANCREAS, UNSPECIFIED: ICD-10-CM

## 2023-01-13 PROCEDURE — 99204 OFFICE O/P NEW MOD 45 MIN: CPT

## 2023-01-13 NOTE — HISTORY OF PRESENT ILLNESS
[de-identified] :  67 year old female presenting for an initial consultation for the pancreatic high risk screening clinic. \par \par Self referred to pancreas center \par \par Mother was seen in our [facility] several months ago.   Was sent a saliva kit for genetic testing while at hospice but didn’t have test completed and has passed away. \par Patient reached out regards to screening and I sent her to genetics first   (she wasn’t sure of full family hx at the time).   \par Family history PDAC:  Mother (seen in pmdc, age 90),  Maternal aunt, Maternal first cousin (age 62)\par Genetic testin-gene genetic testing panel ordered through [facility], which revealed a VUS in the PDGFRA gene.\par \par Patient has history of meningioma s/p gamma knife radiosurgery in   \par \par MRI MRCP on 23- revealed a normal pancreas. \par \par 22: Patient is doing well, denies pain.

## 2023-01-13 NOTE — ASSESSMENT
[FreeTextEntry1] : 67F with strong family history of pancreatic cancer. \par She presents to discuss options for pancreatic cancer screening. \par Mother- age 90\par Maternal cousin- age 62\par SHe states she believes her mothers sister had a different type of cancer. \par No pathogenic mutation. \par PDGFR VUS. \par \par Baseline MRI reviewed and demonstrates a normal pancreas. \par \par We discussed the indications for screening a patient at increased risk for pancreatic cancer. \par At this time these include: \par 1. STK 11/ CDKN2A mutation\par 1. A known/ likely pathogenic germline variant in a pancreatic cancer susceptibility gene (TYRON, BRCA 1, BRCA 2, MLH1, MSH2, MSH6, EPCAM, PALB2, TP53) AND a family history of from the same side of the family (first or second degree relative)\par 2. Family history of exocrine pancreatic cancer in 2+ first degree relatives from same side of family. \par 3. Family history of exocrine pancreatic cancer in 3+ first and/ or second degree relatives from same side of family. \par \par I discussed symptoms that would raise my concern for malignant transformation including weight loss, jaundice, abdominal/ back pain, and new onset diabetes. Should these develop, she should call immediately. \par \par \par I offered continues surveillance given the unclear nature of her aunt's malignancy. The patient prefers to hold off on screening at this time. She will let us know if she changes her mind. She reports that her son recently developed new onset diabetes in the absenc eof risk factors. I offered to see him in consultation. \par

## 2023-01-13 NOTE — PHYSICAL EXAM
[Normal Female] : normal external genitalia, urethral meatus without lesions or discharge. On bimanual exam uterus, adnexa, parametria all normal without any discrete masses. [Normal] : full range of motion and no deformities appreciated

## 2023-02-16 ENCOUNTER — APPOINTMENT (OUTPATIENT)
Dept: HEMATOLOGY ONCOLOGY | Facility: CLINIC | Age: 68
End: 2023-02-16
Payer: MEDICARE

## 2023-02-16 DIAGNOSIS — Z15.89 GENETIC SUSCEPTIBILITY TO OTHER DISEASE: ICD-10-CM

## 2023-02-16 PROCEDURE — 99214 OFFICE O/P EST MOD 30 MIN: CPT | Mod: 95

## 2023-02-16 RX ORDER — APIXABAN 5 MG/1
5 TABLET, FILM COATED ORAL
Qty: 180 | Refills: 3 | Status: ACTIVE | COMMUNITY
Start: 2020-10-12 | End: 1900-01-01

## 2023-02-16 RX ORDER — HYDROXYUREA 500 MG/1
500 CAPSULE ORAL DAILY
Qty: 180 | Refills: 3 | Status: ACTIVE | COMMUNITY
Start: 2019-11-18 | End: 1900-01-01

## 2023-02-16 NOTE — HISTORY OF PRESENT ILLNESS
[de-identified] : May 14, 2020 patient never had repeat blood work with PCP and platelet count was up to 591,000... She was taking Hydrea only at thousand milligram Monday Wednesday Friday...\par \par October 12, 2020 patient returns for follow-up... Main complaint is that she gained weight because she is not eating green vegetables salad because she is on Coumadin...\par \par April 21, 2021 here for follow-up... States "I feel fine, I even forgot I am sick"...\par \par February 3, 2022 patient needs refill on her Hydrea... Doing well no complaints... Last CBC over 6 months ago\par \par August 11, 2022 patient requested follow-up appointment since she suffers from excessive heat, and is sweating a lot this summer... She states she never used to sweat in the summer...\par \par February 16 , 2023 requested follow-up appointment since she needs renewal on her meds... Doing well has no complaints... Recent CBC done in November was stable\par \par  [Home] : at home, [unfilled] , at the time of the visit. [Medical Office: ([facility])___] : at the medical office located in  [Verbal consent obtained from patient] : the patient, [unfilled]

## 2023-02-16 NOTE — ASSESSMENT
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 Patient thrombocytosis is controlled by Hydrea 500 mg daily, with at 1000 mg Monday, Wednesday, Friday.... She is also on anticoagulation with Eliquis which she tolerates very well... \par \par Repeat blood work in the system from November all within normal limits...\par \par I ordered repeat blood work to be done end of February...\par \par For now patient to continue Hydrea 500 mg daily, and to 1000 mg on Monday Wednesday and Friday... Continue Eliquis.\par \par Follow-up with hematology in 6 months or sooner if needed

## 2023-03-08 ENCOUNTER — LABORATORY RESULT (OUTPATIENT)
Age: 68
End: 2023-03-08

## 2023-03-11 ENCOUNTER — TRANSCRIPTION ENCOUNTER (OUTPATIENT)
Age: 68
End: 2023-03-11

## 2023-03-28 LAB
ALBUMIN SERPL ELPH-MCNC: 4.5 G/DL
ALP BLD-CCNC: 74 U/L
ALT SERPL-CCNC: 26 U/L
ANION GAP SERPL CALC-SCNC: 13 MMOL/L
AST SERPL-CCNC: 20 U/L
BASOPHILS # BLD AUTO: 0.06 K/UL
BASOPHILS NFR BLD AUTO: 0.7 %
BILIRUB SERPL-MCNC: 0.2 MG/DL
BUN SERPL-MCNC: 21 MG/DL
CALCIUM SERPL-MCNC: 9.3 MG/DL
CHLORIDE SERPL-SCNC: 103 MMOL/L
CO2 SERPL-SCNC: 27 MMOL/L
CREAT SERPL-MCNC: 0.98 MG/DL
EGFR: 63 ML/MIN/1.73M2
EOSINOPHIL # BLD AUTO: 0.08 K/UL
EOSINOPHIL NFR BLD AUTO: 1 %
ERYTHROCYTE [SEDIMENTATION RATE] IN BLOOD BY WESTERGREN METHOD: 40 MM/HR
GLUCOSE SERPL-MCNC: 86 MG/DL
HCT VFR BLD CALC: 40.7 %
HGB BLD-MCNC: 12.4 G/DL
IMM GRANULOCYTES NFR BLD AUTO: 0.4 %
LDH SERPL-CCNC: 194 U/L
LYMPHOCYTES # BLD AUTO: 2.17 K/UL
LYMPHOCYTES NFR BLD AUTO: 26.5 %
MAN DIFF?: NORMAL
MCHC RBC-ENTMCNC: 30.5 GM/DL
MCHC RBC-ENTMCNC: 32.6 PG
MCV RBC AUTO: 107.1 FL
MONOCYTES # BLD AUTO: 0.64 K/UL
MONOCYTES NFR BLD AUTO: 7.8 %
NEUTROPHILS # BLD AUTO: 5.21 K/UL
NEUTROPHILS NFR BLD AUTO: 63.6 %
PLATELET # BLD AUTO: 624 K/UL
POTASSIUM SERPL-SCNC: 4.7 MMOL/L
PROT SERPL-MCNC: 7.2 G/DL
RBC # BLD: 3.8 M/UL
RBC # FLD: 12.9 %
SODIUM SERPL-SCNC: 143 MMOL/L
TSH SERPL-ACNC: 0.04 UIU/ML
WBC # FLD AUTO: 8.19 K/UL

## 2023-05-26 ENCOUNTER — APPOINTMENT (OUTPATIENT)
Dept: INTERNAL MEDICINE | Facility: CLINIC | Age: 68
End: 2023-05-26
Payer: MEDICARE

## 2023-05-26 VITALS
BODY MASS INDEX: 27.29 KG/M2 | SYSTOLIC BLOOD PRESSURE: 135 MMHG | HEIGHT: 63 IN | WEIGHT: 154 LBS | HEART RATE: 65 BPM | RESPIRATION RATE: 14 BRPM | OXYGEN SATURATION: 96 % | DIASTOLIC BLOOD PRESSURE: 78 MMHG

## 2023-05-26 PROCEDURE — 99214 OFFICE O/P EST MOD 30 MIN: CPT

## 2023-05-26 NOTE — ASSESSMENT
[FreeTextEntry1] : 67 Y OLD FEM WITH PMX OF PAPILLARY THYROID CA ,ET ,AND LONG USE OF ANTICOAG= CONTINUE SAME MEDS ;RTO 6 M FOR CPE

## 2023-05-26 NOTE — HISTORY OF PRESENT ILLNESS
[de-identified] : COMES FOR 5 M F/U \par SEEN BY HEMATOLOGY AND ENDO ;LEVOTHYROXINE DECREASED FROM 200  MCG FEW MONTHS AGO TSH STILL SUPPRESSED BUT ENDO PLANS TO KEEP IT DUE TO HX OF THYROID CA

## 2023-05-26 NOTE — PHYSICAL EXAM
[No Acute Distress] : no acute distress [Normal Outer Ear/Nose] : the outer ears and nose were normal in appearance [Normal] : soft, non-tender, non-distended, no masses palpated, no HSM and normal bowel sounds [Alert and Oriented x3] : oriented to person, place, and time

## 2023-08-20 NOTE — ED PROVIDER NOTE - ATTENDING CONTRIBUTION TO CARE
98.2
I performed a history and physical exam of the patient and discussed their management with the resident/ACP/medical or PA student. I reviewed the resident/ACP/medical or PA student's note and agree with the documented findings and plan of care.  attn - see MDM

## 2023-11-07 ENCOUNTER — NON-APPOINTMENT (OUTPATIENT)
Age: 68
End: 2023-11-07

## 2023-11-07 ENCOUNTER — APPOINTMENT (OUTPATIENT)
Dept: INTERNAL MEDICINE | Facility: CLINIC | Age: 68
End: 2023-11-07

## 2023-11-07 ENCOUNTER — APPOINTMENT (OUTPATIENT)
Dept: INTERNAL MEDICINE | Facility: CLINIC | Age: 68
End: 2023-11-07
Payer: MEDICARE

## 2023-11-07 ENCOUNTER — LABORATORY RESULT (OUTPATIENT)
Age: 68
End: 2023-11-07

## 2023-11-07 VITALS
DIASTOLIC BLOOD PRESSURE: 80 MMHG | HEART RATE: 60 BPM | SYSTOLIC BLOOD PRESSURE: 110 MMHG | WEIGHT: 158 LBS | OXYGEN SATURATION: 98 % | RESPIRATION RATE: 14 BRPM | BODY MASS INDEX: 28 KG/M2 | HEIGHT: 63 IN

## 2023-11-07 DIAGNOSIS — Z00.00 ENCOUNTER FOR GENERAL ADULT MEDICAL EXAMINATION W/OUT ABNORMAL FINDINGS: ICD-10-CM

## 2023-11-07 LAB
ALBUMIN SERPL ELPH-MCNC: 4.3 G/DL
ALP BLD-CCNC: 71 U/L
ALT SERPL-CCNC: 22 U/L
ANION GAP SERPL CALC-SCNC: 12 MMOL/L
AST SERPL-CCNC: 17 U/L
BILIRUB SERPL-MCNC: 0.2 MG/DL
BUN SERPL-MCNC: 19 MG/DL
CALCIUM SERPL-MCNC: 9.2 MG/DL
CHLORIDE SERPL-SCNC: 105 MMOL/L
CHOLEST SERPL-MCNC: 216 MG/DL
CO2 SERPL-SCNC: 24 MMOL/L
CREAT SERPL-MCNC: 0.88 MG/DL
EGFR: 72 ML/MIN/1.73M2
GLUCOSE SERPL-MCNC: 96 MG/DL
HCT VFR BLD CALC: 42.1 %
HDLC SERPL-MCNC: 51 MG/DL
HGB BLD-MCNC: 13.1 G/DL
LDLC SERPL CALC-MCNC: 145 MG/DL
MCHC RBC-ENTMCNC: 31.1 GM/DL
MCHC RBC-ENTMCNC: 32 PG
MCV RBC AUTO: 102.9 FL
NONHDLC SERPL-MCNC: 165 MG/DL
PLATELET # BLD AUTO: 671 K/UL
POTASSIUM SERPL-SCNC: 5.2 MMOL/L
PROT SERPL-MCNC: 6.7 G/DL
RBC # BLD: 4.09 M/UL
RBC # FLD: 13 %
SODIUM SERPL-SCNC: 141 MMOL/L
TRIGL SERPL-MCNC: 114 MG/DL
TSH SERPL-ACNC: 0.05 UIU/ML
WBC # FLD AUTO: 7.94 K/UL

## 2023-11-07 PROCEDURE — 99212 OFFICE O/P EST SF 10 MIN: CPT | Mod: 25

## 2023-11-07 PROCEDURE — G0439: CPT

## 2023-11-07 PROCEDURE — 93000 ELECTROCARDIOGRAM COMPLETE: CPT

## 2023-11-08 LAB
25(OH)D3 SERPL-MCNC: 22.8 NG/ML
APPEARANCE: CLEAR
BILIRUBIN URINE: NEGATIVE
BLOOD URINE: NEGATIVE
COLOR: YELLOW
GLUCOSE QUALITATIVE U: NEGATIVE MG/DL
KETONES URINE: NEGATIVE MG/DL
LEUKOCYTE ESTERASE URINE: NEGATIVE
NITRITE URINE: NEGATIVE
PH URINE: 6
PROTEIN URINE: NEGATIVE MG/DL
SPECIFIC GRAVITY URINE: 1.02
UROBILINOGEN URINE: 0.2 MG/DL

## 2023-12-28 ENCOUNTER — APPOINTMENT (OUTPATIENT)
Dept: ORTHOPEDIC SURGERY | Facility: CLINIC | Age: 68
End: 2023-12-28
Payer: MEDICARE

## 2023-12-28 ENCOUNTER — NON-APPOINTMENT (OUTPATIENT)
Age: 68
End: 2023-12-28

## 2023-12-28 VITALS — WEIGHT: 160 LBS | HEIGHT: 63 IN | BODY MASS INDEX: 28.35 KG/M2

## 2023-12-28 PROCEDURE — 20526 THER INJECTION CARP TUNNEL: CPT | Mod: LT

## 2023-12-28 PROCEDURE — 99203 OFFICE O/P NEW LOW 30 MIN: CPT | Mod: 25

## 2023-12-28 NOTE — END OF VISIT
[FreeTextEntry3] :  All medical record entries made by the Scribe were at my,  Dr. Rubin Viveros MD., direction and personally dictated by me on 12/28/2023. I have personally reviewed the chart and agree that the record accurately reflects my personal performance of the history, physical exam, assessment and plan.

## 2023-12-28 NOTE — DISCUSSION/SUMMARY
[FreeTextEntry1] :  The underlying pathophysiology was reviewed with the patient. XR films were reviewed with the patient. Discussed at length the nature of the patients condition.   The patient wishes to proceed with a cortisone injection at this time. The skin was prepped with alcohol and sprayed with Ethyl Chloride. An injection of 0.5 cc 1% Lidocaine without epinephrine, 0.25 cc Kenalog 40 mg, and 0.25 cc Dexamethasone was administered into the left hand carpal tunnel. The patient tolerated procedure well. Apply ice.  The patient also wishes to proceed with surgery on Februrary for carpel tunnel release of the left hand.  Risks and benefits discussed. Nature of the operation reviewed. Post-operative recovery and patient experience were reviewed. Patient will be put in contact with my surgical coordinator.  All questions answered, understanding verbalized. Patient in agreement with plan of care.

## 2023-12-28 NOTE — PHYSICAL EXAM
[de-identified] :  Patient is WDWN, alert, and in no acute distress. Breathing is unlabored. She is grossly oriented to person, place, and time.  Left Wrist:   Mild tenderness and edema, or deformities. No thenar atrophy. Full ROM with decreased sensation along median nerve distribution. Tests/Signs: Tinel's sign is negative over carpal tunnel, Phalen's test is positive. [de-identified] : Nerve test revealed left median neuropathy at or distal to the wrist consistent with the clinical diagnosis of CTS. Done by Dr. Luevano on 12/15/23

## 2023-12-28 NOTE — ADDENDUM
[FreeTextEntry1] :  I, Ai Mccann wrote this note acting as a scribe for Dr. Rubin Viveros on Dec 28, 2023.

## 2023-12-28 NOTE — HISTORY OF PRESENT ILLNESS
[FreeTextEntry1] : Pt is 68 year female with complins of pain and tingling on the left hand. She had trigger finger surgery with Dr. Viveros before and wants a thorough evaluation. She had an EMG by Dr Luevano which revealed left carpal tunnel syndrome. She has recently been feeling pain in the right middle finger.

## 2024-02-08 ENCOUNTER — OUTPATIENT (OUTPATIENT)
Dept: OUTPATIENT SERVICES | Facility: HOSPITAL | Age: 69
LOS: 1 days | End: 2024-02-08
Payer: MEDICARE

## 2024-02-08 VITALS
DIASTOLIC BLOOD PRESSURE: 77 MMHG | TEMPERATURE: 98 F | HEIGHT: 62.25 IN | SYSTOLIC BLOOD PRESSURE: 127 MMHG | HEART RATE: 51 BPM | OXYGEN SATURATION: 98 % | RESPIRATION RATE: 14 BRPM | WEIGHT: 160.06 LBS

## 2024-02-08 DIAGNOSIS — G56.02 CARPAL TUNNEL SYNDROME, LEFT UPPER LIMB: ICD-10-CM

## 2024-02-08 DIAGNOSIS — Z01.818 ENCOUNTER FOR OTHER PREPROCEDURAL EXAMINATION: ICD-10-CM

## 2024-02-08 DIAGNOSIS — Z90.09 ACQUIRED ABSENCE OF OTHER PART OF HEAD AND NECK: Chronic | ICD-10-CM

## 2024-02-08 DIAGNOSIS — Z98.890 OTHER SPECIFIED POSTPROCEDURAL STATES: Chronic | ICD-10-CM

## 2024-02-08 LAB
ALBUMIN SERPL ELPH-MCNC: 3.7 G/DL — SIGNIFICANT CHANGE UP (ref 3.3–5)
ALP SERPL-CCNC: 65 U/L — SIGNIFICANT CHANGE UP (ref 30–120)
ALT FLD-CCNC: 39 U/L — SIGNIFICANT CHANGE UP (ref 10–60)
ANION GAP SERPL CALC-SCNC: 10 MMOL/L — SIGNIFICANT CHANGE UP (ref 5–17)
APTT BLD: 38.1 SEC — HIGH (ref 24.5–35.6)
AST SERPL-CCNC: 25 U/L — SIGNIFICANT CHANGE UP (ref 10–40)
BILIRUB SERPL-MCNC: 0.4 MG/DL — SIGNIFICANT CHANGE UP (ref 0.2–1.2)
BUN SERPL-MCNC: 24 MG/DL — HIGH (ref 7–23)
CALCIUM SERPL-MCNC: 8.5 MG/DL — SIGNIFICANT CHANGE UP (ref 8.4–10.5)
CHLORIDE SERPL-SCNC: 105 MMOL/L — SIGNIFICANT CHANGE UP (ref 96–108)
CO2 SERPL-SCNC: 24 MMOL/L — SIGNIFICANT CHANGE UP (ref 22–31)
CREAT SERPL-MCNC: 0.9 MG/DL — SIGNIFICANT CHANGE UP (ref 0.5–1.3)
EGFR: 69 ML/MIN/1.73M2 — SIGNIFICANT CHANGE UP
GLUCOSE SERPL-MCNC: 101 MG/DL — HIGH (ref 70–99)
HCT VFR BLD CALC: 39.3 % — SIGNIFICANT CHANGE UP (ref 34.5–45)
HGB BLD-MCNC: 12.6 G/DL — SIGNIFICANT CHANGE UP (ref 11.5–15.5)
INR BLD: 1.33 RATIO — HIGH (ref 0.85–1.18)
MCHC RBC-ENTMCNC: 32.1 GM/DL — SIGNIFICANT CHANGE UP (ref 32–36)
MCHC RBC-ENTMCNC: 32.6 PG — SIGNIFICANT CHANGE UP (ref 27–34)
MCV RBC AUTO: 101.6 FL — HIGH (ref 80–100)
NRBC # BLD: 0 /100 WBCS — SIGNIFICANT CHANGE UP (ref 0–0)
PLATELET # BLD AUTO: 616 K/UL — HIGH (ref 150–400)
POTASSIUM SERPL-MCNC: 3.6 MMOL/L — SIGNIFICANT CHANGE UP (ref 3.5–5.3)
POTASSIUM SERPL-SCNC: 3.6 MMOL/L — SIGNIFICANT CHANGE UP (ref 3.5–5.3)
PROT SERPL-MCNC: 7.5 G/DL — SIGNIFICANT CHANGE UP (ref 6–8.3)
PROTHROM AB SERPL-ACNC: 14.4 SEC — HIGH (ref 9.5–13)
RBC # BLD: 3.87 M/UL — SIGNIFICANT CHANGE UP (ref 3.8–5.2)
RBC # FLD: 13.3 % — SIGNIFICANT CHANGE UP (ref 10.3–14.5)
SODIUM SERPL-SCNC: 139 MMOL/L — SIGNIFICANT CHANGE UP (ref 135–145)
WBC # BLD: 7.44 K/UL — SIGNIFICANT CHANGE UP (ref 3.8–10.5)
WBC # FLD AUTO: 7.44 K/UL — SIGNIFICANT CHANGE UP (ref 3.8–10.5)

## 2024-02-08 PROCEDURE — 85610 PROTHROMBIN TIME: CPT

## 2024-02-08 PROCEDURE — 93010 ELECTROCARDIOGRAM REPORT: CPT

## 2024-02-08 PROCEDURE — G0463: CPT

## 2024-02-08 PROCEDURE — 85027 COMPLETE CBC AUTOMATED: CPT

## 2024-02-08 PROCEDURE — 93005 ELECTROCARDIOGRAM TRACING: CPT

## 2024-02-08 PROCEDURE — 85730 THROMBOPLASTIN TIME PARTIAL: CPT

## 2024-02-08 PROCEDURE — 80053 COMPREHEN METABOLIC PANEL: CPT

## 2024-02-08 PROCEDURE — 36415 COLL VENOUS BLD VENIPUNCTURE: CPT

## 2024-02-08 RX ORDER — LEVOTHYROXINE SODIUM 125 MCG
1 TABLET ORAL
Refills: 0 | DISCHARGE

## 2024-02-08 RX ORDER — LEVOTHYROXINE SODIUM 125 MCG
1 TABLET ORAL
Qty: 0 | Refills: 0 | DISCHARGE

## 2024-02-08 NOTE — H&P PST ADULT - NSICDXFAMILYHX_GEN_ALL_CORE_FT
FAMILY HISTORY:  Father  Still living? No  Family history of asthma, Age at diagnosis: Age Unknown  Family history of CHF (congestive heart failure), Age at diagnosis: Age Unknown    Mother  Still living? No  Family history of cardiac pacemaker, Age at diagnosis: Age Unknown  Family history of glaucoma, Age at diagnosis: Age Unknown  Family history of hypertension, Age at diagnosis: Age Unknown  Family history of pancreatic cancer, Age at diagnosis: Age Unknown  Family history of type 2 diabetes mellitus, Age at diagnosis: Age Unknown    Sibling  Still living? No  Family history of asthma, Age at diagnosis: Age Unknown    Child  Still living? Yes, Estimated age: 31-40  Family history of type 2 diabetes mellitus, Age at diagnosis: Age Unknown

## 2024-02-08 NOTE — H&P PST ADULT - HISTORY OF PRESENT ILLNESS
69 yo female presents with c/o left hand numbness and left elbow and fingertip pain for the last 2 years. She has worn splints at night with some relief. She received 1 cortisone injection after consulting with Dr. Viveros in January 2024 with some relief. Reports 7-8/10 prior to the injections and now decreased to 3-4/10.

## 2024-02-08 NOTE — H&P PST ADULT - NSICDXPASTSURGICALHX_GEN_ALL_CORE_FT
PAST SURGICAL HISTORY:  H/O thyroidectomy     History of lumpectomy of left breast     Status post trigger finger release

## 2024-02-08 NOTE — H&P PST ADULT - COMMENTS
meningioma treated with RT 2021, annual MRI until 2022 and then never followed up in 2023  history of covid December 2020, mild symptoms and no treatment  denies any current cold or flu like symptoms, including fever, cough, sinus congestion, body aches or chills  2006 thyroid cancer treated with radioactive iodine and surgery

## 2024-02-08 NOTE — H&P PST ADULT - NSANTHOSAYNRD_GEN_A_CORE
neck 14.5 inches/No. KATHE screening performed.  STOP BANG Legend: 0-2 = LOW Risk; 3-4 = INTERMEDIATE Risk; 5-8 = HIGH Risk

## 2024-02-08 NOTE — H&P PST ADULT - MUSCULOSKELETAL
negative phalen's and negative tinel's sign/normal/ROM intact/no joint erythema/no joint warmth/no calf tenderness/normal gait/strength 5/5 bilateral upper extremities/strength 5/5 bilateral lower extremities/no chest wall tenderness/extremities exam details…

## 2024-02-08 NOTE — H&P PST ADULT - NSICDXPASTMEDICALHX_GEN_ALL_CORE_FT
PAST MEDICAL HISTORY:  Brain tumor meningioma    Cavernous sinus tumor     Childhood asthma     COVID-19 vaccine series completed     Early cataracts, bilateral     Essential thrombocythemia     History of COVID-19     History of headache     JAK2 gene mutation     Left carpal tunnel syndrome     Osteopenia     Sinus bradycardia     Thrombosis, superior sagittal sinus     Thyroid carcinoma     Varicose veins

## 2024-02-08 NOTE — H&P PST ADULT - PROBLEM SELECTOR PLAN 1
left carpal tunnel release. medical, hematology and neurology clearances requested. obtain last brain MRI. called neurologist office to see if an appointment for clearance and a repeat MRI can be scheduled prior to surgery, awaiting a call back. instructed to take synthroid AM of surgery with sips of water. surgical wash instructions reviewed and verbalized understanding

## 2024-02-08 NOTE — H&P PST ADULT - NS PRO AD PATIENT TYPE
"Entered patient's room after she pressed call button. Gave patient white board to write notes and she wrote \"where is the $$$$\" and \"where are u going\". Explained to patient that she was in the hospital due to a burst appendix and had a tube in her throat to help her breathe. Patient then began shaking her head, mouthing \"not hospital\". Upon showing patient scrub top saying [facility] and ID badge, patient began using marker to write on this RN's shirt and arm. RN asked patient to put down the marker, patient reached up and grabbed ETT. This RN called podmate for restraints and RT, patient scratched at RN's chest as attempting to remove other hand from ETT. Patient then placed back in restraints and restarted on sedation. Explained reason to patient. RT assessed ETT and breath sounds, SpO2 remained stable, no evidence of respiratory distress from patient.   " Health Care Proxy (HCP)

## 2024-02-08 NOTE — H&P PST ADULT - BP NONINVASIVE MEAN (MM HG)
Radiology Discharge Summary      Hospital Course: No complications    Admit Date: 5/7/2019  Discharge Date: 05/07/2019     Instructions Given to Patient: Yes  Diet: Resume prior diet  Activity: activity as tolerated and no driving for today    Description of Condition on Discharge: Stable  Vital Signs (Most Recent): Temp: 98 °F (36.7 °C) (05/07/19 1245)  Pulse: 74 (05/07/19 1300)  Resp: 16 (05/07/19 1300)  BP: 138/87 (05/07/19 1300)  SpO2: 100 % (05/07/19 1300)    Discharge Disposition: Home    Discharge Diagnosis:     MAC    PICC placement     Follow-up:   Per referring physician    Kendrick Mccollum MD  Department of Radiology  Pager: [phone]  
[phone]

## 2024-02-13 PROBLEM — D49.6 NEOPLASM OF UNSPECIFIED BEHAVIOR OF BRAIN: Chronic | Status: ACTIVE | Noted: 2024-02-08

## 2024-02-13 PROBLEM — I83.90 ASYMPTOMATIC VARICOSE VEINS OF UNSPECIFIED LOWER EXTREMITY: Chronic | Status: ACTIVE | Noted: 2024-02-08

## 2024-02-13 PROBLEM — J45.909 UNSPECIFIED ASTHMA, UNCOMPLICATED: Chronic | Status: ACTIVE | Noted: 2024-02-08

## 2024-02-13 PROBLEM — M85.80 OTHER SPECIFIED DISORDERS OF BONE DENSITY AND STRUCTURE, UNSPECIFIED SITE: Chronic | Status: ACTIVE | Noted: 2024-02-08

## 2024-02-13 PROBLEM — Z92.29 PERSONAL HISTORY OF OTHER DRUG THERAPY: Chronic | Status: ACTIVE | Noted: 2024-02-08

## 2024-02-13 PROBLEM — R00.1 BRADYCARDIA, UNSPECIFIED: Chronic | Status: ACTIVE | Noted: 2024-02-08

## 2024-02-13 PROBLEM — Z87.898 PERSONAL HISTORY OF OTHER SPECIFIED CONDITIONS: Chronic | Status: ACTIVE | Noted: 2024-02-08

## 2024-02-13 PROBLEM — G56.02 CARPAL TUNNEL SYNDROME, LEFT UPPER LIMB: Chronic | Status: ACTIVE | Noted: 2024-02-08

## 2024-02-13 PROBLEM — Z15.89 GENETIC SUSCEPTIBILITY TO OTHER DISEASE: Chronic | Status: ACTIVE | Noted: 2024-02-08

## 2024-02-13 PROBLEM — Z86.16 PERSONAL HISTORY OF COVID-19: Chronic | Status: ACTIVE | Noted: 2024-02-08

## 2024-02-13 PROBLEM — H26.9 UNSPECIFIED CATARACT: Chronic | Status: ACTIVE | Noted: 2024-02-08

## 2024-02-13 PROBLEM — D47.3 ESSENTIAL (HEMORRHAGIC) THROMBOCYTHEMIA: Chronic | Status: ACTIVE | Noted: 2024-02-08

## 2024-02-15 ENCOUNTER — APPOINTMENT (OUTPATIENT)
Dept: MRI IMAGING | Facility: CLINIC | Age: 69
End: 2024-02-15
Payer: MEDICARE

## 2024-02-15 ENCOUNTER — OUTPATIENT (OUTPATIENT)
Dept: OUTPATIENT SERVICES | Facility: HOSPITAL | Age: 69
LOS: 1 days | End: 2024-02-15
Payer: MEDICARE

## 2024-02-15 ENCOUNTER — APPOINTMENT (OUTPATIENT)
Dept: INTERNAL MEDICINE | Facility: CLINIC | Age: 69
End: 2024-02-15
Payer: MEDICARE

## 2024-02-15 VITALS
DIASTOLIC BLOOD PRESSURE: 80 MMHG | SYSTOLIC BLOOD PRESSURE: 157 MMHG | WEIGHT: 161 LBS | HEIGHT: 63 IN | HEART RATE: 54 BPM | RESPIRATION RATE: 14 BRPM | BODY MASS INDEX: 28.53 KG/M2 | TEMPERATURE: 97.7 F | OXYGEN SATURATION: 96 %

## 2024-02-15 VITALS — DIASTOLIC BLOOD PRESSURE: 80 MMHG | SYSTOLIC BLOOD PRESSURE: 150 MMHG

## 2024-02-15 DIAGNOSIS — Z98.890 OTHER SPECIFIED POSTPROCEDURAL STATES: Chronic | ICD-10-CM

## 2024-02-15 DIAGNOSIS — Z01.818 ENCOUNTER FOR OTHER PREPROCEDURAL EXAMINATION: ICD-10-CM

## 2024-02-15 DIAGNOSIS — R20.2 PARESTHESIA OF SKIN: ICD-10-CM

## 2024-02-15 DIAGNOSIS — D32.9 BENIGN NEOPLASM OF MENINGES, UNSPECIFIED: ICD-10-CM

## 2024-02-15 DIAGNOSIS — Z90.09 ACQUIRED ABSENCE OF OTHER PART OF HEAD AND NECK: Chronic | ICD-10-CM

## 2024-02-15 PROCEDURE — 70553 MRI BRAIN STEM W/O & W/DYE: CPT

## 2024-02-15 PROCEDURE — 70553 MRI BRAIN STEM W/O & W/DYE: CPT | Mod: 26,MH

## 2024-02-15 PROCEDURE — 99215 OFFICE O/P EST HI 40 MIN: CPT

## 2024-02-15 PROCEDURE — A9585: CPT

## 2024-02-15 NOTE — ASSESSMENT
[Patient Optimized for Surgery] : Patient optimized for surgery [No Further Testing Recommended] : no further testing recommended [Modify anticoagulant treatment prior to procedure] : Modify anticoagulant treatment prior to procedure [Modify anti-platelet treatment prior to procedure] : Modify anti-platelet treatment prior to procedure [Continue medications as is] : Continue current medications [As per surgery] : as per surgery [FreeTextEntry4] : 68 Y OLD FEM WITH PMX OF THYROID CA ,ETJAK2 GENE MUTATION ON CHRONIC ANTICOAG AFTER SUPERIOR SAG SINUS THROMBOSIS AT ACCEPTABLE RISK FOR SURGERY  [FreeTextEntry5] : AS PER HEMATOLOGY REOCMM PT WILL HOLD ELIQUIS 5 DAYS PRIORTO SURGERY  [FreeTextEntry6] : AS PER HEM-ONC

## 2024-02-15 NOTE — PHYSICAL EXAM
[No Acute Distress] : no acute distress [Normal Sclera/Conjunctiva] : normal sclera/conjunctiva [Normal Outer Ear/Nose] : the outer ears and nose were normal in appearance [No JVD] : no jugular venous distention [Normal] : normal rate, regular rhythm, normal S1 and S2 and no murmur heard [No Edema] : there was no peripheral edema [Soft] : abdomen soft [Non Tender] : non-tender [Normal Bowel Sounds] : normal bowel sounds [Normal Anterior Cervical Nodes] : no anterior cervical lymphadenopathy [No CVA Tenderness] : no CVA  tenderness [No Rash] : no rash [Coordination Grossly Intact] : coordination grossly intact [Alert and Oriented x3] : oriented to person, place, and time

## 2024-02-15 NOTE — HISTORY OF PRESENT ILLNESS
[No Pertinent Cardiac History] : no history of aortic stenosis, atrial fibrillation, coronary artery disease, recent myocardial infarction, or implantable device/pacemaker [No Pertinent Pulmonary History] : no history of asthma, COPD, sleep apnea, or smoking [No Adverse Anesthesia Reaction] : no adverse anesthesia reaction in self or family member [Chronic Anticoagulation] : chronic anticoagulation [FreeTextEntry1] : LEFT CTS [FreeTextEntry2] : 2/23/24 [FreeTextEntry4] : PT WILL UNDERGO LEFT CTS SURGERY  PREOP LABS BY SURGICAL TEAM  ANNA RECOMM AS PER HEMATOLOGIST  HAD MRI OF BRAIN TODAY

## 2024-02-22 ENCOUNTER — TRANSCRIPTION ENCOUNTER (OUTPATIENT)
Age: 69
End: 2024-02-22

## 2024-02-23 ENCOUNTER — OUTPATIENT (OUTPATIENT)
Dept: OUTPATIENT SERVICES | Facility: HOSPITAL | Age: 69
LOS: 1 days | End: 2024-02-23
Payer: MEDICARE

## 2024-02-23 ENCOUNTER — APPOINTMENT (OUTPATIENT)
Dept: ORTHOPEDIC SURGERY | Facility: HOSPITAL | Age: 69
End: 2024-02-23

## 2024-02-23 ENCOUNTER — TRANSCRIPTION ENCOUNTER (OUTPATIENT)
Age: 69
End: 2024-02-23

## 2024-02-23 VITALS
HEIGHT: 63 IN | RESPIRATION RATE: 14 BRPM | DIASTOLIC BLOOD PRESSURE: 73 MMHG | HEART RATE: 53 BPM | OXYGEN SATURATION: 96 % | TEMPERATURE: 98 F | WEIGHT: 159.61 LBS | SYSTOLIC BLOOD PRESSURE: 151 MMHG

## 2024-02-23 VITALS
HEART RATE: 52 BPM | SYSTOLIC BLOOD PRESSURE: 124 MMHG | OXYGEN SATURATION: 100 % | DIASTOLIC BLOOD PRESSURE: 65 MMHG | RESPIRATION RATE: 18 BRPM

## 2024-02-23 DIAGNOSIS — Z98.890 OTHER SPECIFIED POSTPROCEDURAL STATES: Chronic | ICD-10-CM

## 2024-02-23 DIAGNOSIS — G56.02 CARPAL TUNNEL SYNDROME, LEFT UPPER LIMB: ICD-10-CM

## 2024-02-23 DIAGNOSIS — Z90.09 ACQUIRED ABSENCE OF OTHER PART OF HEAD AND NECK: Chronic | ICD-10-CM

## 2024-02-23 PROCEDURE — ZZZZZ: CPT

## 2024-02-23 PROCEDURE — 64721 CARPAL TUNNEL SURGERY: CPT | Mod: LT

## 2024-02-23 RX ORDER — ONDANSETRON 8 MG/1
4 TABLET, FILM COATED ORAL ONCE
Refills: 0 | Status: DISCONTINUED | OUTPATIENT
Start: 2024-02-23 | End: 2024-02-23

## 2024-02-23 RX ORDER — CHLORHEXIDINE GLUCONATE 213 G/1000ML
1 SOLUTION TOPICAL ONCE
Refills: 0 | Status: COMPLETED | OUTPATIENT
Start: 2024-02-23 | End: 2024-02-23

## 2024-02-23 RX ORDER — APIXABAN 2.5 MG/1
1 TABLET, FILM COATED ORAL
Refills: 0 | DISCHARGE

## 2024-02-23 RX ORDER — HYDROXYUREA 500 MG/1
1 CAPSULE ORAL
Refills: 0 | DISCHARGE

## 2024-02-23 RX ORDER — LEVOTHYROXINE SODIUM 125 MCG
1 TABLET ORAL
Refills: 0 | DISCHARGE

## 2024-02-23 RX ORDER — ACETAMINOPHEN 500 MG
2 TABLET ORAL
Qty: 0 | Refills: 0 | DISCHARGE

## 2024-02-23 RX ORDER — APREPITANT 80 MG/1
40 CAPSULE ORAL ONCE
Refills: 0 | Status: COMPLETED | OUTPATIENT
Start: 2024-02-23 | End: 2024-02-23

## 2024-02-23 RX ORDER — SODIUM CHLORIDE 9 MG/ML
1000 INJECTION, SOLUTION INTRAVENOUS
Refills: 0 | Status: DISCONTINUED | OUTPATIENT
Start: 2024-02-23 | End: 2024-02-23

## 2024-02-23 RX ORDER — CHOLECALCIFEROL (VITAMIN D3) 125 MCG
1 CAPSULE ORAL
Refills: 0 | DISCHARGE

## 2024-02-23 RX ORDER — CEFAZOLIN SODIUM 1 G
2000 VIAL (EA) INJECTION ONCE
Refills: 0 | Status: COMPLETED | OUTPATIENT
Start: 2024-02-23 | End: 2024-02-23

## 2024-02-23 RX ORDER — HYDROMORPHONE HYDROCHLORIDE 2 MG/ML
0.5 INJECTION INTRAMUSCULAR; INTRAVENOUS; SUBCUTANEOUS
Refills: 0 | Status: DISCONTINUED | OUTPATIENT
Start: 2024-02-23 | End: 2024-02-23

## 2024-02-23 RX ORDER — OXYCODONE AND ACETAMINOPHEN 5; 325 MG/1; MG/1
1 TABLET ORAL ONCE
Refills: 0 | Status: DISCONTINUED | OUTPATIENT
Start: 2024-02-23 | End: 2024-02-23

## 2024-02-23 RX ORDER — OXYCODONE HYDROCHLORIDE 5 MG/1
1 TABLET ORAL
Qty: 5 | Refills: 0
Start: 2024-02-23

## 2024-02-23 RX ORDER — HYDROMORPHONE HYDROCHLORIDE 2 MG/ML
0.2 INJECTION INTRAMUSCULAR; INTRAVENOUS; SUBCUTANEOUS
Refills: 0 | Status: DISCONTINUED | OUTPATIENT
Start: 2024-02-23 | End: 2024-02-23

## 2024-02-23 RX ADMIN — APREPITANT 40 MILLIGRAM(S): 80 CAPSULE ORAL at 08:29

## 2024-02-23 RX ADMIN — CHLORHEXIDINE GLUCONATE 1 APPLICATION(S): 213 SOLUTION TOPICAL at 08:30

## 2024-02-23 NOTE — ASU DISCHARGE PLAN (ADULT/PEDIATRIC) - COMMENTS
Please call the office to make or confirm your first post operative visit with Dr Viveros to change the dressing and remove the sutures.

## 2024-02-23 NOTE — ASU PATIENT PROFILE, ADULT - FALL HARM RISK - UNIVERSAL INTERVENTIONS
Bed in lowest position, wheels locked, appropriate side rails in place/Call bell, personal items and telephone in reach/Instruct patient to call for assistance before getting out of bed or chair/Non-slip footwear when patient is out of bed/[place] to call system/Physically safe environment - no spills, clutter or unnecessary equipment/Purposeful Proactive Rounding/Room/bathroom lighting operational, light cord in reach

## 2024-02-23 NOTE — ASU DISCHARGE PLAN (ADULT/PEDIATRIC) - ASU DC SPECIAL INSTRUCTIONSFT
Must exercise the left fingers by making a fist and then fully extending the fingers, please do 10 times oer hour, to reduce the swelling, pain and prevent stiffness of the left fingers.    For Constipation :   • Increase your water intake. Drink at least 8 glasses of water daily.  • Try adding fiber to your diet by eating fruits, vegetables and foods that are rich in grains.  • If you do experience constipation, you may take an over-the-counter stool softener/laxative such as Henrietta Colace, Senekot or  Milk of Magnesia. Must exercise the left fingers by making a fist and then fully extending the fingers, please do 10 times oer hour, to reduce the swelling, pain and prevent stiffness of the left fingers.  Use the sling for support    For Constipation :   • Increase your water intake. Drink at least 8 glasses of water daily.  • Try adding fiber to your diet by eating fruits, vegetables and foods that are rich in grains.  • If you do experience constipation, you may take an over-the-counter stool softener/laxative such as Henrietta Colace, Senekot or  Milk of Magnesia.

## 2024-02-23 NOTE — ASU DISCHARGE PLAN (ADULT/PEDIATRIC) - CARE PROVIDER_API CALL
Rubin Viveros  Orthopaedic Surgery  [address] [facility], [facility] [address]  [place] [address]  Phone: [phone]  Fax: [phone]  Established Patient  Follow Up Time: 2 weeks

## 2024-02-23 NOTE — ASU PREOP CHECKLIST - ORDERS/MEDICATION ADMINISTRATION RECORD ON CHART
done Attending to bill Attending to bill Attending to bill Attending to bill Attending to bill Attending to bill Attending to bill

## 2024-02-23 NOTE — ASU DISCHARGE PLAN (ADULT/PEDIATRIC) - NS MD DC FALL RISK RISK
For information on Fall & Injury Prevention, visit: [email or web address] OR  [email or web address] OR  https://www.cdc.gov/steadi/patient.html

## 2024-02-26 ENCOUNTER — APPOINTMENT (OUTPATIENT)
Dept: RADIATION ONCOLOGY | Facility: CLINIC | Age: 69
End: 2024-02-26
Payer: MEDICARE

## 2024-02-26 VITALS
DIASTOLIC BLOOD PRESSURE: 82 MMHG | BODY MASS INDEX: 28.69 KG/M2 | WEIGHT: 161.93 LBS | RESPIRATION RATE: 17 BRPM | TEMPERATURE: 97.5 F | OXYGEN SATURATION: 97 % | HEART RATE: 56 BPM | HEIGHT: 63 IN | SYSTOLIC BLOOD PRESSURE: 136 MMHG

## 2024-02-26 PROCEDURE — 99213 OFFICE O/P EST LOW 20 MIN: CPT

## 2024-02-26 NOTE — DISCHARGE NOTE PROVIDER - PROVIDER TOKENS
[FreeTextEntry1] : Plan:  #1 chronic symptoms of bloating, abdominal distention and lower abdominal pain associated with menstruation discussed.  Importance of follow-up with gynecology to assure that there is no primary gynecologic cause of symptoms was discussed.  In particular, advised to confer with gynecologist as to any possibility of endometriosis.  Patient has an appointment with gynecology tomorrow.  #2 possible GI etiology for complaints of bloating, abdominal distention and lower abdominal pain discussed.  Potential dietary triggers reviewed.  Lactose-free diet.  Avoid lactose, fructose and sorbitol products.  Avoid foods that could provoke complaints of bloating, abdominal distention and lower abdominal pain.  Written dietary guidelines were provided.  #3 Phazyme or other simethicone product as well as IBgard as needed.  #4 questionable utility of probiotics discussed.  However, patient wishes to proceed low risk and could pursue this if she elects to do so.  #5 screening colonoscopy will be scheduled. I had an extensive discussion with the patient including risks, benefits and alternatives possibly including bleeding, perforation, need for blood transfusion or surgery, infection, and medication and anesthetic risk. The limitations of colonoscopy were discussed including missed polyps and cancer.  Possible medication and anesthesia related adverse cardiovascular and respiratory reactions were reviewed.  The patient wishes to proceed and will be scheduled for screening colonoscopy.  The rationale of colonoscopy was discussed not only in terms of the early detection of colon cancer, but also as a means of prevention in the event of removal of a polyp.  The limitations of colonoscopy were discussed, and the patient is aware that not every cancer is prevented.  The patient will utilize the Suprep colonoscopy preparation in split fashion and the administration of this product was discussed.  PROVIDER:[TOKEN:[[id number]]],PROVIDER:[TOKEN:[[id number]]]

## 2024-02-27 ENCOUNTER — APPOINTMENT (OUTPATIENT)
Dept: DERMATOLOGY | Facility: CLINIC | Age: 69
End: 2024-02-27
Payer: MEDICARE

## 2024-02-27 DIAGNOSIS — R22.9 LOCALIZED SWELLING, MASS AND LUMP, UNSPECIFIED: ICD-10-CM

## 2024-02-27 PROCEDURE — 99203 OFFICE O/P NEW LOW 30 MIN: CPT

## 2024-03-04 NOTE — PHYSICAL EXAM
[] : no respiratory distress [Normal] : no focal deficits [General Appearance - Well Developed] : well developed [Oriented To Time, Place, And Person] : oriented to person, place, and time [de-identified] : some swelling to left nose

## 2024-03-04 NOTE — REVIEW OF SYSTEMS
[Negative] : Neurological [Fever] : no fever [Chills] : no chills [Night Sweats] : no night sweats [de-identified] : s/p LEFT hand CPS repair [de-identified] : denies headaches.

## 2024-03-04 NOTE — HISTORY OF PRESENT ILLNESS
[FreeTextEntry1] : Ms. Pitst presented for consideration of GKSRS for left cavernous sinus meningioma on 9/29/2020  She completed radiation therapy to her meningioma for a total of 2500 cgy 2/17-2/25/2021.  RELEVANT MEDICAL HISTORY She has a past medical history thyroid cancer s/p thyroidectomy in 2006 followed by radiation.  She began to have RIGHT sided headaches at the end of August 2019 followed by nausea/vomiting. She saw her PCP and was treated for viral gastroenteritis. However, she continued to have persistent headaches and had MRI/MRA done in September 2019 which demonstrated a LEFT cavernous meningioma.   She was seen by Dr. Tejeda on 9/26/19 and recommended evaluation for consideration of Gamma Knife Radiosurgery to meningioma. Additionally, the MRI did show possible narrowing of the LEFT intracavernous carotid narrowing. Due to this finding, Dr. Tejeda also recommended NOVA to evaluate for decreased blood flow due to possible LEFT carotid narrowing.Reviewed in tumor board on 9/30/19 and MRI demonstrated a large acute sagittal thrombosis. She was seen by Dr. Felder (neuro) and is now currently on lovenox bid. She is also following up with Dr. Martin (heme). Today she feels well. Notes headaches resolved after she started taking coumadin. Denies focal weakness, numbness, tingling, nausea, vomiting. Overall feeling well.  She still regularly feels some discomfort along the V2 distribution of the left side of her face.    4/29/2021- Ms. Michelle presents today for follow up. TOday she is doing well. No longer has discomfort to V2 on the left. sometimes has some tearing to the left eye. overall has been feeling very well since treatment  10/5/2021- Ms. Michelle presents today for follow up. She underwent a brain MRI on 10/2/2021. There is no final read on this scan however her meningioma appears smaller. No V2 pain. no headaches, no visual trouble, no balance trouble, no nausea or vomiting. notes some pain and numbness from her left shoulder to her left fingertips, starting this summer  10/5/2022- Ms. Michelle presents today for follow up. Brain MRI done 10/2/2022 showed Since 10/1/2021 the left cavernous sinus and jerica clinoid meningioma appears to be slightly smaller. she has had MRVs over the summer.  has not followed with Dr. Jacobsen recently. no headaches at all, no nausea, no focal weakness. notes intermittent vascular congestion to left side of nose and pressure to left V2.  Visit dated 2/26/2024 Presents for routine follow up with completed cranial images for review. Patient treated with GKRS for an incidentally found cavernous sinus meningioma. No longer with HAs d/t a superior sagittal sinus thrombosis for which she continues to be followed by Dr. Felder. Today she is doing well overall w/o any verbalized concerns.  MRI brain w w/o contrast 2/15/2024 with stable findings

## 2024-03-07 ENCOUNTER — APPOINTMENT (OUTPATIENT)
Dept: ORTHOPEDIC SURGERY | Facility: CLINIC | Age: 69
End: 2024-03-07
Payer: MEDICARE

## 2024-03-07 VITALS — HEIGHT: 63 IN | BODY MASS INDEX: 28.53 KG/M2 | WEIGHT: 161 LBS

## 2024-03-07 DIAGNOSIS — G56.02 CARPAL TUNNEL SYNDROME, LEFT UPPER LIMB: ICD-10-CM

## 2024-03-07 PROCEDURE — 99024 POSTOP FOLLOW-UP VISIT: CPT

## 2024-03-10 ENCOUNTER — NON-APPOINTMENT (OUTPATIENT)
Age: 69
End: 2024-03-10

## 2024-03-25 NOTE — ASSESSMENT
[FreeTextEntry1] : 1. Favor normal variant of nasal tissue/cartilage vs possible subcutaneous nodule  - Pt has previously seen ENT  - No concern for cutaneous malignancy based on clinical and dermoscopic examination  - Offered US of nose for further characterization, pt defers at this time to reconsider if she notices changes/growing  RTC PRN

## 2024-03-25 NOTE — PHYSICAL EXAM
[FreeTextEntry3] : - nodularity and thickening of the skin on the left nasal sidewall and nasal ala

## 2024-03-25 NOTE — HISTORY OF PRESENT ILLNESS
[FreeTextEntry1] : NPV: spot on nose [de-identified] : DANTE GRAHAM is a 68-year-old female new patient who presents for evaluation of the followin. Growth on the nose, present x 40 years, stable in size, asx   No personal hx of skin cancer Family Hx: Mom with hx skin CA, pt unsure of what type

## 2024-04-19 ENCOUNTER — APPOINTMENT (OUTPATIENT)
Dept: GASTROENTEROLOGY | Facility: CLINIC | Age: 69
End: 2024-04-19
Payer: MEDICARE

## 2024-04-19 VITALS
HEART RATE: 74 BPM | HEIGHT: 63 IN | WEIGHT: 161 LBS | DIASTOLIC BLOOD PRESSURE: 64 MMHG | OXYGEN SATURATION: 98 % | RESPIRATION RATE: 12 BRPM | SYSTOLIC BLOOD PRESSURE: 100 MMHG | BODY MASS INDEX: 28.53 KG/M2

## 2024-04-19 DIAGNOSIS — Z12.11 ENCOUNTER FOR SCREENING FOR MALIGNANT NEOPLASM OF COLON: ICD-10-CM

## 2024-04-19 DIAGNOSIS — Z80.0 ENCOUNTER FOR SCREENING FOR MALIGNANT NEOPLASM OF COLON: ICD-10-CM

## 2024-04-19 DIAGNOSIS — Z86.2 PERSONAL HISTORY OF DISEASES OF THE BLOOD AND BLOOD-FORMING ORGANS AND CERTAIN DISORDERS INVOLVING THE IMMUNE MECHANISM: ICD-10-CM

## 2024-04-19 DIAGNOSIS — G08 INTRACRANIAL AND INTRASPINAL PHLEBITIS AND THROMBOPHLEBITIS: ICD-10-CM

## 2024-04-19 DIAGNOSIS — E89.0 POSTPROCEDURAL HYPOTHYROIDISM: ICD-10-CM

## 2024-04-19 PROCEDURE — 99204 OFFICE O/P NEW MOD 45 MIN: CPT

## 2024-04-19 RX ORDER — SODIUM SULFATE, POTASSIUM SULFATE AND MAGNESIUM SULFATE 1.6; 3.13; 17.5 G/177ML; G/177ML; G/177ML
17.5-3.13-1.6 SOLUTION ORAL TWICE DAILY
Qty: 2 | Refills: 0 | Status: ACTIVE | COMMUNITY
Start: 2024-04-19 | End: 1900-01-01

## 2024-05-07 ENCOUNTER — APPOINTMENT (OUTPATIENT)
Dept: INTERNAL MEDICINE | Facility: CLINIC | Age: 69
End: 2024-05-07
Payer: MEDICARE

## 2024-05-07 VITALS
HEIGHT: 63 IN | TEMPERATURE: 98 F | WEIGHT: 157 LBS | SYSTOLIC BLOOD PRESSURE: 109 MMHG | BODY MASS INDEX: 27.82 KG/M2 | OXYGEN SATURATION: 96 % | RESPIRATION RATE: 12 BRPM | DIASTOLIC BLOOD PRESSURE: 73 MMHG | HEART RATE: 80 BPM

## 2024-05-07 DIAGNOSIS — C73 MALIGNANT NEOPLASM OF THYROID GLAND: ICD-10-CM

## 2024-05-07 DIAGNOSIS — D47.3 ESSENTIAL (HEMORRHAGIC) THROMBOCYTHEMIA: ICD-10-CM

## 2024-05-07 DIAGNOSIS — Z79.01 LONG TERM (CURRENT) USE OF ANTICOAGULANTS: ICD-10-CM

## 2024-05-07 DIAGNOSIS — D32.9 BENIGN NEOPLASM OF MENINGES, UNSPECIFIED: ICD-10-CM

## 2024-05-07 DIAGNOSIS — E03.9 HYPOTHYROIDISM, UNSPECIFIED: ICD-10-CM

## 2024-05-07 PROCEDURE — 99214 OFFICE O/P EST MOD 30 MIN: CPT

## 2024-05-07 RX ORDER — LEVOTHYROXINE SODIUM 0.17 MG/1
175 TABLET ORAL
Refills: 0 | Status: ACTIVE | COMMUNITY
Start: 2019-06-07

## 2024-05-07 NOTE — HISTORY OF PRESENT ILLNESS
[de-identified] : COMES FOR F/U  SEEN BY ENDO EVERY 6 M ON LEVOTHYROXINE 175 ,NL THYROID US  WILLL SEE HEM-ONC ON HYDREA 2 TABS DAILY S/P LEFT CTS SURGERY  WILL HAVE COLONOSCOPY 6/24 CC OF R LOWER BACK PAIN WHEN GOES TO BED

## 2024-05-07 NOTE — PHYSICAL EXAM
[No Acute Distress] : no acute distress [Normal Sclera/Conjunctiva] : normal sclera/conjunctiva [Normal Outer Ear/Nose] : the outer ears and nose were normal in appearance [No JVD] : no jugular venous distention [Normal] : normal rate, regular rhythm, normal S1 and S2 and no murmur heard [No Edema] : there was no peripheral edema [Soft] : abdomen soft [Non Tender] : non-tender [Normal Bowel Sounds] : normal bowel sounds [Normal Anterior Cervical Nodes] : no anterior cervical lymphadenopathy [None Except As Noted] : None except the [Full Except As Noted] : Full except as noted: [Normal Except As Noted] : Normal except as noted: [Negative Except As Noted] : Negative except [No Rash] : no rash [Coordination Grossly Intact] : coordination grossly intact [No Focal Deficits] : no focal deficits [Alert and Oriented x3] : oriented to person, place, and time

## 2024-05-07 NOTE — ASSESSMENT
[FreeTextEntry1] : 68 Y OLD FEM S/P LEFT CTS SURGERY = GREAT RECOVERY  ET = LABS AND F/U HEM THROID CA HX = F/U ENDO  DYSLIPIDEMIA = LABS  RTO 6 M FOR CPE

## 2024-05-08 LAB
ALBUMIN SERPL ELPH-MCNC: 4.7 G/DL
ALP BLD-CCNC: 70 U/L
ALT SERPL-CCNC: 27 U/L
ANION GAP SERPL CALC-SCNC: 12 MMOL/L
AST SERPL-CCNC: 26 U/L
BILIRUB SERPL-MCNC: 0.2 MG/DL
BUN SERPL-MCNC: 18 MG/DL
CALCIUM SERPL-MCNC: 9.4 MG/DL
CHLORIDE SERPL-SCNC: 104 MMOL/L
CHOLEST SERPL-MCNC: 209 MG/DL
CO2 SERPL-SCNC: 25 MMOL/L
CREAT SERPL-MCNC: 1.01 MG/DL
EGFR: 60 ML/MIN/1.73M2
GLUCOSE SERPL-MCNC: 104 MG/DL
HCT VFR BLD CALC: 40.3 %
HDLC SERPL-MCNC: 52 MG/DL
HGB BLD-MCNC: 12.6 G/DL
LDLC SERPL CALC-MCNC: 133 MG/DL
MCHC RBC-ENTMCNC: 31.3 GM/DL
MCHC RBC-ENTMCNC: 33.8 PG
MCV RBC AUTO: 108 FL
NONHDLC SERPL-MCNC: 157 MG/DL
PLATELET # BLD AUTO: 605 K/UL
POTASSIUM SERPL-SCNC: 4.7 MMOL/L
PROT SERPL-MCNC: 7.1 G/DL
RBC # BLD: 3.73 M/UL
RBC # FLD: 13.4 %
SODIUM SERPL-SCNC: 142 MMOL/L
TRIGL SERPL-MCNC: 136 MG/DL
WBC # FLD AUTO: 6.45 K/UL

## 2024-06-28 ENCOUNTER — APPOINTMENT (OUTPATIENT)
Dept: NEUROLOGY | Facility: CLINIC | Age: 69
End: 2024-06-28
Payer: MEDICARE

## 2024-06-28 VITALS
HEIGHT: 63 IN | SYSTOLIC BLOOD PRESSURE: 147 MMHG | OXYGEN SATURATION: 99 % | HEART RATE: 58 BPM | DIASTOLIC BLOOD PRESSURE: 80 MMHG | BODY MASS INDEX: 27.46 KG/M2 | TEMPERATURE: 97.6 F | WEIGHT: 155 LBS

## 2024-06-28 DIAGNOSIS — G08 INTRACRANIAL AND INTRASPINAL PHLEBITIS AND THROMBOPHLEBITIS: ICD-10-CM

## 2024-06-28 PROCEDURE — 99214 OFFICE O/P EST MOD 30 MIN: CPT

## 2024-08-23 ENCOUNTER — APPOINTMENT (OUTPATIENT)
Dept: INTERNAL MEDICINE | Facility: CLINIC | Age: 69
End: 2024-08-23
Payer: MEDICARE

## 2024-08-23 ENCOUNTER — NON-APPOINTMENT (OUTPATIENT)
Age: 69
End: 2024-08-23

## 2024-08-23 VITALS
DIASTOLIC BLOOD PRESSURE: 67 MMHG | SYSTOLIC BLOOD PRESSURE: 122 MMHG | WEIGHT: 154 LBS | RESPIRATION RATE: 12 BRPM | HEIGHT: 63 IN | OXYGEN SATURATION: 97 % | HEART RATE: 96 BPM | BODY MASS INDEX: 27.29 KG/M2

## 2024-08-23 DIAGNOSIS — Z01.818 ENCOUNTER FOR OTHER PREPROCEDURAL EXAMINATION: ICD-10-CM

## 2024-08-23 PROCEDURE — 99215 OFFICE O/P EST HI 40 MIN: CPT

## 2024-08-23 PROCEDURE — 93000 ELECTROCARDIOGRAM COMPLETE: CPT

## 2024-08-23 NOTE — ASSESSMENT
[Patient Optimized for Surgery] : Patient optimized for surgery [No Further Testing Recommended] : no further testing recommended [Continue anticoagulant treatment as is] : Continue current anticoagulant treatment [Continue anti-platelet treatment as is] : Continue current anti-platelet treatment [Continue medications as is] : Continue current medications [As per surgery] : as per surgery [FreeTextEntry4] : 68 Y OLD FEM WITH PMX OF ET,THYROID CA ON REMISSION AND MENINGIOMA ,CAVERNOUS SINUS THROMBOSIS HX  AT ACCEPTABLE RISK FOR SURGERY

## 2024-08-23 NOTE — HISTORY OF PRESENT ILLNESS
[No Pertinent Cardiac History] : no history of aortic stenosis, atrial fibrillation, coronary artery disease, recent myocardial infarction, or implantable device/pacemaker [Chronic Anticoagulation] : chronic anticoagulation [FreeTextEntry1] : R CATARACT SURGERY [FreeTextEntry2] : 9/5/24 [FreeTextEntry3] : DR SLOAN  [FreeTextEntry4] : PT COMES FOR PREOP  FEELS FINE ,NO CHANGE IN HEALTH

## 2024-08-25 ENCOUNTER — OUTPATIENT (OUTPATIENT)
Dept: OUTPATIENT SERVICES | Facility: HOSPITAL | Age: 69
LOS: 1 days | End: 2024-08-25
Payer: MEDICARE

## 2024-08-25 DIAGNOSIS — Z90.09 ACQUIRED ABSENCE OF OTHER PART OF HEAD AND NECK: Chronic | ICD-10-CM

## 2024-08-25 DIAGNOSIS — Z98.890 OTHER SPECIFIED POSTPROCEDURAL STATES: Chronic | ICD-10-CM

## 2024-08-25 PROCEDURE — 70544 MR ANGIOGRAPHY HEAD W/O DYE: CPT | Mod: 26,MH,76

## 2024-08-25 PROCEDURE — 70544 MR ANGIOGRAPHY HEAD W/O DYE: CPT

## 2024-11-14 ENCOUNTER — APPOINTMENT (OUTPATIENT)
Dept: INTERNAL MEDICINE | Facility: CLINIC | Age: 69
End: 2024-11-14
Payer: MEDICARE

## 2024-11-14 ENCOUNTER — NON-APPOINTMENT (OUTPATIENT)
Age: 69
End: 2024-11-14

## 2024-11-14 ENCOUNTER — LABORATORY RESULT (OUTPATIENT)
Age: 69
End: 2024-11-14

## 2024-11-14 VITALS
HEART RATE: 62 BPM | DIASTOLIC BLOOD PRESSURE: 68 MMHG | HEIGHT: 63 IN | BODY MASS INDEX: 27.82 KG/M2 | SYSTOLIC BLOOD PRESSURE: 101 MMHG | OXYGEN SATURATION: 96 % | WEIGHT: 157 LBS | RESPIRATION RATE: 12 BRPM | TEMPERATURE: 97.5 F

## 2024-11-14 DIAGNOSIS — Z00.00 ENCOUNTER FOR GENERAL ADULT MEDICAL EXAMINATION W/OUT ABNORMAL FINDINGS: ICD-10-CM

## 2024-11-14 LAB
ALBUMIN SERPL ELPH-MCNC: 4.3 G/DL
ALP BLD-CCNC: 74 U/L
ALT SERPL-CCNC: 27 U/L
ANION GAP SERPL CALC-SCNC: 12 MMOL/L
APPEARANCE: CLEAR
AST SERPL-CCNC: 25 U/L
BILIRUB SERPL-MCNC: 0.3 MG/DL
BILIRUBIN URINE: NEGATIVE
BLOOD URINE: NEGATIVE
BUN SERPL-MCNC: 21 MG/DL
CALCIUM SERPL-MCNC: 9.3 MG/DL
CHLORIDE SERPL-SCNC: 104 MMOL/L
CHOLEST SERPL-MCNC: 204 MG/DL
CO2 SERPL-SCNC: 25 MMOL/L
COLOR: YELLOW
CREAT SERPL-MCNC: 0.9 MG/DL
EGFR: 69 ML/MIN/1.73M2
GLUCOSE QUALITATIVE U: NEGATIVE MG/DL
GLUCOSE SERPL-MCNC: 81 MG/DL
HCT VFR BLD CALC: 40 %
HDLC SERPL-MCNC: 60 MG/DL
HGB BLD-MCNC: 12.5 G/DL
KETONES URINE: NEGATIVE MG/DL
LDLC SERPL CALC-MCNC: 130 MG/DL
LEUKOCYTE ESTERASE URINE: NEGATIVE
MCHC RBC-ENTMCNC: 31.3 G/DL
MCHC RBC-ENTMCNC: 33.4 PG
MCV RBC AUTO: 107 FL
NITRITE URINE: NEGATIVE
NONHDLC SERPL-MCNC: 145 MG/DL
PH URINE: 5.5
PLATELET # BLD AUTO: 547 K/UL
POTASSIUM SERPL-SCNC: 5 MMOL/L
PROT SERPL-MCNC: 6.7 G/DL
PROTEIN URINE: NEGATIVE MG/DL
RBC # BLD: 3.74 M/UL
RBC # FLD: 13.3 %
SODIUM SERPL-SCNC: 141 MMOL/L
SPECIFIC GRAVITY URINE: 1.02
TRIGL SERPL-MCNC: 79 MG/DL
TSH SERPL-ACNC: 0.1 UIU/ML
UROBILINOGEN URINE: 0.2 MG/DL
VIT B12 SERPL-MCNC: 676 PG/ML
WBC # FLD AUTO: 6.37 K/UL

## 2024-11-14 PROCEDURE — G0439: CPT

## 2024-11-15 ENCOUNTER — TRANSCRIPTION ENCOUNTER (OUTPATIENT)
Age: 69
End: 2024-11-15

## 2024-11-15 LAB — 25(OH)D3 SERPL-MCNC: 23 NG/ML

## 2025-01-21 ENCOUNTER — APPOINTMENT (OUTPATIENT)
Dept: GASTROENTEROLOGY | Facility: CLINIC | Age: 70
End: 2025-01-21
Payer: MEDICARE

## 2025-01-21 VITALS
HEART RATE: 70 BPM | RESPIRATION RATE: 12 BRPM | WEIGHT: 160 LBS | OXYGEN SATURATION: 98 % | SYSTOLIC BLOOD PRESSURE: 122 MMHG | BODY MASS INDEX: 28.35 KG/M2 | HEIGHT: 63 IN | DIASTOLIC BLOOD PRESSURE: 64 MMHG

## 2025-01-21 DIAGNOSIS — Z12.11 ENCOUNTER FOR SCREENING FOR MALIGNANT NEOPLASM OF COLON: ICD-10-CM

## 2025-01-21 DIAGNOSIS — Z80.0 ENCOUNTER FOR SCREENING FOR MALIGNANT NEOPLASM OF COLON: ICD-10-CM

## 2025-01-21 PROCEDURE — 99213 OFFICE O/P EST LOW 20 MIN: CPT

## 2025-02-19 ENCOUNTER — APPOINTMENT (OUTPATIENT)
Dept: GASTROENTEROLOGY | Facility: AMBULATORY SURGERY CENTER | Age: 70
End: 2025-02-19
Payer: MEDICARE

## 2025-02-19 PROCEDURE — 45378 DIAGNOSTIC COLONOSCOPY: CPT

## (undated) DEVICE — SLING ARM CHIEFTAIN MESH LARGE

## (undated) DEVICE — PREP SCRUB SKIN EXIDINE4% 30OZ

## (undated) DEVICE — SUT MONOSOF 5-0 18" P-13

## (undated) DEVICE — POSITIONER STRAP ARMBOARD VELCRO TS-30

## (undated) DEVICE — GLV 8 PROTEXIS (BLUE)

## (undated) DEVICE — GLV 7.5 PROTEXIS (WHITE)

## (undated) DEVICE — PREP CHLOROHEXIDINE 4% 118CC KIT

## (undated) DEVICE — GLV 7 PROTEXIS (WHITE)

## (undated) DEVICE — DRAPE 3/4 SHEET 52X76"

## (undated) DEVICE — POSITIONER FOAM HEAD CRADLE (PINK)

## (undated) DEVICE — TOURNIQUET CUFF 18" DUAL PORT DUAL BLADDER W PLC (BLACK)

## (undated) DEVICE — DRSG ACE BANDAGE 2"

## (undated) DEVICE — VENODYNE/SCD SLEEVE CALF MEDIUM

## (undated) DEVICE — DRAPE TOWEL BLUE 17" X 24"

## (undated) DEVICE — PACK UPPER EXTREMITY

## (undated) DEVICE — TOURNIQUET ESMARK 4"

## (undated) DEVICE — WARMING BLANKET LOWER ADULT